# Patient Record
Sex: FEMALE | Race: WHITE | NOT HISPANIC OR LATINO | Employment: UNEMPLOYED | URBAN - METROPOLITAN AREA
[De-identification: names, ages, dates, MRNs, and addresses within clinical notes are randomized per-mention and may not be internally consistent; named-entity substitution may affect disease eponyms.]

---

## 2017-01-01 ENCOUNTER — ALLIED HEALTH/NURSE VISIT (OUTPATIENT)
Dept: PHYSICAL MEDICINE AND REHAB | Facility: CLINIC | Age: 67
End: 2017-01-01
Payer: MEDICARE

## 2017-01-01 DIAGNOSIS — Z97.8 PRESENCE OF INTRATHECAL BACLOFEN PUMP: ICD-10-CM

## 2017-01-01 DIAGNOSIS — G35 MS (MULTIPLE SCLEROSIS) (H): Primary | ICD-10-CM

## 2017-01-01 DIAGNOSIS — M62.838 SPASM OF MUSCLE: ICD-10-CM

## 2017-01-02 ENCOUNTER — ALLIED HEALTH/NURSE VISIT (OUTPATIENT)
Dept: PHYSICAL MEDICINE AND REHAB | Facility: CLINIC | Age: 67
End: 2017-01-02

## 2017-01-02 DIAGNOSIS — M62.838 SPASM OF MUSCLE: Primary | ICD-10-CM

## 2017-01-02 DIAGNOSIS — Z95.828 PRESENCE OF IMPLANTED INFUSION PUMP: ICD-10-CM

## 2017-01-02 DIAGNOSIS — G35 MULTIPLE SCLEROSIS (H): ICD-10-CM

## 2017-01-02 NOTE — PROGRESS NOTES
Intrathecal Pump Clinic Note    Aga Fraga is being seen in the Intrathecal Baclofen Pump Clinic for a pump refill for Spasticity secondary to MS.      VERIFICATION OF PATIENT IDENTIFICATION AND PROCEDURE     Initials   Patient Name lmd   Patient  lmd   Procedure Verified by: lmd     Prior to the start of the procedure and with procedural staff participation, I verbally confirmed the patient s identity using two indicators, relevant allergies, that the procedure was appropriate and matched the consent or emergent situation, and that the correct equipment/implants were available. Immediately prior to starting the procedure I conducted the Time Out with the procedural staff and re-confirmed the patient s name, procedure, and site/side. (The Joint Commission universal protocol was followed.)  Yes      INTERIM HISTORY:  Aga has been doing well since our last visit.  Has had pneumonia treated with abx.  Continues to reside at [facility].  Is debating at this time to sell her condo.    INTERIM PAST MEDICAL HISTORY:  There have been other physician visits since our last appointment.  She has been seen by Urology for kidney stones.  Has had pneumonia treated with abx.   She IS NOT currently involved in therapy.  She is also working on ADL's for a home program.  She has made functional gains in .    REVIEW OF SYSTEMS:  She has pain managed with Baclofen Pump and oral meds  She has difficulty with bladder issues. Kidney stones  She denies having issues with her bowels.  Patient denies side effects from the intrathecal Baclofen.    Current Outpatient Prescriptions   Medication Sig Dispense Refill     cefTRIAXone (CEFTRIAXONE SODIUM) 1 GM vial Inject 1 g (1,000 mg) into the vein daily 20 mL 0     amoxicillin-clavulanate (AUGMENTIN) 875-125 MG per tablet Take 1 tablet by mouth 2 times daily 60 tablet 0     oxyCODONE (ROXICODONE) 5 MG IR tablet Take 1 tablet (5 mg) by mouth every 4 hours as  needed for pain 30 tablet 0     cholecalciferol (VITAMIN D3) 1000 UNIT tablet Take 1,000 Units by mouth daily       Glatiramer Acetate (COPAXONE) 40 MG/ML SOSY Inject 1 mL Subcutaneous three times a week Every Tuesday, Thursday and Saturday       fluticasone (FLONASE) 50 MCG/ACT spray Spray 1 spray into both nostrils daily as needed for rhinitis or allergies       nystatin-triamcinolone (MYCOLOG II) cream Apply topically 2 times daily as needed       Omega-3 Fatty Acids (OMEGA-3 PO) Take 300-1,000 mg by mouth daily       Pollen Extracts (PROSTAT PO) Take by mouth 2 times daily 15gm/1oz       collagenase ointment Apply topically as needed (Wound care for pressure ulcers)       acetaminophen (TYLENOL) 325 MG tablet Take 650 mg by mouth every 4 hours as needed for mild pain       OMEPRAZOLE PO Take 20 mg by mouth 2 times daily (before meals)        ferrous sulfate (IRON) 325 (65 FE) MG tablet Take 325 mg by mouth daily (with breakfast)       traMADol (ULTRAM) 50 MG tablet Take 1 tablet (50 mg) by mouth every 6 hours as needed 28 tablet      gabapentin (NEURONTIN) 100 MG capsule Take 1-2 capsules (100-200 mg) by mouth 3 times daily Take 100 mg (1 capsule) by mouth in the morning, 100 mg at noon, 100 mg in the afternoon, and 200 mg (2 capsules) at bedtimeSee Admin Instructions Take 100 mg (1 capsule) by mouth in the morning, 100 mg at noon, 100 mg in the afternoon, and 200 mg (2 capsules) at bedtime 90 capsule 0     PARoxetine (PAXIL-CR) 25 MG 24 hr tablet Take 1 tablet (25 mg) by mouth every morning 30 tablet      atenolol (TENORMIN) 25 MG tablet Take 1 tablet (25 mg) by mouth 2 times daily 30 tablet      calcium carbonate (OS-EVELINE 500 MG [place]. [place]) 500 MG tablet Take 1 tablet (500 mg) by mouth daily 90 tablet      multivitamin, therapeutic with minerals (THERA-VIT-M) TABS Take 1 tablet by mouth daily 30 each 0     baclofen (LIORESAL) 20 MG tablet Take 0.5 tablets (10 mg) by mouth 4 times daily as needed for muscle spasms  (Patient taking differently: Take 10 mg by mouth 2 times daily Taken Every AM and PM. Also given up to three times throughout the day PRN) 120 tablet 3     fosfomycin (MONUROL) 3 G packet Take 1 packet (3 g) by mouth every 7 days 1 packet 0     methyl salicylate-menthol (BENGAY) OINT Apply 2 g topically every 6 hours as needed (pain). 1 Tube 2       INTERIM SOCIAL HISTORY:  Social situation has not changed since the last visit.  She is unemployed.  She is living at [facility]..    PHYSICAL EXAMINATION:  Interrogation of the Baclofen  pump shows that it is currently running at a Simple Continuous dose of 27.0 mcg/day. She also has a Flex (complex continuous) dose of 35.0 mcg/hour over 5 hours; 35.4 mcg/hour over 5 hours.  Pump site is intact with no signs of infection, redness, swelling or seroma.    Clonus is not present.    Musculoskeletal contractures are present in the knees and ankles. She has bilateral knee flexion contractures with bilateral plantar flexion contractures, and her right ankle has an inversion flexion contracture.    Gait examination showed non-ambulatory.    Upper extremity motor control:  Unchanged from previous exam.    Lower extremity motor control/gait:  Unchanged from previous exam.    Oral motor control/speech:  Is optimized    Pain:  Is under adequate control.    Psychosocial:  Status is unchanged from the previous visit..    IMPRESSION/PLAN:  Visit Reason: Pump Refill  Refill Amount: 20 mL  NDC #: 2000 (26159-556-62)  Sterile Prep & Drape: Yes  Mililiters Withdrawn: 3.0  Mililiters Expected: 2.9  Previous Dose: 27.0 mcg/day continuous dose, with a flex dose of 35.0 mcg/hour over 5 hours, and 35.4 mcg/hour over 5 hours.  New Dose: 27.0 mcg/day, with flex dosing same as above  Alarm Date: 2/20/2017  Replacement Interval: 48 months  Spasticity: Optimal  Therapeutic Level: Optimal  Next appointment: 2/15/2017      Niru Yun RN  Under the supervision of  Gigi  MD Serjio  Department of Physical Medicine and Rehabilitation

## 2017-01-05 ENCOUNTER — TRANSFERRED RECORDS (OUTPATIENT)
Dept: HEALTH INFORMATION MANAGEMENT | Facility: CLINIC | Age: 67
End: 2017-01-05

## 2017-01-05 ENCOUNTER — CARE COORDINATION (OUTPATIENT)
Dept: UROLOGY | Facility: CLINIC | Age: 67
End: 2017-01-05

## 2017-01-05 DIAGNOSIS — N39.0 URINARY TRACT INFECTION ASSOCIATED WITH CATHETERIZATION OF URINARY TRACT, UNSPECIFIED INDWELLING URINARY CATHETER TYPE, INITIAL ENCOUNTER (H): Primary | ICD-10-CM

## 2017-01-05 DIAGNOSIS — T83.511A URINARY TRACT INFECTION ASSOCIATED WITH CATHETERIZATION OF URINARY TRACT, UNSPECIFIED INDWELLING URINARY CATHETER TYPE, INITIAL ENCOUNTER (H): Primary | ICD-10-CM

## 2017-01-05 NOTE — PROGRESS NOTES
Spoke with Parminder at [facility]. Aga continues to take Augmentin and will through the surgery next week. He will get a urine culture on her today per Dr. Hyde request. Nolvia Martin RN

## 2017-01-09 ENCOUNTER — CARE COORDINATION (OUTPATIENT)
Dept: UROLOGY | Facility: CLINIC | Age: 67
End: 2017-01-09

## 2017-01-09 ENCOUNTER — COMMUNICATION - HEALTHEAST (OUTPATIENT)
Dept: FAMILY MEDICINE | Facility: CLINIC | Age: 67
End: 2017-01-09

## 2017-01-09 ENCOUNTER — DOCUMENTATION ONLY (OUTPATIENT)
Dept: UROLOGY | Facility: CLINIC | Age: 67
End: 2017-01-09

## 2017-01-11 ENCOUNTER — ANESTHESIA EVENT (OUTPATIENT)
Dept: SURGERY | Facility: CLINIC | Age: 67
End: 2017-01-11
Payer: MEDICARE

## 2017-01-12 ENCOUNTER — ANESTHESIA (OUTPATIENT)
Dept: SURGERY | Facility: CLINIC | Age: 67
End: 2017-01-12
Payer: MEDICARE

## 2017-01-12 ENCOUNTER — APPOINTMENT (OUTPATIENT)
Dept: GENERAL RADIOLOGY | Facility: CLINIC | Age: 67
End: 2017-01-12
Attending: UROLOGY
Payer: MEDICARE

## 2017-01-12 PROCEDURE — 25000125 ZZHC RX 250: Performed by: ANESTHESIOLOGY

## 2017-01-12 PROCEDURE — 25000125 ZZHC RX 250: Performed by: NURSE ANESTHETIST, CERTIFIED REGISTERED

## 2017-01-12 PROCEDURE — 25800025 ZZH RX 258: Performed by: NURSE ANESTHETIST, CERTIFIED REGISTERED

## 2017-01-12 PROCEDURE — 25000125 ZZHC RX 250: Performed by: UROLOGY

## 2017-01-12 PROCEDURE — 25000128 H RX IP 250 OP 636: Performed by: ANESTHESIOLOGY

## 2017-01-12 PROCEDURE — 40000278 XR SURGERY CARM FLUORO LESS THAN 5 MIN: Mod: TC

## 2017-01-12 RX ORDER — LIDOCAINE HYDROCHLORIDE 20 MG/ML
INJECTION, SOLUTION INFILTRATION; PERINEURAL PRN
Status: DISCONTINUED | OUTPATIENT
Start: 2017-01-12 | End: 2017-01-12

## 2017-01-12 RX ORDER — ONDANSETRON 2 MG/ML
INJECTION INTRAMUSCULAR; INTRAVENOUS PRN
Status: DISCONTINUED | OUTPATIENT
Start: 2017-01-12 | End: 2017-01-12

## 2017-01-12 RX ORDER — SODIUM CHLORIDE, SODIUM LACTATE, POTASSIUM CHLORIDE, CALCIUM CHLORIDE 600; 310; 30; 20 MG/100ML; MG/100ML; MG/100ML; MG/100ML
INJECTION, SOLUTION INTRAVENOUS CONTINUOUS PRN
Status: DISCONTINUED | OUTPATIENT
Start: 2017-01-12 | End: 2017-01-12

## 2017-01-12 RX ORDER — ETOMIDATE 2 MG/ML
INJECTION INTRAVENOUS PRN
Status: DISCONTINUED | OUTPATIENT
Start: 2017-01-12 | End: 2017-01-12

## 2017-01-12 RX ORDER — NEOSTIGMINE METHYLSULFATE 1 MG/ML
VIAL (ML) INJECTION PRN
Status: DISCONTINUED | OUTPATIENT
Start: 2017-01-12 | End: 2017-01-12

## 2017-01-12 RX ORDER — FENTANYL CITRATE 50 UG/ML
INJECTION, SOLUTION INTRAMUSCULAR; INTRAVENOUS PRN
Status: DISCONTINUED | OUTPATIENT
Start: 2017-01-12 | End: 2017-01-12

## 2017-01-12 RX ORDER — GLYCOPYRROLATE 0.2 MG/ML
INJECTION, SOLUTION INTRAMUSCULAR; INTRAVENOUS PRN
Status: DISCONTINUED | OUTPATIENT
Start: 2017-01-12 | End: 2017-01-12

## 2017-01-12 RX ADMIN — FENTANYL CITRATE 100 MCG: 50 INJECTION, SOLUTION INTRAMUSCULAR; INTRAVENOUS at 12:58

## 2017-01-12 RX ADMIN — NEOSTIGMINE METHYLSULFATE 3 MG: 1 INJECTION INTRAMUSCULAR; INTRAVENOUS; SUBCUTANEOUS at 14:52

## 2017-01-12 RX ADMIN — PHENYLEPHRINE HYDROCHLORIDE 100 MCG: 10 INJECTION, SOLUTION INTRAMUSCULAR; INTRAVENOUS; SUBCUTANEOUS at 13:34

## 2017-01-12 RX ADMIN — GLYCOPYRROLATE 0.4 MG: 0.2 INJECTION, SOLUTION INTRAMUSCULAR; INTRAVENOUS at 14:52

## 2017-01-12 RX ADMIN — PHENYLEPHRINE HYDROCHLORIDE 200 MCG: 10 INJECTION, SOLUTION INTRAMUSCULAR; INTRAVENOUS; SUBCUTANEOUS at 13:05

## 2017-01-12 RX ADMIN — PHENYLEPHRINE HYDROCHLORIDE 0.3 MCG/KG/MIN: 10 INJECTION, SOLUTION INTRAMUSCULAR; INTRAVENOUS; SUBCUTANEOUS at 13:43

## 2017-01-12 RX ADMIN — SODIUM CHLORIDE, POTASSIUM CHLORIDE, SODIUM LACTATE AND CALCIUM CHLORIDE: 600; 310; 30; 20 INJECTION, SOLUTION INTRAVENOUS at 12:31

## 2017-01-12 RX ADMIN — VANCOMYCIN HYDROCHLORIDE 1 G: 1 INJECTION, SOLUTION INTRAVENOUS at 14:18

## 2017-01-12 RX ADMIN — LIDOCAINE HYDROCHLORIDE 60 MG: 20 INJECTION, SOLUTION INFILTRATION; PERINEURAL at 12:58

## 2017-01-12 RX ADMIN — ONDANSETRON 4 MG: 2 INJECTION INTRAMUSCULAR; INTRAVENOUS at 14:38

## 2017-01-12 RX ADMIN — PIPERACILLIN SODIUM AND TAZOBACTAM SODIUM 3.38 G: .375; 3 INJECTION, POWDER, LYOPHILIZED, FOR SOLUTION INTRAVENOUS at 13:45

## 2017-01-12 RX ADMIN — PHENYLEPHRINE HYDROCHLORIDE 100 MCG: 10 INJECTION, SOLUTION INTRAMUSCULAR; INTRAVENOUS; SUBCUTANEOUS at 13:15

## 2017-01-12 RX ADMIN — MIDAZOLAM HYDROCHLORIDE 1 MG: 1 INJECTION, SOLUTION INTRAMUSCULAR; INTRAVENOUS at 12:58

## 2017-01-12 RX ADMIN — ROCURONIUM BROMIDE 20 MG: 10 INJECTION INTRAVENOUS at 12:58

## 2017-01-12 RX ADMIN — ETOMIDATE 16 MG: 2 INJECTION INTRAVENOUS at 12:58

## 2017-01-12 ASSESSMENT — LIFESTYLE VARIABLES: TOBACCO_USE: 0

## 2017-01-12 NOTE — ANESTHESIA PREPROCEDURE EVALUATION
Anesthesia Evaluation     .        ROS/MED HX    ENT/Pulmonary: Comment: MS with respiratory compromise - wears 2L Oxygen at night. O2 saturations 80% upon arrival today. Bumped up to mid 90s with 2 L oxygen.      (-) tobacco use, asthma and recent URI   Neurologic:     (+)Multiple Sclerosis limitations: wheelchair bound, baclofen pump,     Cardiovascular: Comment: Hypertrophic cardiomyopathy    TTE 2013  Poor image quality  Technically difficult study   1.  Hypertrophic   cardiomyopathy with focal basal septal hypertrophy. Small LV cavity. No wall   motion abnormalities. Normal LV systolic function with estimated ejection   fraction 70%.  Severe dynamic LVOT obstruction at rest, peak gradient 58   mmHg. 2.  Grossly normal RV size and function  3. Systolic anterior motion of   mitral valve leaflets  4. Small IVC, suggestive of hypovolemia.  PatientHeight: 64 in  PatientWeight: 167 lbs  SystolicPressure: 127 mmHg  DiastolicPressure: 63 mmHg  HeartRate: 73 bpm  BSA 1.8 m^2      (+) ----. : . CHF . . :. .       METS/Exercise Tolerance:     Hematologic:     (+) Anemia, -      Musculoskeletal:         GI/Hepatic:     (+) GERD       Renal/Genitourinary:     (+) chronic renal disease (nephrolithiasis),       Endo:         Psychiatric:  - neg psychiatric ROS   (+) psychiatric history       Infectious Disease:         Malignancy:         Other:               Physical Exam  Normal systems: dental    Airway   Mallampati: III  TM distance: >3 FB  Neck ROM: full  Comment: Torticollis    Dental     Cardiovascular       Pulmonary                     Anesthesia Plan      History & Physical Review  History and physical reviewed and following examination; no interval change.    ASA Status:  3 .    NPO Status:  > 8 hours    Plan for General with Intravenous induction. Maintenance will be Inhalation.    PONV prophylaxis:  Ondansetron (or other 5HT-3) and Dexamethasone or Solumedrol  Additional equipment: Videolaryngoscope, 2nd IV and  Arterial Line Discussed risks, benefits, and alternatives of general anesthesia with the patient. Risks discussed included nausea/vomiting, sore throat, dental damage, soft tissue damage, problems with heart, brain, lungs, and rare allergic reactions. Patient was given a chance to ask questions. Patient consents to procedure.     Plan for arterial line, IV induction, inhalational maintenance.     We had a discussion that patient may not be extubated at the end of case. She understands that she may remain intubated and require transfer to the ICU at the end of the case. She voices understanding.       Postoperative Care  Postoperative pain management:  IV analgesics.      Consents  Anesthetic plan, risks, benefits and alternatives discussed with:  Patient.  Use of blood products discussed: Yes.   Use of blood products discussed with Patient.  Consented to blood products.  .                          .

## 2017-01-12 NOTE — ANESTHESIA POSTPROCEDURE EVALUATION
Patient: Aga Fraga    PERCUTANEOUS NEPHROLITHOTOMY (Right Flank)  Additional InformationProcedure(s):  Right Percutaneous Nephrolithotomy - Wound Class: I-Clean    Diagnosis:Right Straghorn Renal Calculus, Right Calculus Of Kidney   Diagnosis Additional Information: No value filed.    Anesthesia Type:  General    Note:  Anesthesia Post Evaluation    Patient location during evaluation: PACU  Patient participation: Able to fully participate in evaluation  Level of consciousness: awake and alert  Pain management: adequate  Airway patency: patent  Cardiovascular status: acceptable  Respiratory status: acceptable  Hydration status: acceptable  PONV: none     Anesthetic complications: None          Last vitals:  Filed Vitals:    01/12/17 1016 01/12/17 1515 01/12/17 1530   BP:  144/93 149/71   Temp:  36.6  C (97.9  F)    Resp:  12 12   SpO2: 93% 97% 97%       Electronically Signed By: Dao Kumar MD, MD  January 12, 2017  3:42 PM

## 2017-01-12 NOTE — ANESTHESIA CARE TRANSFER NOTE
Patient: Aga Fraga    PERCUTANEOUS NEPHROLITHOTOMY (Right Flank)  Additional InformationProcedure(s):  Right Percutaneous Nephrolithotomy - Wound Class: I-Clean    Diagnosis: Right Straghorn Renal Calculus, Right Calculus Of Kidney   Diagnosis Additional Information: No value filed.    Anesthesia Type:   General     Note:  Airway :Face Mask  Patient transferred to:PACU  Comments: VSS.  Spontaneous respirations.  Neuro at baseline.  No pain per patient report.  Satisfactory anesthetic recovery.      Vitals: (Last set prior to Anesthesia Care Transfer)              Electronically Signed By: KATALINA Zaman CRNA  January 12, 2017  3:28 PM

## 2017-01-12 NOTE — ANESTHESIA PROCEDURE NOTES
Arterial Line Procedure Note  Staff:     Anesthesiologist:  LUIS EDUARDO CHUNG    Resident/CRNA:  ERI NEGRON    Arterial line performed by resident/CRNA in presence of a teaching physician    Location: In OR After Induction  Procedure Start/Stop Times:     patient identified, IV checked, site marked, risks and benefits discussed, informed consent, monitors and equipment checked, pre-op evaluation and at physician/surgeon's request      Correct Patient: Yes      Correct Position: Yes      Correct Site: Yes      Correct Procedure: Yes      Correct Laterality:  Yes    Site Marked:  Yes  Line Placement:     Procedure:  Arterial Line    Insertion Site:  Radial    Insertion laterality:  Left    Skin Prep: Chloraprep      Patient Prep: patient draped, mask, sterile gloves, hat and hand hygiene      Local skin infiltration:  None    Ultrasound Guided?: Yes      Artery evaluated via ultrasound confirming patency.   Using realtime imaging, the artery was punctured and the needle was observed entering the artery.      Catheter size:  20 gauge, 12 cm    Cath secured with: suture      Dressing:  Tegaderm    Complications:  None obvious    Arterial waveform: Yes      IBP within 10% of NIBP: Yes

## 2017-01-13 ENCOUNTER — APPOINTMENT (OUTPATIENT)
Dept: CT IMAGING | Facility: CLINIC | Age: 67
End: 2017-01-13
Attending: UROLOGY
Payer: MEDICARE

## 2017-01-13 PROCEDURE — 74176 CT ABD & PELVIS W/O CONTRAST: CPT

## 2017-01-17 ENCOUNTER — ALLIED HEALTH/NURSE VISIT (OUTPATIENT)
Dept: UROLOGY | Facility: CLINIC | Age: 67
End: 2017-01-17

## 2017-01-17 ENCOUNTER — AMBULATORY - HEALTHEAST (OUTPATIENT)
Dept: GERIATRICS | Facility: CLINIC | Age: 67
End: 2017-01-17

## 2017-01-17 DIAGNOSIS — N20.0 KIDNEY STONES: Primary | ICD-10-CM

## 2017-01-17 DIAGNOSIS — N20.0 CALCULUS OF KIDNEY: Primary | ICD-10-CM

## 2017-01-17 NOTE — MR AVS SNAPSHOT
After Visit Summary   1/17/2017    Aga Fraga    MRN: [id number]           Patient Information     Date Of Birth          1950        Visit Information        Provider Department      1/17/2017 9:30 AM Nurse, [facility] U[facility] and [facility]        Today's Diagnoses     Kidney stones    -  1        Follow-ups after your visit        Your next 10 appointments already scheduled     Feb 15, 2017  2:00 PM   (Arrive by 1:45 PM)   Return Visit with  Pmr Nurse   [facility] Physical Medicine and Rehabilitation ([facility])    [address] [place] [address]  3rd [facility] [id number] [phone]            Feb 21, 2017  8:20 AM   CT ABDOMEN PELVIS W/O CONTRAST with UCCT2   [facility] [place] [facility] ([facility])    [address] [place] [address]  1st [facility] [id number] [phone]           Please bring any scans or X-rays taken at other hospitals, if similar tests were done. Also bring a list of your medicines, including vitamins, minerals and over-the-counter drugs. It is safest to leave personal items at home.  Be sure to tell your doctor:   If you have any allergies.   If there s any chance you are pregnant.   If you are breastfeeding.   If you have any special needs.  You do not need to do anything special to prepare.  Please wear loose clothing, such as a sweat suit or jogging clothes. Avoid snaps, zippers and other metal. We may ask you to undress and put on a hospital gown.            Feb 21, 2017 10:00 AM   (Arrive by 9:45 AM)   Return Visit with Darius Hyde MD   [facility] Urology and [facility]rolog[facility] ([facility])    [address] [place] [address]  4th [facility] [id number] [phone]              Future tests that were ordered for you today     Open Future Orders        Priority Expected Expires Ordered    CT Abdomen  pelvis w/o contrast Routine 2017            Who to contact     Please call your clinic at [phone] to:    Ask questions about your health    Make or cancel appointments    Discuss your medicines    Learn about your test results    Speak to your doctor   If you have compliments or concerns about an experience at your clinic, or if you wish to file a complaint, please contact [facility] Patient Relations at [phone] or email us at [email or web address]         Additional Information About Your Visit        Visualase Information     Visualase is an electronic gateway that provides easy, online access to your medical records. With Visualase, you can request a clinic appointment, read your test results, renew a prescription or communicate with your care team.     To sign up for Visualase visit the website at [email or web address]   You will be asked to enter the access code listed below, as well as some personal information. Please follow the directions to create your username and password.     Your access code is: [email or web address]  Expires: 2017  9:00 AM     Your access code will  in 90 days. If you need help or a new code, please contact your [facility] or call [phone] for assistance.        Care EveryWhere ID     This is your Care EveryWhere ID. This could be used by other organizations to access your [place] medical records  [id number]         Blood Pressure from Last 3 Encounters:   17 121/50   16 162/78   16 110/47    Weight from Last 3 Encounters:   17 63.277 kg (139 lb 8 oz)   16 71.6 kg (157 lb 13.6 oz)   16 71.668 kg (158 lb)              Today, you had the following     No orders found for display         Today's Medication Changes          These changes are accurate as of: 17  9:52 AM.  If you have any questions, ask your nurse or doctor.                These medicines have changed or have updated prescriptions.        Dose/Directions    baclofen 20 MG tablet   Commonly known as:  LIORESAL   This may have changed:    - when to take this  - additional instructions   Used for:  Multiple sclerosis (H), Muscle spasm        Dose:  10 mg   Take 0.5 tablets (10 mg) by mouth 4 times daily as needed for muscle spasms   Quantity:  120 tablet   Refills:  3                Primary Care Provider Office Phone # Fax #    Astrid Marroquin [phone]       [facility] AFTER HOURS [address] [place] [address]        Thank you!     Thank you for choosing [facility] UROLOGY AND [facility] FOR PROSTATE AND UROLOGIC CANCERS  for your care. Our goal is always to provide you with excellent care. Hearing back from our patients is one way we can continue to improve our services. Please take a few minutes to complete the written survey that you may receive in the mail after your visit with us. Thank you!             Your Updated Medication List - Protect others around you: Learn how to safely use, store and throw away your medicines at www.disposemymeds.org.          This list is accurate as of: 1/17/17  9:52 AM.  Always use your most recent med list.                   Brand Name Dispense Instructions for use    amoxicillin-clavulanate 875-125 MG per tablet    AUGMENTIN    60 tablet    Take 1 tablet by mouth 2 times daily       atenolol 25 MG tablet    TENORMIN    30 tablet    Take 1 tablet (25 mg) by mouth 2 times daily       baclofen 20 MG tablet    LIORESAL    120 tablet    Take 0.5 tablets (10 mg) by mouth 4 times daily as needed for muscle spasms       calcium carbonate 500 MG tablet    OS-EVELINE 500 mg [place]. [place]    90 tablet    Take 1 tablet (500 mg) by mouth daily       cholecalciferol 1000 UNIT tablet    vitamin D     Take 1,000 Units by mouth daily       collagenase ointment      Apply topically as needed (Wound care for pressure ulcers)       COPAXONE 40 MG/ML  Sosy   Generic drug:  Glatiramer Acetate      Inject 1 mL Subcutaneous three times a week Every Tuesday, Thursday and Saturday       ferrous sulfate 325 (65 FE) MG tablet    IRON     Take 325 mg by mouth daily (with breakfast)       fluticasone 50 MCG/ACT spray    FLONASE     Spray 1 spray into both nostrils daily as needed for rhinitis or allergies       gabapentin 100 MG capsule    NEURONTIN    90 capsule    Take 1-2 capsules (100-200 mg) by mouth 3 times daily Take 100 mg (1 capsule) by mouth in the morning, 100 mg at noon, 100 mg in the afternoon, and 200 mg (2 capsules) at bedtimeSee Admin Instructions Take 100 mg (1 capsule) by mouth in the morning, 100 mg at noon, 100 mg in the afternoon, and 200 mg (2 capsules) at bedtime       methyl salicylate-menthol Oint     1 Tube    Apply 2 g topically every 6 hours as needed (pain).       multivitamin, therapeutic with minerals Tabs tablet     30 each    Take 1 tablet by mouth daily       nystatin-triamcinolone cream    MYCOLOG II     Apply topically 2 times daily as needed       OMEGA-3 PO      Take 300-1,000 mg by mouth daily       OMEPRAZOLE PO      Take 20 mg by mouth 2 times daily (before meals)       oxyCODONE 5 MG IR tablet    ROXICODONE    30 tablet    Take 1 tablet (5 mg) by mouth every 4 hours as needed for moderate to severe pain       PARoxetine 25 MG 24 hr tablet    PAXIL-CR    30 tablet    Take 1 tablet (25 mg) by mouth every morning       PROSTAT PO      Take by mouth 2 times daily 15gm/1oz       traMADol 50 MG tablet    ULTRAM    28 tablet    Take 1 tablet (50 mg) by mouth every 6 hours as needed       TYLENOL 325 MG tablet   Generic drug:  acetaminophen      Take 650 mg by mouth every 4 hours as needed for mild pain

## 2017-01-17 NOTE — PROGRESS NOTES
Patient presented to clinic to have right PNT tube removed.  Dr. Hyde visited with the patient, removed the right PNT, placed an ostomy bag over insertion site of PNT for any additional drainage.  Directed patient that once the drainage has ceased that bag can be removed and band-aid can be placed over opening.  Patient was scheduled for CT stone protocol and follow up with Dr. Hyde in 4-6 weeks.    Cathie Ramos LPN

## 2017-01-23 ENCOUNTER — OFFICE VISIT - HEALTHEAST (OUTPATIENT)
Dept: GERIATRICS | Facility: CLINIC | Age: 67
End: 2017-01-23

## 2017-01-23 DIAGNOSIS — G35 MULTIPLE SCLEROSIS (H): ICD-10-CM

## 2017-01-23 DIAGNOSIS — N31.9 NEUROGENIC BLADDER: ICD-10-CM

## 2017-01-23 DIAGNOSIS — I10 ESSENTIAL HYPERTENSION: ICD-10-CM

## 2017-01-23 DIAGNOSIS — N20.0 RENAL CALCULI: ICD-10-CM

## 2017-01-23 DIAGNOSIS — I42.2 HYPERTROPHIC CARDIOMYOPATHY (H): ICD-10-CM

## 2017-01-31 ENCOUNTER — OFFICE VISIT - HEALTHEAST (OUTPATIENT)
Dept: VASCULAR SURGERY | Facility: CLINIC | Age: 67
End: 2017-01-31

## 2017-01-31 DIAGNOSIS — E88.09 HYPOALBUMINEMIA: ICD-10-CM

## 2017-01-31 DIAGNOSIS — G35 MULTIPLE SCLEROSIS (H): ICD-10-CM

## 2017-01-31 DIAGNOSIS — N31.9 NEUROGENIC BLADDER: ICD-10-CM

## 2017-01-31 DIAGNOSIS — L89.210: ICD-10-CM

## 2017-01-31 DIAGNOSIS — Z99.3 WHEELCHAIR BOUND: ICD-10-CM

## 2017-01-31 ASSESSMENT — MIFFLIN-ST. JEOR: SCORE: 1241.64

## 2017-02-04 ENCOUNTER — MEDICAL CORRESPONDENCE (OUTPATIENT)
Dept: HEALTH INFORMATION MANAGEMENT | Facility: CLINIC | Age: 67
End: 2017-02-04

## 2017-02-13 ENCOUNTER — PRE VISIT (OUTPATIENT)
Dept: UROLOGY | Facility: CLINIC | Age: 67
End: 2017-02-13

## 2017-02-13 NOTE — TELEPHONE ENCOUNTER
Patient coming in for 1 month check for kidney stones. Patient has Ct Scan prior to visit. Records/order in [employer]. No need to call patient

## 2017-02-15 ENCOUNTER — ALLIED HEALTH/NURSE VISIT (OUTPATIENT)
Dept: PHYSICAL MEDICINE AND REHAB | Facility: CLINIC | Age: 67
End: 2017-02-15

## 2017-02-15 DIAGNOSIS — G35 MULTIPLE SCLEROSIS (H): ICD-10-CM

## 2017-02-15 DIAGNOSIS — M62.838 SPASM OF MUSCLE: Primary | ICD-10-CM

## 2017-02-15 DIAGNOSIS — Z95.828 PRESENCE OF IMPLANTED INFUSION PUMP: ICD-10-CM

## 2017-02-15 NOTE — PROGRESS NOTES
Intrathecal Pump Clinic Note    Aga Fraga is being seen in the Intrathecal Baclofen Pump Clinic for a pump refill for Spasticity secondary to MS.      VERIFICATION OF PATIENT IDENTIFICATION AND PROCEDURE     Initials   Patient Name lmd   Patient  lmd   Procedure Verified by: bonnie     Prior to the start of the procedure and with procedural staff participation, I verbally confirmed the patient s identity using two indicators, relevant allergies, that the procedure was appropriate and matched the consent or emergent situation, and that the correct equipment/implants were available. Immediately prior to starting the procedure I conducted the Time Out with the procedural staff and re-confirmed the patient s name, procedure, and site/side. (The Joint Commission universal protocol was followed.)  Yes    INTERIM HISTORY:  Aga has not been doing well since our last visit.  She complains of chronic issues with renal stones.    INTERIM PAST MEDICAL HISTORY:  There have been other physician visits since our last appointment.  She has been seen by Urology for nephrostomy tube placement. Tube was subsequently removed.  She IS NOT currently involved in therapy.  She is also working on ADL's for a home program.  Currently resides at [facility].    She has made functional gains in .    REVIEW OF SYSTEMS:  She has pain managed with Baclofen Pump and oral meds.  She has difficulty with bladder issues. See above  She denies having issues with her bowels.  Patient denies side effects from the intrathecal Baclofen.    Current Outpatient Prescriptions   Medication Sig Dispense Refill     oxyCODONE (ROXICODONE) 5 MG IR tablet Take 1 tablet (5 mg) by mouth every 4 hours as needed for moderate to severe pain 30 tablet 0     cholecalciferol (VITAMIN D3) 1000 UNIT tablet Take 1,000 Units by mouth daily       Glatiramer Acetate (COPAXONE) 40 MG/ML SOSY Inject 1 mL Subcutaneous three times a week Every Tuesday,  Thursday and Saturday       fluticasone (FLONASE) 50 MCG/ACT spray Spray 1 spray into both nostrils daily as needed for rhinitis or allergies       nystatin-triamcinolone (MYCOLOG II) cream Apply topically 2 times daily as needed       Omega-3 Fatty Acids (OMEGA-3 PO) Take 300-1,000 mg by mouth daily       Pollen Extracts (PROSTAT PO) Take by mouth 2 times daily 15gm/1oz       collagenase ointment Apply topically as needed (Wound care for pressure ulcers)       acetaminophen (TYLENOL) 325 MG tablet Take 650 mg by mouth every 4 hours as needed for mild pain       OMEPRAZOLE PO Take 20 mg by mouth 2 times daily (before meals)        ferrous sulfate (IRON) 325 (65 FE) MG tablet Take 325 mg by mouth daily (with breakfast)       traMADol (ULTRAM) 50 MG tablet Take 1 tablet (50 mg) by mouth every 6 hours as needed 28 tablet      gabapentin (NEURONTIN) 100 MG capsule Take 1-2 capsules (100-200 mg) by mouth 3 times daily Take 100 mg (1 capsule) by mouth in the morning, 100 mg at noon, 100 mg in the afternoon, and 200 mg (2 capsules) at bedtimeSee Admin Instructions Take 100 mg (1 capsule) by mouth in the morning, 100 mg at noon, 100 mg in the afternoon, and 200 mg (2 capsules) at bedtime 90 capsule 0     PARoxetine (PAXIL-CR) 25 MG 24 hr tablet Take 1 tablet (25 mg) by mouth every morning 30 tablet      atenolol (TENORMIN) 25 MG tablet Take 1 tablet (25 mg) by mouth 2 times daily 30 tablet      calcium carbonate (OS-EVELINE 500 MG [place]. [place]) 500 MG tablet Take 1 tablet (500 mg) by mouth daily 90 tablet      multivitamin, therapeutic with minerals (THERA-VIT-M) TABS Take 1 tablet by mouth daily 30 each 0     baclofen (LIORESAL) 20 MG tablet Take 0.5 tablets (10 mg) by mouth 4 times daily as needed for muscle spasms (Patient taking differently: Take 10 mg by mouth 2 times daily Taken Every AM and PM. Also given up to three times throughout the day PRN) 120 tablet 3     methyl salicylate-menthol (BENGAY) OINT Apply 2 g topically  every 6 hours as needed (pain). 1 Tube 2       INTERIM SOCIAL HISTORY:  Social situation has not changed since the last visit.  She is unemployed.  She is living at [facility].    PHYSICAL EXAMINATION:  Interrogation of the Baclofen  pump shows that it is currently running at a Simple Continuous dose of 27.0 mcg/day. She also has a Flex (complex continuous) dose of 35.0 mcg/hour over 5 hours; 35.4 mcg/hour over 5 hours for total dose of 730.4 mcg/day.  Pump site is intact with no signs of infection, redness, swelling or seroma.     Clonus is not present.     Musculoskeletal contractures are present in the knees and ankles. She has bilateral knee flexion contractures with bilateral plantar flexion contractures, and her right ankle has an inversion flexion contracture.     Gait examination showed non-ambulatory.     Upper extremity motor control:  Unchanged from previous exam.     Lower extremity motor control/gait:  Unchanged from previous exam.     Oral motor control/speech:  Is optimized     Pain:  Is under adequate control.     Psychosocial:  Status is unchanged from the previous visit.    IMPRESSION/PLAN:  Visit Reason: Pump Refill  Refill Amount: 20 mL  NDC #: 2000 ([id number])  Sterile Prep & Drape: Yes  Mililiters Withdrawn: 3.8  Mililiters Expected: 3.9  New Dose: 27.0 mcg/day, with flex dosing same as above  Alarm Date: 4/5/2017  Replacement Interval: 47 months  Spasticity: Optimal  Therapeutic Level: Optimal    Next appointment: 3/29/2017      Niru Yun RN  Under the supervision of  Chirag Harris MD  Department of Physical Medicine and Rehabilitation

## 2017-02-15 NOTE — MR AVS SNAPSHOT
After Visit Summary   2/15/2017    Aga Fraga    MRN: [id number]           Patient Information     Date Of Birth          1950        Visit Information        Provider Department      2/15/2017 2:00 PM Nurse, Malcolm Ruby [facility] Physical Medicine and Rehabilitation        Today's Diagnoses     Spasm of muscle    -  1    Multiple sclerosis (H)        Presence of implanted infusion pump           Follow-ups after your visit        Your next 10 appointments already scheduled     Feb 21, 2017  8:20 AM CST   CT ABDOMEN PELVIS W/O CONTRAST with UCCT2   [facility] CT ([facility])    [address]  [address]st [facility] [id number] [phone]           Please bring any scans or X-rays taken at other hospitals, if similar tests were done. Also bring a list of your medicines, including vitamins, minerals and over-the-counter drugs. It is safest to leave personal items at home.  Be sure to tell your doctor:   If you have any allergies.   If there s any chance you are pregnant.   If you are breastfeeding.   If you have any special needs.  You do not need to do anything special to prepare.  Please wear loose clothing, such as a sweat suit or jogging clothes. Avoid snaps, zippers and other metal. We may ask you to undress and put on a hospital gown.            Feb 21, 2017 10:00 AM CST   (Arrive by 9:45 AM)   Return Visit with Darius Hyde MD   [facility]rology and Inst for Prostate and Urologic Cancers ([facility])    [address] [place] [address]  4th Floor  [facility] [id number] [phone]            Mar 29, 2017  1:00 PM CDT   (Arrive by 12:45 PM)   Return Visit with  Pmr Nurse   [facility] Physical Medicine and Rehabilitation ([facility])    [address]  3rd Floor  [facility] [id number] [phone]              Who to contact     Please call your clinic at [phone] to:    Ask  questions about your health    Make or cancel appointments    Discuss your medicines    Learn about your test results    Speak to your doctor   If you have compliments or concerns about an experience at your clinic, or if you wish to file a complaint, please contact [facility] Patient Relations at [phone] or email us at [email or web address]         Additional Information About Your Visit        ithinksporthart Information     [employer] is an electronic gateway that provides easy, online access to your medical records. With [employer], you can request a clinic appointment, read your test results, renew a prescription or communicate with your care team.     To sign up for [employer] visit the website at [email or web address]   You will be asked to enter the access code listed below, as well as some personal information. Please follow the directions to create your username and password.     Your access code is: [email or web address]  Expires: 2017  9:00 AM     Your access code will  in 90 days. If you need help or a new code, please contact your [facility] or call [phone] for assistance.        Care EveryWhere ID     This is your Care EveryWhere ID. This could be used by other organizations to access your [facility] medical records  [id number]         Blood Pressure from Last 3 Encounters:   17 121/50   16 162/78   16 110/47    Weight from Last 3 Encounters:   17 63.3 kg (139 lb 8 oz)   16 71.6 kg (157 lb 13.6 oz)   16 71.7 kg (158 lb)              Today, you had the following     No orders found for display         Today's Medication Changes          These changes are accurate as of: 2/15/17  4:43 PM.  If you have any questions, ask your nurse or doctor.               These medicines have changed or have updated prescriptions.        Dose/Directions    baclofen 20 MG tablet   Commonly known as:  LIORESAL   This  may have changed:    - when to take this  - additional instructions   Used for:  Multiple sclerosis (H), Muscle spasm        Dose:  10 mg   Take 0.5 tablets (10 mg) by mouth 4 times daily as needed for muscle spasms   Quantity:  120 tablet   Refills:  3                Primary Care Provider Office Phone # Fax #    Astrid Marroquin [phone]       [facility] AFTER HOURS [address] [place] [address]        Thank you!     Thank you for choosing [facility] PHYSICAL MEDICINE AND REHABILITATION  for your care. Our goal is always to provide you with excellent care. Hearing back from our patients is one way we can continue to improve our services. Please take a few minutes to complete the written survey that you may receive in the mail after your visit with us. Thank you!             Your Updated Medication List - Protect others around you: Learn how to safely use, store and throw away your medicines at www.disposemymeds.org.          This list is accurate as of: 2/15/17  4:43 PM.  Always use your most recent med list.                   Brand Name Dispense Instructions for use    atenolol 25 MG tablet    TENORMIN    30 tablet    Take 1 tablet (25 mg) by mouth 2 times daily       baclofen 20 MG tablet    LIORESAL    120 tablet    Take 0.5 tablets (10 mg) by mouth 4 times daily as needed for muscle spasms       calcium carbonate 500 MG tablet    OS-EVELINE 500 mg [place]. [place]    90 tablet    Take 1 tablet (500 mg) by mouth daily       cholecalciferol 1000 UNIT tablet    vitamin D     Take 1,000 Units by mouth daily       collagenase ointment      Apply topically as needed (Wound care for pressure ulcers)       COPAXONE 40 MG/ML Sosy   Generic drug:  Glatiramer Acetate      Inject 1 mL Subcutaneous three times a week Every Tuesday, Thursday and Saturday       ferrous sulfate 325 (65 FE) MG tablet    IRON     Take 325 mg by mouth daily (with breakfast)       fluticasone 50 MCG/ACT spray    FLONASE     Spray  1 spray into both nostrils daily as needed for rhinitis or allergies       gabapentin 100 MG capsule    NEURONTIN    90 capsule    Take 1-2 capsules (100-200 mg) by mouth 3 times daily Take 100 mg (1 capsule) by mouth in the morning, 100 mg at noon, 100 mg in the afternoon, and 200 mg (2 capsules) at bedtimeSee Admin Instructions Take 100 mg (1 capsule) by mouth in the morning, 100 mg at noon, 100 mg in the afternoon, and 200 mg (2 capsules) at bedtime       methyl salicylate-menthol Oint     1 Tube    Apply 2 g topically every 6 hours as needed (pain).       multivitamin, therapeutic with minerals Tabs tablet     30 each    Take 1 tablet by mouth daily       nystatin-triamcinolone cream    MYCOLOG II     Apply topically 2 times daily as needed       OMEGA-3 PO      Take 300-1,000 mg by mouth daily       OMEPRAZOLE PO      Take 20 mg by mouth 2 times daily (before meals)       oxyCODONE 5 MG IR tablet    ROXICODONE    30 tablet    Take 1 tablet (5 mg) by mouth every 4 hours as needed for moderate to severe pain       PARoxetine 25 MG 24 hr tablet    PAXIL-CR    30 tablet    Take 1 tablet (25 mg) by mouth every morning       PROSTAT PO      Take by mouth 2 times daily 15gm/1oz       traMADol 50 MG tablet    ULTRAM    28 tablet    Take 1 tablet (50 mg) by mouth every 6 hours as needed       TYLENOL 325 MG tablet   Generic drug:  acetaminophen      Take 650 mg by mouth every 4 hours as needed for mild pain

## 2017-02-16 ENCOUNTER — OFFICE VISIT - HEALTHEAST (OUTPATIENT)
Dept: GERIATRICS | Facility: CLINIC | Age: 67
End: 2017-02-16

## 2017-02-16 DIAGNOSIS — L89.323 DECUBITUS ULCER OF BUTTOCK, LEFT, STAGE III: ICD-10-CM

## 2017-02-16 DIAGNOSIS — Z87.440 HISTORY OF RECURRENT UTIS: ICD-10-CM

## 2017-02-16 DIAGNOSIS — G35 MULTIPLE SCLEROSIS (H): ICD-10-CM

## 2017-02-16 DIAGNOSIS — N20.0 RECURRENT NEPHROLITHIASIS: ICD-10-CM

## 2017-02-16 DIAGNOSIS — G89.4 CHRONIC PAIN SYNDROME: ICD-10-CM

## 2017-02-16 DIAGNOSIS — F32.A DEPRESSION, UNSPECIFIED DEPRESSION TYPE: ICD-10-CM

## 2017-02-16 DIAGNOSIS — N31.9 NEUROGENIC BLADDER: ICD-10-CM

## 2017-02-16 DIAGNOSIS — M62.838 MUSCLE SPASMS OF BOTH LOWER EXTREMITIES: ICD-10-CM

## 2017-02-19 ASSESSMENT — MIFFLIN-ST. JEOR: SCORE: 1257.06

## 2017-02-21 ENCOUNTER — OFFICE VISIT (OUTPATIENT)
Dept: UROLOGY | Facility: CLINIC | Age: 67
End: 2017-02-21

## 2017-02-21 VITALS
HEART RATE: 65 BPM | BODY MASS INDEX: 23.13 KG/M2 | WEIGHT: 139 LBS | DIASTOLIC BLOOD PRESSURE: 63 MMHG | SYSTOLIC BLOOD PRESSURE: 134 MMHG

## 2017-02-21 DIAGNOSIS — N20.0 CALCULUS OF KIDNEY: Primary | ICD-10-CM

## 2017-02-21 PROCEDURE — 87086 URINE CULTURE/COLONY COUNT: CPT | Performed by: UROLOGY

## 2017-02-21 ASSESSMENT — PAIN SCALES - GENERAL: PAINLEVEL: NO PAIN (0)

## 2017-02-21 NOTE — MR AVS SNAPSHOT
After Visit Summary   2/21/2017    Aga Fraga    MRN: [id number]           Patient Information     Date Of Birth          1950        Visit Information        Provider Department      2/21/2017 10:00 AM Darius Hyde MD [facility] for Prostate and Urologic Cancers        Today's Diagnoses     Calculus of kidney    -  1      Care Instructions    Follow up with Dr Ruelas or Genny to discuss Mitrofanoff leakage   Follow up with Dr Hyde in 6 months with a CT Scan on the same day    It was a pleasure meeting with you today.  Thank you for allowing me and my team the privilege of caring for you today.  YOU are the reason we are here, and I truly hope we provided you with the excellent service you deserve.  Please let us know if there is anything else we can do for you so that we can be sure you are leaving completely satisfied with your care experience.                Follow-ups after your visit        Your next 10 appointments already scheduled     Mar 13, 2017  2:30 PM CDT   (Arrive by 2:15 PM)   Return Visit with Phil Ruelas MD   [facility]state and Urologic Cancers ([facility])    [address] [place] [address]  4th [facility] [address]   [phone]            Mar 29, 2017  1:00 PM CDT   (Arrive by 12:45 PM)   Return Visit with  Pmr Nurse   [facility] Physical Medicine and Rehabilitation ([facility])    [address]  3rd [facility] [address]   [phone]            Apr 03, 2017  2:30 PM CDT   (Arrive by 2:15 PM)   Return Visit with Greg Royal DO   [facility]state and Urologic Cancers ([facility])    [address]  [address]th [facility] [address]   [phone]            Aug 22, 2017  2:00 PM CDT   (Arrive by 1:45 PM)   Return Visit with Darius Hyde MD   [facility] for  Prostate and Urologic Cancers ([facility])    [address]  4th [facility] [address] [phone]              Future tests that were ordered for you today     Open Future Orders        Priority Expected Expires Ordered    CT Abdomen pelvis w/o contrast Routine 2017    Urine Culture Aerobic Bacterial Routine  2018            Who to contact     Please call your clinic at [phone] to:    Ask questions about your health    Make or cancel appointments    Discuss your medicines    Learn about your test results    Speak to your doctor   If you have compliments or concerns about an experience at your clinic, or if you wish to file a complaint, please contact [facility] Patient Relations at [phone] or email us at [email or web address]         Additional Information About Your Visit        [employer] Information     [employer] is an electronic gateway that provides easy, online access to your medical records. With [employer], you can request a clinic appointment, read your test results, renew a prescription or communicate with your care team.     To sign up for [employer] visit the website at [email or web address]   You will be asked to enter the access code listed below, as well as some personal information. Please follow the directions to create your username and password.     Your access code is: [email or web address]  Expires: 2017  9:00 AM     Your access code will  in 90 days. If you need help or a new code, please contact your [facility] or call [phone] for assistance.        Care EveryWhere ID     This is your Care EveryWhere ID. This could be used by other organizations to access your [facility] medical records  [id number]        Your Vitals Were     Pulse BMI (Body Mass Index)                65 23.13 kg/m2           Blood Pressure from Last 3  Encounters:   02/21/17 134/63   01/13/17 121/50   12/19/16 162/78    Weight from Last 3 Encounters:   02/21/17 63 kg (139 lb)   01/12/17 63.3 kg (139 lb 8 oz)   12/19/16 71.6 kg (157 lb 13.6 oz)                 Today's Medication Changes          These changes are accurate as of: 2/21/17 12:02 PM.  If you have any questions, ask your nurse or doctor.               These medicines have changed or have updated prescriptions.        Dose/Directions    baclofen 20 MG tablet   Commonly known as:  LIORESAL   This may have changed:    - when to take this  - additional instructions   Used for:  Multiple sclerosis (H), Muscle spasm        Dose:  10 mg   Take 0.5 tablets (10 mg) by mouth 4 times daily as needed for muscle spasms   Quantity:  120 tablet   Refills:  3                Primary Care Provider Office Phone # Fax #    Astrid RUTH Navingrant [phone]       [facility] AFTER HOURS [address] [place] [address]        Thank you!     Thank you for choosing [facility] UROLOGY AND [facility] FOR PROSTATE AND UROLOGIC CANCERS  for your care. Our goal is always to provide you with excellent care. Hearing back from our patients is one way we can continue to improve our services. Please take a few minutes to complete the written survey that you may receive in the mail after your visit with us. Thank you!             Your Updated Medication List - Protect others around you: Learn how to safely use, store and throw away your medicines at www.disposemymeds.org.          This list is accurate as of: 2/21/17 12:02 PM.  Always use your most recent med list.                   Brand Name Dispense Instructions for use    atenolol 25 MG tablet    TENORMIN    30 tablet    Take 1 tablet (25 mg) by mouth 2 times daily       baclofen 20 MG tablet    LIORESAL    120 tablet    Take 0.5 tablets (10 mg) by mouth 4 times daily as needed for muscle spasms       calcium carbonate 500 MG tablet    OS-EVELINE 500 mg [place]. [place]    90 tablet     Take 1 tablet (500 mg) by mouth daily       cholecalciferol 1000 UNIT tablet    vitamin D     Take 1,000 Units by mouth daily       collagenase ointment      Apply topically as needed (Wound care for pressure ulcers)       COPAXONE 40 MG/ML Sosy   Generic drug:  Glatiramer Acetate      Inject 1 mL Subcutaneous three times a week Every Tuesday, Thursday and Saturday       ferrous sulfate 325 (65 FE) MG tablet    IRON     Take 325 mg by mouth daily (with breakfast)       fluticasone 50 MCG/ACT spray    FLONASE     Spray 1 spray into both nostrils daily as needed for rhinitis or allergies       gabapentin 100 MG capsule    NEURONTIN    90 capsule    Take 1-2 capsules (100-200 mg) by mouth 3 times daily Take 100 mg (1 capsule) by mouth in the morning, 100 mg at noon, 100 mg in the afternoon, and 200 mg (2 capsules) at bedtimeSee Admin Instructions Take 100 mg (1 capsule) by mouth in the morning, 100 mg at noon, 100 mg in the afternoon, and 200 mg (2 capsules) at bedtime       methyl salicylate-menthol Oint     1 Tube    Apply 2 g topically every 6 hours as needed (pain).       multivitamin, therapeutic with minerals Tabs tablet     30 each    Take 1 tablet by mouth daily       nystatin-triamcinolone cream    MYCOLOG II     Apply topically 2 times daily as needed       OMEGA-3 PO      Take 300-1,000 mg by mouth daily       OMEPRAZOLE PO      Take 20 mg by mouth 2 times daily (before meals)       oxyCODONE 5 MG IR tablet    ROXICODONE    30 tablet    Take 1 tablet (5 mg) by mouth every 4 hours as needed for moderate to severe pain       PARoxetine 25 MG 24 hr tablet    PAXIL-CR    30 tablet    Take 1 tablet (25 mg) by mouth every morning       PROSTAT PO      Take by mouth 2 times daily 15gm/1oz       traMADol 50 MG tablet    ULTRAM    28 tablet    Take 1 tablet (50 mg) by mouth every 6 hours as needed       TYLENOL 325 MG tablet   Generic drug:  acetaminophen      Take 650 mg by mouth every 4 hours as needed for mild  pain

## 2017-02-21 NOTE — LETTER
2/21/2017       RE: Aga Fraga  [address] [place] [address]     Dear Colleague,    Thank you for referring your patient, Aga Fraga, to the [facility] UROLOGY AND INST FOR PROSTATE AND UROLOGIC CANCERS at [facility]. Please see a copy of my visit note below.           UROLOGY FOLLOW-UP NOTE          Chief Complaint:   Today I had the pleasure of seeing Ms. Aga Fraga in follow-up for a chief complaint of bilateral renal calculi         Interval Update    Aga Fraga is a very pleasant 66 year old female last seen 2 months ago.  At our last visit she was doing well after second PCNL for large renal, struvite staghorn calculous.  Since last being seen she notes a possible UTI, though is unclear if this was symptomatic infection or just a positive urine culture.  She has had no flank pain or hematuria but does note persistent leaking from her mitrofanoff.  This has been a problem for approx 1 year and a prior attempt was made to do a bulking injection which has not provided the desired result in terms of leakage.         Physical Exam:   Patient is a 66 year old  female   Vitals: Blood pressure 134/63, pulse 65, weight 63 kg (139 lb), not currently breastfeeding.  General Appearance Adult: Alert, no acute distress, oriented  HENT: throat/mouth:normal, good dentition, neck contracted to left side  Neck: No adenopathy,masses or thyromegaly  Lungs: no respiratory distress, or pursed lip breathing  Heart: No obvious jugular venous distension present  Abdomen: soft, nontender, no organomegaly or masses, Body mass index is 23.13 kg/(m^2).  Lymphatics: No cervical or supraclavicular adenopathy  Musculoskeltal: full upper extremity motion, otherwise sitting in wheelchair  Skin: no suspicious lesions or rashes  Neuro: Alert, oriented, speech and mentation normal  Psych: affect and mood normal      Labs and Pathology:    I personally reviewed all  applicable laboratory data and went over findings with patient  Significant for:    CBC RESULTS:  Recent Labs   Lab Test  01/13/17   0602  01/12/17   1612  01/12/17   1052  12/19/16   1550   WBC  5.1  7.0  5.8  4.9   HGB  9.4*  10.9*  11.8  10.7*   PLT  150  168  193  130*        BMP RESULTS:  Recent Labs   Lab Test  01/13/17   0602  01/12/17   1612  01/12/17   1052  12/19/16   1550   NA  144  142  143  144   POTASSIUM  4.1  4.3  4.1  4.3   CHLORIDE  107  105  105  106   CO2  34*  32  33*  33*   ANIONGAP  3  5  5  5   GLC  104*  139*  107*  91   BUN  13 16 17  9   CR  0.50*  0.43*  0.38*  0.48*   GFRESTIMATED  >90  Non  GFR Calc    >90  Non  GFR Calc    >90  Non  GFR Calc    >90  Non  GFR Calc     GFRESTBLACK  >90   GFR Calc    >90   GFR Calc    >90   GFR Calc    >90   GFR Calc     EVELINE  8.9  9.3  10.0  9.6       UA RESULTS:   Recent Labs   Lab Test  07/12/16   0922  06/10/16   0023  02/04/15   1420   SG  1.010  1.021  1.010   URINEPH  7.0  8.0*  5.5   NITRITE  Positive*  Positive*  Negative   RBCU  >182*  43*  12*   WBCU  >182*  >182*  28*         Imaging:    I personally reviewed all applicable imaging and went over findings with patient.  Significant for CT 2/21/17  Impression:   1. 1 mm stone in the upper pole the right kidney, and 2 mm stone in  the lumen of the urinary bladder. No obstruction of the urinary  collecting system.  2. Other chronic and incidental findings as above.         Past Medical History:     Past Medical History   Diagnosis Date     Anxiety      Cardiomyopathy (H)      Chronic pain      Decubitus ulcer      buttocks, stage II     Frequent UTI      GI bleed      massive duodenal bulb ulcer requiring GDA embolization      Hypertrophic cardiomyopathy (H)      MRSA (methicillin resistant Staphylococcus aureus)      MS (multiple sclerosis) (H)      Multiple sclerosis  (H)      Nephrolithiasis      Neurogenic bladder      Rectal prolapse             Past Surgical History:     Past Surgical History   Procedure Laterality Date     Cholecystectomy       Adrenalectomy       Mitrofanoff procedure (appendix conduit)       Fern     Revision mitrofanoff child  9/11/2000     removal of Mitrofanoff (Fern)     Bladder augmentation  9/11/2000     CREATION OF NEW RYAN STOMA     Laser holmium nephrolithotomy via percutaneous nephrostomy  9/11/2013     Procedure: LASER HOLMIUM NEPHROLITHOTOMY VIA PERCUTANEOUS NEPHROSTOMY;  Left Percutaneous Access, Left Percutaneous Nephrolithotomy, Nephroscopy, Placement of 12 Fr Coude to Mitrofanoff;  Surgeon: Dao Ureña MD;  [facility]     Tubal ligation       Dilation and curettage       x3     C fix rectal prolapse,perineal appr       Laser holmium nephrolithotomy via percutaneous nephrostomy  11/6/2013     Procedure: LASER HOLMIUM NEPHROLITHOTOMY VIA PERCUTANEOUS NEPHROSTOMY;  Second Look Left Percutaneous Nephrolithotomy,  ;  Surgeon: Dao Ureña MD;  [facility]     Revise pump baclofen  4/28/2014     Procedure: REVISE PUMP BACLOFEN;  Surgeon: Milton Gutierrez MD;  [facility]     Esophagoscopy, gastroscopy, duodenoscopy (egd), combined N/A 2/23/2015     Procedure: COMBINED ESOPHAGOSCOPY, GASTROSCOPY, DUODENOSCOPY (EGD);  Surgeon: Denzel Ferrara Chi, MD;  [facility]     Picc insertion Right 6/14/2016     4fr SL BioFlo PICC, 36cm (1cm external) in the R medial brachial vein     Cystoscopy, inject vesicoureteral reflux gel N/A 10/26/2016     Procedure: CYSTOSCOPY, INJECT VESICOURETERAL REFLUX GEL;  Surgeon: Phil Ruelas MD;  [facility]     Laser holmium nephrolithotomy via percutaneous nephrostomy Left 12/15/2016     Procedure: LASER HOLMIUM NEPHROLITHOTOMY VIA PERCUTANEOUS NEPHROSTOMY;  Surgeon: Darius Hyde MD;  [facility]     Percutaneous nephrolithotomy Left 12/19/2016      Procedure: PERCUTANEOUS NEPHROLITHOTOMY;  Surgeon: Darius Hyde MD;  [facility]     Percutaneous nephrolithotomy Right 2017     Procedure: PERCUTANEOUS NEPHROLITHOTOMY;  Surgeon: Darius Hyde MD;  [facility]            Medications     Current Outpatient Prescriptions   Medication     cholecalciferol (VITAMIN D3) 1000 UNIT tablet     Glatiramer Acetate (COPAXONE) 40 MG/ML SOSY     fluticasone (FLONASE) 50 MCG/ACT spray     nystatin-triamcinolone (MYCOLOG II) cream     Omega-3 Fatty Acids (OMEGA-3 PO)     Pollen Extracts (PROSTAT PO)     collagenase ointment     acetaminophen (TYLENOL) 325 MG tablet     OMEPRAZOLE PO     ferrous sulfate (IRON) 325 (65 FE) MG tablet     traMADol (ULTRAM) 50 MG tablet     gabapentin (NEURONTIN) 100 MG capsule     PARoxetine (PAXIL-CR) 25 MG 24 hr tablet     atenolol (TENORMIN) 25 MG tablet     calcium carbonate (OS-EVELINE 500 MG [place]. [place]) 500 MG tablet     multivitamin, therapeutic with minerals (THERA-VIT-M) TABS     baclofen (LIORESAL) 20 MG tablet     methyl salicylate-menthol (BENGAY) OINT     oxyCODONE (ROXICODONE) 5 MG IR tablet     No current facility-administered medications for this visit.             Family History:     Family History   Problem Relation Age of Onset     DIABETES Mother      CANCER Mother      small cell lung CA;      HEART DISEASE Sister      HOCM;  at age 42            Social History:     Social History     Social History     Marital status:      Spouse name: N/A     Number of children: N/A     Years of education: N/A     Occupational History     Not on file.     Social History Main Topics     Smoking status: Former Smoker     Packs/day: 1.00     Years: 20.00     Types: Cigarettes     Quit date: 1980     Smokeless tobacco: Never Used     Alcohol use No     Drug use: No     Sexual activity: No     Other Topics Concern     Not on file     Social History Narrative    Mom  at age 50's from breast cancer.  "   Dad  at age 95 from old age.        In a relationship at her living facility- \"companionship\".    Brother 70 years old    Sister 61 years old     Second sister  from cardiomyopathy    2 daughters alive and well                Allergies:   Blood transfusion related (informational only); Aspartame; Blood-group specific substance; Cephalexin; Diuretic; Fructose; Furosemide; Spironolactone; Sulfamethoxazole w/trimethoprim; and Lanolin oil         Review of Systems:  From intake questionnaire   Negative 14 system review except as noted on HPI, nurse's note.           Assessment/Plan     Ms. Fraga is a 67 yo F with recurrent struvite staghorn calculous  -Repeat urine culture today to ensure no urea splitting bacteria  -Will refer to Dr. Tapia for persistent leakage from mitrofanoff  -Encouraged high fluid intake to prevent stones, low sodium, moderate calcium, modest animal protein  -Should get KUB q6m to assess for stone growth    Darius Hyde MD    CC:  Astrid Marroquin    "

## 2017-02-21 NOTE — PATIENT INSTRUCTIONS
Follow up with Dr Ruelas or Genny to discuss Mitrofanoff leakage   Follow up with Dr Hyde in 6 months with a CT Scan on the same day    It was a pleasure meeting with you today.  Thank you for allowing me and my team the privilege of caring for you today.  YOU are the reason we are here, and I truly hope we provided you with the excellent service you deserve.  Please let us know if there is anything else we can do for you so that we can be sure you are leaving completely satisfied with your care experience.

## 2017-02-22 LAB
BACTERIA SPEC CULT: NORMAL
Lab: NORMAL
MICRO REPORT STATUS: NORMAL
SPECIMEN SOURCE: NORMAL

## 2017-02-25 NOTE — PROGRESS NOTES
UROLOGY FOLLOW-UP NOTE          Chief Complaint:   Today I had the pleasure of seeing Ms. Aga Fraga in follow-up for a chief complaint of bilateral renal calculi         Interval Update    Aga Fraga is a very pleasant 66 year old female last seen 2 months ago.  At our last visit she was doing well after second PCNL for large renal, struvite staghorn calculous.  Since last being seen she notes a possible UTI, though is unclear if this was symptomatic infection or just a positive urine culture.  She has had no flank pain or hematuria but does note persistent leaking from her mitrofanoff.  This has been a problem for approx 1 year and a prior attempt was made to do a bulking injection which has not provided the desired result in terms of leakage.         Physical Exam:   Patient is a 66 year old  female   Vitals: Blood pressure 134/63, pulse 65, weight 63 kg (139 lb), not currently breastfeeding.  General Appearance Adult: Alert, no acute distress, oriented  HENT: throat/mouth:normal, good dentition, neck contracted to left side  Neck: No adenopathy,masses or thyromegaly  Lungs: no respiratory distress, or pursed lip breathing  Heart: No obvious jugular venous distension present  Abdomen: soft, nontender, no organomegaly or masses, Body mass index is 23.13 kg/(m^2).  Lymphatics: No cervical or supraclavicular adenopathy  Musculoskeltal: full upper extremity motion, otherwise sitting in wheelchair  Skin: no suspicious lesions or rashes  Neuro: Alert, oriented, speech and mentation normal  Psych: affect and mood normal      Labs and Pathology:    I personally reviewed all applicable laboratory data and went over findings with patient  Significant for:    CBC RESULTS:  Recent Labs   Lab Test  01/13/17   0602  01/12/17   1612  01/12/17   1052  12/19/16   1550   WBC  5.1  7.0  5.8  4.9   HGB  9.4*  10.9*  11.8  10.7*   PLT  150  168  193  130*        BMP RESULTS:  Recent Labs   Lab Test  01/13/17    0602  01/12/17   1612  01/12/17   1052  12/19/16   1550   NA  144  142  143  144   POTASSIUM  4.1  4.3  4.1  4.3   CHLORIDE  107  105  105  106   CO2  34*  32  33*  33*   ANIONGAP  3  5  5  5   GLC  104*  139*  107*  91   BUN  13 16  17  9   CR  0.50*  0.43*  0.38*  0.48*   GFRESTIMATED  >90  Non  GFR Calc    >90  Non  GFR Calc    >90  Non  GFR Calc    >90  Non  GFR Calc     GFRESTBLACK  >90   GFR Calc    >90   GFR Calc    >90   GFR Calc    >90   GFR Calc     EVELINE  8.9  9.3  10.0  9.6       UA RESULTS:   Recent Labs   Lab Test  07/12/16   0922  06/10/16   0023  02/04/15   1420   SG  1.010  1.021  1.010   URINEPH  7.0  8.0*  5.5   NITRITE  Positive*  Positive*  Negative   RBCU  >182*  43*  12*   WBCU  >182*  >182*  28*         Imaging:    I personally reviewed all applicable imaging and went over findings with patient.  Significant for CT 2/21/17  Impression:   1. 1 mm stone in the upper pole the right kidney, and 2 mm stone in  the lumen of the urinary bladder. No obstruction of the urinary  collecting system.  2. Other chronic and incidental findings as above.         Past Medical History:     Past Medical History   Diagnosis Date     Anxiety      Cardiomyopathy (H)      Chronic pain      Decubitus ulcer      buttocks, stage II     Frequent UTI      GI bleed      massive duodenal bulb ulcer requiring GDA embolization      Hypertrophic cardiomyopathy (H)      MRSA (methicillin resistant Staphylococcus aureus)      MS (multiple sclerosis) (H)      Multiple sclerosis (H)      Nephrolithiasis      Neurogenic bladder      Rectal prolapse             Past Surgical History:     Past Surgical History   Procedure Laterality Date     Cholecystectomy       Adrenalectomy       Mitrofanoff procedure (appendix conduit)       Fern     Revision mitrofanoff child  9/11/2000     removal of Mitrofanoff  (Fern)     Bladder augmentation  9/11/2000     CREATION OF NEW RYAN STOMA     Laser holmium nephrolithotomy via percutaneous nephrostomy  9/11/2013     Procedure: LASER HOLMIUM NEPHROLITHOTOMY VIA PERCUTANEOUS NEPHROSTOMY;  Left Percutaneous Access, Left Percutaneous Nephrolithotomy, Nephroscopy, Placement of 12 Fr Coude to Mitrofanoff;  Surgeon: Dao Ureña MD;  [facility]     Tubal ligation       Dilation and curettage       x3     C fix rectal prolapse,perineal appr       Laser holmium nephrolithotomy via percutaneous nephrostomy  11/6/2013     Procedure: LASER HOLMIUM NEPHROLITHOTOMY VIA PERCUTANEOUS NEPHROSTOMY;  Second Look Left Percutaneous Nephrolithotomy,  ;  Surgeon: Dao Ureña MD;  [facility]     Revise pump baclofen  4/28/2014     Procedure: REVISE PUMP BACLOFEN;  Surgeon: Milton Gutierrez MD;  [facility]     Esophagoscopy, gastroscopy, duodenoscopy (egd), combined N/A 2/23/2015     Procedure: COMBINED ESOPHAGOSCOPY, GASTROSCOPY, DUODENOSCOPY (EGD);  Surgeon: Denzel Ferrara Chi, MD;  [facility]     Picc insertion Right 6/14/2016     4fr SL BioFlo PICC, 36cm (1cm external) in the R medial brachial vein     Cystoscopy, inject vesicoureteral reflux gel N/A 10/26/2016     Procedure: CYSTOSCOPY, INJECT VESICOURETERAL REFLUX GEL;  Surgeon: Phil Ruelas MD;  [facility]     Laser holmium nephrolithotomy via percutaneous nephrostomy Left 12/15/2016     Procedure: LASER HOLMIUM NEPHROLITHOTOMY VIA PERCUTANEOUS NEPHROSTOMY;  Surgeon: Darius Hyde MD;  [facility]     Percutaneous nephrolithotomy Left 12/19/2016     Procedure: PERCUTANEOUS NEPHROLITHOTOMY;  Surgeon: Darius Hyde MD;  [facility]     Percutaneous nephrolithotomy Right 1/12/2017     Procedure: PERCUTANEOUS NEPHROLITHOTOMY;  Surgeon: Darius Hyde MD;  [facility]            Medications     Current Outpatient Prescriptions   Medication      "cholecalciferol (VITAMIN D3) 1000 UNIT tablet     Glatiramer Acetate (COPAXONE) 40 MG/ML SOSY     fluticasone (FLONASE) 50 MCG/ACT spray     nystatin-triamcinolone (MYCOLOG II) cream     Omega-3 Fatty Acids (OMEGA-3 PO)     Pollen Extracts (PROSTAT PO)     collagenase ointment     acetaminophen (TYLENOL) 325 MG tablet     OMEPRAZOLE PO     ferrous sulfate (IRON) 325 (65 FE) MG tablet     traMADol (ULTRAM) 50 MG tablet     gabapentin (NEURONTIN) 100 MG capsule     PARoxetine (PAXIL-CR) 25 MG 24 hr tablet     atenolol (TENORMIN) 25 MG tablet     calcium carbonate (OS-EVELINE 500 MG [place]. [place]) 500 MG tablet     multivitamin, therapeutic with minerals (THERA-VIT-M) TABS     baclofen (LIORESAL) 20 MG tablet     methyl salicylate-menthol (BENGAY) OINT     oxyCODONE (ROXICODONE) 5 MG IR tablet     No current facility-administered medications for this visit.             Family History:     Family History   Problem Relation Age of Onset     DIABETES Mother      CANCER Mother      small cell lung CA;      HEART DISEASE Sister      HOCM;  at age 42            Social History:     Social History     Social History     Marital status:      Spouse name: N/A     Number of children: N/A     Years of education: N/A     Occupational History     Not on file.     Social History Main Topics     Smoking status: Former Smoker     Packs/day: 1.00     Years: 20.00     Types: Cigarettes     Quit date: 1980     Smokeless tobacco: Never Used     Alcohol use No     Drug use: No     Sexual activity: No     Other Topics Concern     Not on file     Social History Narrative    Mom  at age 50's from breast cancer.    Dad  at age 95 from old age.        In a relationship at her living facility- \"companionship\".    Brother 70 years old    Sister 61 years old     Second sister  from cardiomyopathy    2 daughters alive and well                Allergies:   Blood transfusion related (informational only); Aspartame; " Blood-group specific substance; Cephalexin; Diuretic; Fructose; Furosemide; Spironolactone; Sulfamethoxazole w/trimethoprim; and Lanolin oil         Review of Systems:  From intake questionnaire   Negative 14 system review except as noted on HPI, nurse's note.           Assessment/Plan     Ms. Fraga is a 65 yo F with recurrent struvite staghorn calculous  -Repeat urine culture today to ensure no urea splitting bacteria  -Will refer to Dr. Tapia for persistent leakage from mitrofanoff  -Encouraged high fluid intake to prevent stones, low sodium, moderate calcium, modest animal protein  -Should get KUB q6m to assess for stone growth    Darius Hyde MD    CC:  Astrid Marroquin

## 2017-03-10 ENCOUNTER — PRE VISIT (OUTPATIENT)
Dept: UROLOGY | Facility: CLINIC | Age: 67
End: 2017-03-10

## 2017-03-10 NOTE — TELEPHONE ENCOUNTER
Patient with a leaking mitrofanoff coming in for follow up. Chart reviewed and bulking agent did not work per Dr. Hyde's note. No need for a call.

## 2017-03-27 DIAGNOSIS — N31.9 NEUROGENIC BLADDER: Primary | ICD-10-CM

## 2017-03-29 ENCOUNTER — PRE VISIT (OUTPATIENT)
Dept: UROLOGY | Facility: CLINIC | Age: 67
End: 2017-03-29

## 2017-03-29 ENCOUNTER — ALLIED HEALTH/NURSE VISIT (OUTPATIENT)
Dept: PHYSICAL MEDICINE AND REHAB | Facility: CLINIC | Age: 67
End: 2017-03-29

## 2017-03-29 DIAGNOSIS — M62.838 SPASM OF MUSCLE: Primary | ICD-10-CM

## 2017-03-29 DIAGNOSIS — Z97.8 PRESENCE OF INTRATHECAL PUMP: ICD-10-CM

## 2017-03-29 DIAGNOSIS — G35 MULTIPLE SCLEROSIS (H): ICD-10-CM

## 2017-03-29 NOTE — MR AVS SNAPSHOT
After Visit Summary   3/29/2017    Aga Fraga    MRN: [id number]           Patient Information     Date Of Birth          1950        Visit Information        Provider Department      3/29/2017 1:00 PM Nurse, Malcolm Pmr [facility] Physical Medicine and Rehabilitation        Today's Diagnoses     Spasm of muscle    -  1    Multiple sclerosis (H)        Presence of intrathecal pump           Follow-ups after your visit        Your next 10 appointments already scheduled     Apr 03, 2017  2:30 PM CDT   (Arrive by 2:15 PM)   Return Visit with Greg Royal DO   [facility] Urology and Inst for Prostate and Urologic Cancers ([facility])    [address]  [address] [facility] [address]   [phone]            Apr 17, 2017  9:30 AM CDT   (Arrive by 9:15 AM)   Return Visit with Phil Ruelas MD   [facility]rology and [facility] for Prostate and Urologic Cancers ([facility])    [address] [place] [address]  [address] [facility] [address] [phone]            May 10, 2017  1:30 PM CDT   (Arrive by 1:15 PM)   Return Visit with  Pmr Nurse   [facility] Physical Medicine and Rehabilitation ([facility])    [address]  [address] [facility] [address] [phone]            Aug 22, 2017  2:00 PM CDT   (Arrive by 1:45 PM)   Return Visit with Darius Hyde MD   [facility] Urology and [facility] for Prostate and Urologic Cancers ([facility])    [address] [place] [address]  [address] [facility] [address] [phone]              Who to contact     Please call your clinic at [phone] to:    Ask questions about your health    Make or cancel appointments    Discuss your medicines    Learn about your test results    Speak to your doctor   If you have compliments or concerns about an experience at your clinic, or if you wish to file a complaint, please contact [facility]  [facility] Patient Relations at [phone] or email us at [email or web address]         Additional Information About Your Visit        [employer] Information     [employer] is an electronic gateway that provides easy, online access to your medical records. With [employer], you can request a clinic appointment, read your test results, renew a prescription or communicate with your care team.     To sign up for [employer] visit the website at [email or web address]   You will be asked to enter the access code listed below, as well as some personal information. Please follow the directions to create your username and password.     Your access code is: [email or web address]  Expires: 2017 10:00 AM     Your access code will  in 90 days. If you need help or a new code, please contact your [facility] or call [phone] for assistance.        Care EveryWhere ID     This is your Care EveryWhere ID. This could be used by other organizations to access your [place] medical records  [id number]         Blood Pressure from Last 3 Encounters:   17 134/63   17 121/50   16 162/78    Weight from Last 3 Encounters:   17 63 kg (139 lb)   17 63.3 kg (139 lb 8 oz)   16 71.6 kg (157 lb 13.6 oz)              Today, you had the following     No orders found for display         Today's Medication Changes          These changes are accurate as of: 3/29/17  3:48 PM.  If you have any questions, ask your nurse or doctor.               These medicines have changed or have updated prescriptions.        Dose/Directions    baclofen 20 MG tablet   Commonly known as:  LIORESAL   This may have changed:    - when to take this  - additional instructions   Used for:  Multiple sclerosis (H), Muscle spasm        Dose:  10 mg   Take 0.5 tablets (10 mg) by mouth 4 times daily as needed for muscle spasms   Quantity:  120 tablet   Refills:  3                Primary Care  Provider Office Phone # Fax #    Astrid Marroquin [phone]       [facility] AFTER HOURS [address] [place] [address]        Thank you!     Thank you for choosing [facility] PHYSICAL MEDICINE AND REHABILITATION  for your care. Our goal is always to provide you with excellent care. Hearing back from our patients is one way we can continue to improve our services. Please take a few minutes to complete the written survey that you may receive in the mail after your visit with us. Thank you!             Your Updated Medication List - Protect others around you: Learn how to safely use, store and throw away your medicines at www.disposemymeds.org.          This list is accurate as of: 3/29/17  3:48 PM.  Always use your most recent med list.                   Brand Name Dispense Instructions for use    atenolol 25 MG tablet    TENORMIN    30 tablet    Take 1 tablet (25 mg) by mouth 2 times daily       baclofen 20 MG tablet    LIORESAL    120 tablet    Take 0.5 tablets (10 mg) by mouth 4 times daily as needed for muscle spasms       calcium carbonate 500 MG tablet    OS-EVELINE 500 mg [place]. Ca    90 tablet    Take 1 tablet (500 mg) by mouth daily       cholecalciferol 1000 UNIT tablet    vitamin D     Take 1,000 Units by mouth daily       collagenase ointment      Apply topically as needed (Wound care for pressure ulcers)       COPAXONE 40 MG/ML Sosy   Generic drug:  Glatiramer Acetate      Inject 1 mL Subcutaneous three times a week Every Tuesday, Thursday and Saturday       ferrous sulfate 325 (65 FE) MG tablet    IRON     Take 325 mg by mouth daily (with breakfast)       fluticasone 50 MCG/ACT spray    FLONASE     Spray 1 spray into both nostrils daily as needed for rhinitis or allergies       gabapentin 100 MG capsule    NEURONTIN    90 capsule    Take 1-2 capsules (100-200 mg) by mouth 3 times daily Take 100 mg (1 capsule) by mouth in the morning, 100 mg at noon, 100 mg in the afternoon, and 200  mg (2 capsules) at bedtimeSee Admin Instructions Take 100 mg (1 capsule) by mouth in the morning, 100 mg at noon, 100 mg in the afternoon, and 200 mg (2 capsules) at bedtime       methyl salicylate-menthol Oint     1 Tube    Apply 2 g topically every 6 hours as needed (pain).       multivitamin, therapeutic with minerals Tabs tablet     30 each    Take 1 tablet by mouth daily       nystatin-triamcinolone cream    MYCOLOG II     Apply topically 2 times daily as needed       OMEGA-3 PO      Take 300-1,000 mg by mouth daily       OMEPRAZOLE PO      Take 20 mg by mouth 2 times daily (before meals)       oxyCODONE 5 MG IR tablet    ROXICODONE    30 tablet    Take 1 tablet (5 mg) by mouth every 4 hours as needed for moderate to severe pain       PARoxetine 25 MG 24 hr tablet    PAXIL-CR    30 tablet    Take 1 tablet (25 mg) by mouth every morning       PROSTAT PO      Take by mouth 2 times daily 15gm/1oz       traMADol 50 MG tablet    ULTRAM    28 tablet    Take 1 tablet (50 mg) by mouth every 6 hours as needed       TYLENOL 325 MG tablet   Generic drug:  acetaminophen      Take 650 mg by mouth every 4 hours as needed for mild pain

## 2017-03-29 NOTE — PROGRESS NOTES
Intrathecal Pump Clinic Note    Aga Fraga is being seen in the Intrathecal Baclofen Pump Clinic for a pump refill for Spasticity secondary to MS.      VERIFICATION OF PATIENT IDENTIFICATION AND PROCEDURE     Initials   Patient Name lmd   Patient  lmd   Procedure Verified by: bonnie     Prior to the start of the procedure and with procedural staff participation, I verbally confirmed the patient s identity using two indicators, relevant allergies, that the procedure was appropriate and matched the consent or emergent situation, and that the correct equipment/implants were available. Immediately prior to starting the procedure I conducted the Time Out with the procedural staff and re-confirmed the patient s name, procedure, and site/side. (The Joint Commission universal protocol was followed.)  Yes      INTERIM HISTORY:  Aga has not been doing well since our last visit.  She complains of leaking at supra pubic site.  Will follow up with Urology.    INTERIM PAST MEDICAL HISTORY:  There have been other physician visits since our last appointment.  She has been seen by Dr. Ruelas for bladder management.   She IS NOT currently involved in therapy.  She is also working on ADL's for a home program.  She has made functional gains in .    REVIEW OF SYSTEMS:  She has pain managed with Baclofen Pump and oral meds.  She has difficulty with bladder issues. See above  She denies having issues with her bowels.  Patient denies side effects from the intrathecal Baclofen.    Current Outpatient Prescriptions   Medication Sig Dispense Refill     oxyCODONE (ROXICODONE) 5 MG IR tablet Take 1 tablet (5 mg) by mouth every 4 hours as needed for moderate to severe pain (Patient not taking: Reported on 2017) 30 tablet 0     cholecalciferol (VITAMIN D3) 1000 UNIT tablet Take 1,000 Units by mouth daily       Glatiramer Acetate (COPAXONE) 40 MG/ML SOSY Inject 1 mL Subcutaneous three times a week Every Tuesday, Thursday and  Saturday       fluticasone (FLONASE) 50 MCG/ACT spray Spray 1 spray into both nostrils daily as needed for rhinitis or allergies       nystatin-triamcinolone (MYCOLOG II) cream Apply topically 2 times daily as needed       Omega-3 Fatty Acids (OMEGA-3 PO) Take 300-1,000 mg by mouth daily       Pollen Extracts (PROSTAT PO) Take by mouth 2 times daily 15gm/1oz       collagenase ointment Apply topically as needed (Wound care for pressure ulcers)       acetaminophen (TYLENOL) 325 MG tablet Take 650 mg by mouth every 4 hours as needed for mild pain       OMEPRAZOLE PO Take 20 mg by mouth 2 times daily (before meals)        ferrous sulfate (IRON) 325 (65 FE) MG tablet Take 325 mg by mouth daily (with breakfast)       traMADol (ULTRAM) 50 MG tablet Take 1 tablet (50 mg) by mouth every 6 hours as needed 28 tablet      gabapentin (NEURONTIN) 100 MG capsule Take 1-2 capsules (100-200 mg) by mouth 3 times daily Take 100 mg (1 capsule) by mouth in the morning, 100 mg at noon, 100 mg in the afternoon, and 200 mg (2 capsules) at bedtimeSee Admin Instructions Take 100 mg (1 capsule) by mouth in the morning, 100 mg at noon, 100 mg in the afternoon, and 200 mg (2 capsules) at bedtime 90 capsule 0     PARoxetine (PAXIL-CR) 25 MG 24 hr tablet Take 1 tablet (25 mg) by mouth every morning 30 tablet      atenolol (TENORMIN) 25 MG tablet Take 1 tablet (25 mg) by mouth 2 times daily 30 tablet      calcium carbonate (OS-EVELINE 500 MG [place]. [place]) 500 MG tablet Take 1 tablet (500 mg) by mouth daily 90 tablet      multivitamin, therapeutic with minerals (THERA-VIT-M) TABS Take 1 tablet by mouth daily 30 each 0     baclofen (LIORESAL) 20 MG tablet Take 0.5 tablets (10 mg) by mouth 4 times daily as needed for muscle spasms (Patient taking differently: Take 10 mg by mouth 2 times daily Taken Every AM and PM. Also given up to three times throughout the day PRN) 120 tablet 3     methyl salicylate-menthol (BENGAY) OINT Apply 2 g topically every 6 hours  as needed (pain). 1 Tube 2       INTERIM SOCIAL HISTORY:  Social situation has not changed since the last visit.  She is unemployed.  [facility] is [facility] at [facility].    PHYSICAL EXAMINATION:  Interrogation of the Baclofen  pump shows that it is currently running at a Simple Continuous dose of 27.0 mcg/day. She also has a Flex (complex continuous) dose of 35.0 mcg/hour over 5 hours; 35.4 mcg/hour over 5 hours for total dose of 730.4 mcg/day.  Pump site is intact with no signs of infection, redness, swelling or seroma.      Clonus is not present.      Musculoskeletal contractures are present in the knees and ankles. She has bilateral knee flexion contractures with bilateral plantar flexion contractures, and her right ankle has an inversion flexion contracture.      Gait examination showed non-ambulatory.      Upper extremity motor control:  Unchanged from previous exam.      Lower extremity motor control/gait:  Unchanged from previous exam.      Oral motor control/speech:  Is optimized      Pain:  Is under adequate control.      Psychosocial:  Status is unchanged from the previous visit.    IMPRESSION/PLAN:  Visit Reason: Pump Refill  Refill Amount: 20 mL  NDC #: 2000 (32754-705-50)  Sterile Prep & Drape: Yes  Mililiters Withdrawn: 4.5  Mililiters Expected: 4.7  New Dose: 27.0 mcg/day, with flex dosing same as above  Alarm Date: 5/17/2017/2017  Replacement Interval: 45 months  Spasticity: Optimal  Therapeutic Level: Optimal    Next pump refill appointment 5/10/2017      Niru Yun RN  Under the supervision of  Chirag Harris MD  Department of Physical Medicine and Rehabilitation

## 2017-03-29 NOTE — TELEPHONE ENCOUNTER
Patient coming in for leaking mitrofanoff follow from Dr Hyde note. Message sent  To  to schedule renal US and labs prior to seeing Dr Royal. Records in epic. No need to call patient

## 2017-03-31 ENCOUNTER — OFFICE VISIT - HEALTHEAST (OUTPATIENT)
Dept: GERIATRICS | Facility: CLINIC | Age: 67
End: 2017-03-31

## 2017-03-31 DIAGNOSIS — I10 ESSENTIAL HYPERTENSION: ICD-10-CM

## 2017-03-31 DIAGNOSIS — N31.9 NEUROGENIC BLADDER: ICD-10-CM

## 2017-03-31 DIAGNOSIS — G35 MS (MULTIPLE SCLEROSIS) (H): ICD-10-CM

## 2017-03-31 DIAGNOSIS — I42.9 CARDIOMYOPATHY (H): ICD-10-CM

## 2017-03-31 DIAGNOSIS — N20.0 RENAL CALCULI: ICD-10-CM

## 2017-04-03 DIAGNOSIS — N31.9 NEUROGENIC BLADDER: ICD-10-CM

## 2017-04-03 DIAGNOSIS — T83.511A URINARY TRACT INFECTION ASSOCIATED WITH CATHETERIZATION OF URINARY TRACT, UNSPECIFIED INDWELLING URINARY CATHETER TYPE, INITIAL ENCOUNTER (H): ICD-10-CM

## 2017-04-03 DIAGNOSIS — N39.0 URINARY TRACT INFECTION ASSOCIATED WITH CATHETERIZATION OF URINARY TRACT, UNSPECIFIED INDWELLING URINARY CATHETER TYPE, INITIAL ENCOUNTER (H): ICD-10-CM

## 2017-04-03 LAB
ANION GAP SERPL CALCULATED.3IONS-SCNC: 5 MMOL/L (ref 3–14)
BUN SERPL-MCNC: 20 MG/DL (ref 7–30)
CALCIUM SERPL-MCNC: 9.9 MG/DL (ref 8.5–10.1)
CHLORIDE SERPL-SCNC: 105 MMOL/L (ref 94–109)
CO2 SERPL-SCNC: 31 MMOL/L (ref 20–32)
CREAT SERPL-MCNC: 0.37 MG/DL (ref 0.52–1.04)
GFR SERPL CREATININE-BSD FRML MDRD: ABNORMAL ML/MIN/1.7M2
GLUCOSE SERPL-MCNC: 127 MG/DL (ref 70–99)
POTASSIUM SERPL-SCNC: 4.7 MMOL/L (ref 3.4–5.3)
SODIUM SERPL-SCNC: 141 MMOL/L (ref 133–144)

## 2017-04-03 PROCEDURE — 87088 URINE BACTERIA CULTURE: CPT | Performed by: UROLOGY

## 2017-04-03 PROCEDURE — 87086 URINE CULTURE/COLONY COUNT: CPT | Performed by: UROLOGY

## 2017-04-03 PROCEDURE — 87186 SC STD MICRODIL/AGAR DIL: CPT | Performed by: UROLOGY

## 2017-04-05 LAB
BACTERIA SPEC CULT: ABNORMAL
Lab: ABNORMAL
MICRO REPORT STATUS: ABNORMAL
MICROORGANISM SPEC CULT: ABNORMAL
SPECIMEN SOURCE: ABNORMAL

## 2017-04-07 ENCOUNTER — PRE VISIT (OUTPATIENT)
Dept: UROLOGY | Facility: CLINIC | Age: 67
End: 2017-04-07

## 2017-04-07 NOTE — TELEPHONE ENCOUNTER
Patient coming in for mitrofanoff leaking. Patient has completed renal us and labs. Records/orders in [facility]. No need to call patient

## 2017-04-13 ENCOUNTER — OFFICE VISIT - HEALTHEAST (OUTPATIENT)
Dept: GERIATRICS | Facility: CLINIC | Age: 67
End: 2017-04-13

## 2017-04-13 DIAGNOSIS — G35 MULTIPLE SCLEROSIS (H): ICD-10-CM

## 2017-04-13 DIAGNOSIS — F32.A DEPRESSION, UNSPECIFIED DEPRESSION TYPE: ICD-10-CM

## 2017-04-13 DIAGNOSIS — R09.89 PULMONARY CONGESTION: ICD-10-CM

## 2017-04-13 DIAGNOSIS — G89.4 CHRONIC PAIN SYNDROME: ICD-10-CM

## 2017-04-13 DIAGNOSIS — N31.9 NEUROGENIC BLADDER: ICD-10-CM

## 2017-04-13 DIAGNOSIS — Z87.440 HISTORY OF RECURRENT UTIS: ICD-10-CM

## 2017-04-13 DIAGNOSIS — M62.838 MUSCLE SPASMS OF BOTH LOWER EXTREMITIES: ICD-10-CM

## 2017-04-13 ASSESSMENT — MIFFLIN-ST. JEOR: SCORE: 1148.19

## 2017-04-17 ENCOUNTER — OFFICE VISIT (OUTPATIENT)
Dept: UROLOGY | Facility: CLINIC | Age: 67
End: 2017-04-17

## 2017-04-17 VITALS
BODY MASS INDEX: 23.13 KG/M2 | HEART RATE: 66 BPM | DIASTOLIC BLOOD PRESSURE: 54 MMHG | WEIGHT: 139 LBS | SYSTOLIC BLOOD PRESSURE: 122 MMHG

## 2017-04-17 DIAGNOSIS — N31.9 NEUROGENIC BLADDER: Primary | ICD-10-CM

## 2017-04-17 ASSESSMENT — PAIN SCALES - GENERAL: PAINLEVEL: NO PAIN (0)

## 2017-04-17 NOTE — PROGRESS NOTES
Urology follow-up    S:  S/P deflux per Ilene 12/13/16 and is doing well. She is essentially dry unless quite full. She is very happy.    She was treated by Dr. Hyde for her bilateral stone disease and is doing well.    O:  /54 (BP Location: Left arm, Cuff Size: Adult Large)  Pulse 66  Wt 63 kg (139 lb)  BMI 23.13 kg/m2  NAD  Soft, NT    A/P:  -Continue current CIC regimen  -Annual NGB f/u with renal us and lab  -She will call if incontinence worsens, at which point we can consider repeat Deflux, however, I hope this does not occur again for some time.    I spent 10 minutes with the patient discussing the above mentioned findings and reviewing the assessment and plan, greater than 50% of which was spent on counseling and coordination of care. All questions were answered to the patients satisfaction.    Greg Royal DO  Fellow/Instructor  Department of Urology

## 2017-04-17 NOTE — LETTER
4/17/2017       RE: Aga Fraga  [address]  [place] [address]     Dear Colleague,    Thank you for referring your patient, Aga Fraga, to the [facility] UROLOGY AND INST FOR PROSTATE AND UROLOGIC CANCERS at [facility]. Please see a copy of my visit note below.    Urology follow-up    S:  S/P deflux per Ilene 12/13/16 and is doing well. She is essentially dry unless quite full. She is very happy.    She was treated by Dr. Hyde for her bilateral stone disease and is doing well.    O:  /54 (BP Location: Left arm, Cuff Size: Adult Large)  Pulse 66  Wt 63 kg (139 lb)  BMI 23.13 kg/m2  NAD  Soft, NT    A/P:  -Continue current CIC regimen  -Annual NGB f/u with renal us and lab  -She will call if incontinence worsens, at which point we can consider repeat Deflux, however, I hope this does not occur again for some time.    I spent 10 minutes with the patient discussing the above mentioned findings and reviewing the assessment and plan, greater than 50% of which was spent on counseling and coordination of care. All questions were answered to the patients satisfaction.    Greg Royal DO  Fellow/Instructor  Department of Urology

## 2017-04-17 NOTE — MR AVS SNAPSHOT
After Visit Summary   4/17/2017    Aga Fraga    MRN: [id number]           Patient Information     Date Of Birth          1950        Visit Information        Provider Department      4/17/2017 8:00 AM Greg Royal DO [facility] Urology and [facility] for Prostate and Urologic Cancers        Care Instructions    Follow up in 1 year with Lynn LOBATO with a renal US and labs prior.       It was a pleasure meeting with you today.  Thank you for allowing me and my team the privilege of caring for you today.  YOU are the reason we are here, and I truly hope we provided you with the excellent service you deserve.  Please let us know if there is anything else we can do for you so that we can be sure you are leaving completely satisfied with your care experience.                Follow-ups after your visit        Your next 10 appointments already scheduled     May 10, 2017  1:30 PM CDT   (Arrive by 1:15 PM)   Return Visit with  Pmr Nurse   [facility] Physical Medicine and Rehabilitation ([facility])    [address] [place] [address] [phone]            Aug 22, 2017  2:00 PM CDT   (Arrive by 1:45 PM)   Return Visit with Darius Hyde MD   [facility] Urology and [facility] for Prostate and Urologic Cancers ([facility])    [address] [place] [address] [phone]              Who to contact     Please call your clinic at [phone] to:    Ask questions about your health    Make or cancel appointments    Discuss your medicines    Learn about your test results    Speak to your doctor   If you have compliments or concerns about an experience at your clinic, or if you wish to file a complaint, please contact [facility] Patient Relations at [phone] or email us at [email or web address]         Additional Information About Your Visit        Steve  Information     [employer] is an electronic gateway that provides easy, online access to your medical records. With [employer], you can request a clinic appointment, read your test results, renew a prescription or communicate with your care team.     To sign up for [employer] visit the website at [email or web address]   You will be asked to enter the access code listed below, as well as some personal information. Please follow the directions to create your username and password.     Your access code is: [email or web address]  Expires: 2017  9:13 AM     Your access code will  in 90 days. If you need help or a new code, please contact your [facility] or call [phone] for assistance.        Care EveryWhere ID     This is your Care EveryWhere ID. This could be used by other organizations to access your [place] medical records  [id number]        Your Vitals Were     Pulse BMI (Body Mass Index)                66 23.13 kg/m2           Blood Pressure from Last 3 Encounters:   17 122/54   17 134/63   17 121/50    Weight from Last 3 Encounters:   17 63 kg (139 lb)   17 63 kg (139 lb)   17 63.3 kg (139 lb 8 oz)              Today, you had the following     No orders found for display         Today's Medication Changes          These changes are accurate as of: 17  9:13 AM.  If you have any questions, ask your nurse or doctor.               These medicines have changed or have updated prescriptions.        Dose/Directions    baclofen 20 MG tablet   Commonly known as:  LIORESAL   This may have changed:    - when to take this  - additional instructions   Used for:  Multiple sclerosis (H), Muscle spasm        Dose:  10 mg   Take 0.5 tablets (10 mg) by mouth 4 times daily as needed for muscle spasms   Quantity:  120 tablet   Refills:  3                Primary Care Provider Office Phone # Fax #    Astrid Marroquin [phone]        [facility] AFTER HOURS [address] [place] [address]        Thank you!     Thank you for choosing [facility] UROLOGY AND [facility] FOR PROSTATE AND UROLOGIC CANCERS  for your care. Our goal is always to provide you with excellent care. Hearing back from our patients is one way we can continue to improve our services. Please take a few minutes to complete the written survey that you may receive in the mail after your visit with us. Thank you!             Your Updated Medication List - Protect others around you: Learn how to safely use, store and throw away your medicines at www.disposemymeds.org.          This list is accurate as of: 4/17/17  9:13 AM.  Always use your most recent med list.                   Brand Name Dispense Instructions for use    atenolol 25 MG tablet    TENORMIN    30 tablet    Take 1 tablet (25 mg) by mouth 2 times daily       baclofen 20 MG tablet    LIORESAL    120 tablet    Take 0.5 tablets (10 mg) by mouth 4 times daily as needed for muscle spasms       calcium carbonate 500 MG tablet    OS-EVELINE 500 mg [place]. [place]    90 tablet    Take 1 tablet (500 mg) by mouth daily       cholecalciferol 1000 UNIT tablet    vitamin D     Take 1,000 Units by mouth daily       collagenase ointment      Apply topically as needed (Wound care for pressure ulcers)       COPAXONE 40 MG/ML Sosy   Generic drug:  Glatiramer Acetate      Inject 1 mL Subcutaneous three times a week Every Tuesday, Thursday and Saturday       ferrous sulfate 325 (65 FE) MG tablet    IRON     Take 325 mg by mouth daily (with breakfast)       fluticasone 50 MCG/ACT spray    FLONASE     Spray 1 spray into both nostrils daily as needed for rhinitis or allergies       gabapentin 100 MG capsule    NEURONTIN    90 capsule    Take 1-2 capsules (100-200 mg) by mouth 3 times daily Take 100 mg (1 capsule) by mouth in the morning, 100 mg at noon, 100 mg in the afternoon, and 200 mg (2 capsules) at bedtimeSee Admin Instructions Take 100 mg (1  capsule) by mouth in the morning, 100 mg at noon, 100 mg in the afternoon, and 200 mg (2 capsules) at bedtime       methyl salicylate-menthol Oint     1 Tube    Apply 2 g topically every 6 hours as needed (pain).       multivitamin, therapeutic with minerals Tabs tablet     30 each    Take 1 tablet by mouth daily       nystatin-triamcinolone cream    MYCOLOG II     Apply topically 2 times daily as needed       OMEGA-3 PO      Take 300-1,000 mg by mouth daily       OMEPRAZOLE PO      Take 20 mg by mouth 2 times daily (before meals)       oxyCODONE 5 MG IR tablet    ROXICODONE    30 tablet    Take 1 tablet (5 mg) by mouth every 4 hours as needed for moderate to severe pain       PARoxetine 25 MG 24 hr tablet    PAXIL-CR    30 tablet    Take 1 tablet (25 mg) by mouth every morning       PROSTAT PO      Take by mouth 2 times daily 15gm/1oz       traMADol 50 MG tablet    ULTRAM    28 tablet    Take 1 tablet (50 mg) by mouth every 6 hours as needed       TYLENOL 325 MG tablet   Generic drug:  acetaminophen      Take 650 mg by mouth every 4 hours as needed for mild pain

## 2017-04-17 NOTE — PATIENT INSTRUCTIONS
Follow up in 1 year with Lynn LOBATO with a renal US and labs prior.       It was a pleasure meeting with you today.  Thank you for allowing me and my team the privilege of caring for you today.  YOU are the reason we are here, and I truly hope we provided you with the excellent service you deserve.  Please let us know if there is anything else we can do for you so that we can be sure you are leaving completely satisfied with your care experience.

## 2017-05-02 ENCOUNTER — AMBULATORY - HEALTHEAST (OUTPATIENT)
Dept: GERIATRICS | Facility: CLINIC | Age: 67
End: 2017-05-02

## 2017-05-04 ENCOUNTER — OFFICE VISIT - HEALTHEAST (OUTPATIENT)
Dept: GERIATRICS | Facility: CLINIC | Age: 67
End: 2017-05-04

## 2017-05-04 DIAGNOSIS — M62.838 MUSCLE SPASMS OF BOTH LOWER EXTREMITIES: ICD-10-CM

## 2017-05-04 DIAGNOSIS — N31.9 NEUROGENIC BLADDER: ICD-10-CM

## 2017-05-04 DIAGNOSIS — N39.0 URINARY TRACT INFECTION ASSOCIATED WITH CYSTOSTOMY CATHETER, INITIAL ENCOUNTER (H): ICD-10-CM

## 2017-05-04 DIAGNOSIS — F32.A DEPRESSION, UNSPECIFIED DEPRESSION TYPE: ICD-10-CM

## 2017-05-04 DIAGNOSIS — G35 MULTIPLE SCLEROSIS (H): ICD-10-CM

## 2017-05-04 DIAGNOSIS — T83.510A URINARY TRACT INFECTION ASSOCIATED WITH CYSTOSTOMY CATHETER, INITIAL ENCOUNTER (H): ICD-10-CM

## 2017-05-04 DIAGNOSIS — G89.4 CHRONIC PAIN SYNDROME: ICD-10-CM

## 2017-05-04 DIAGNOSIS — J40 BRONCHITIS: ICD-10-CM

## 2017-05-04 ASSESSMENT — MIFFLIN-ST. JEOR: SCORE: 1141.84

## 2017-05-10 ENCOUNTER — ALLIED HEALTH/NURSE VISIT (OUTPATIENT)
Dept: PHYSICAL MEDICINE AND REHAB | Facility: CLINIC | Age: 67
End: 2017-05-10

## 2017-05-10 DIAGNOSIS — M62.838 SPASM OF MUSCLE: ICD-10-CM

## 2017-05-10 DIAGNOSIS — Z97.8 PRESENCE OF INTRATHECAL BACLOFEN PUMP: Primary | ICD-10-CM

## 2017-05-10 NOTE — MR AVS SNAPSHOT
After Visit Summary   5/10/2017    Aga Fraga    MRN: [id number]           Patient Information     Date Of Birth          1950        Visit Information        Provider Department      5/10/2017 1:30 PM Nurse, Malcolm Ruby [facility]         Follow-ups after your visit        Your next 10 appointments already scheduled     Jun 21, 2017  1:00 PM CDT   (Arrive by 12:45 PM)   Return Visit with Malcolm Ruby Nurse   [facility] ([facility])    [address] [place] [address]  3rd [facility] [id number] [phone]            Aug 22, 2017  2:00 PM CDT   (Arrive by 1:45 PM)   Return Visit with Darius Hyde MD   [facility] ([facility])    [address] [place] [address]  4th [facility] [id number] [phone]              Who to contact     Please call your clinic at [phone] to:    Ask questions about your health    Make or cancel appointments    Discuss your medicines    Learn about your test results    Speak to your doctor   If you have compliments or concerns about an experience at your clinic, or if you wish to file a complaint, please contact [facility] Patient Relations at [phone] or email us at [email or web address]         Additional Information About Your Visit        MyChart Information     Zapat is an electronic gateway that provides easy, online access to your medical records. With CodeEval, you can request a clinic appointment, read your test results, renew a prescription or communicate with your care team.     To sign up for Zapat visit the website at [email or web address]   You will be asked to enter the access code listed below, as well as some personal information. Please follow the directions to create your username and password.     Your access code is:  [email or web address]  Expires: 2017  9:13 AM     Your access code will  in 90 days. If you need help or a new code, please contact your [facility] or call [phone] for assistance.        Care EveryWhere ID     This is your Care EveryWhere ID. This could be used by other organizations to access your [facility] medical records  [id number]         Blood Pressure from Last 3 Encounters:   17 122/54   17 134/63   17 121/50    Weight from Last 3 Encounters:   17 63 kg (139 lb)   17 63 kg (139 lb)   17 63.3 kg (139 lb 8 oz)              Today, you had the following     No orders found for display         Today's Medication Changes          These changes are accurate as of: 5/10/17  3:01 PM.  If you have any questions, ask your nurse or doctor.               These medicines have changed or have updated prescriptions.        Dose/Directions    baclofen 20 MG tablet   Commonly known as:  LIORESAL   This may have changed:    - when to take this  - additional instructions   Used for:  Multiple sclerosis (H), Muscle spasm        Dose:  10 mg   Take 0.5 tablets (10 mg) by mouth 4 times daily as needed for muscle spasms   Quantity:  120 tablet   Refills:  3                Primary Care Provider Office Phone # Fax #    Astrid RUTH Navingrant [phone]       [facility] AFTER HOURS [address] [place] [address]        Thank you!     Thank you for choosing [facility]  for your care. Our goal is always to provide you with excellent care. Hearing back from our patients is one way we can continue to improve our services. Please take a few minutes to complete the written survey that you may receive in the mail after your visit with us. Thank you!             Your Updated Medication List - Protect others around you: Learn how to safely use, store and throw away your medicines at www.disposemymeds.org.           This list is accurate as of: 5/10/17  3:01 PM.  Always use your most recent med list.                   Brand Name Dispense Instructions for use    atenolol 25 MG tablet    TENORMIN    30 tablet    Take 1 tablet (25 mg) by mouth 2 times daily       baclofen 20 MG tablet    LIORESAL    120 tablet    Take 0.5 tablets (10 mg) by mouth 4 times daily as needed for muscle spasms       calcium carbonate 500 MG tablet    OS-EVELINE 500 mg [place]. Ca    90 tablet    Take 1 tablet (500 mg) by mouth daily       cholecalciferol 1000 UNIT tablet    vitamin D     Take 1,000 Units by mouth daily       collagenase ointment      Apply topically as needed (Wound care for pressure ulcers)       COPAXONE 40 MG/ML Sosy   Generic drug:  Glatiramer Acetate      Inject 1 mL Subcutaneous three times a week Every Tuesday, Thursday and Saturday       ferrous sulfate 325 (65 FE) MG tablet    IRON     Take 325 mg by mouth daily (with breakfast)       fluticasone 50 MCG/ACT spray    FLONASE     Spray 1 spray into both nostrils daily as needed for rhinitis or allergies       gabapentin 100 MG capsule    NEURONTIN    90 capsule    Take 1-2 capsules (100-200 mg) by mouth 3 times daily Take 100 mg (1 capsule) by mouth in the morning, 100 mg at noon, 100 mg in the afternoon, and 200 mg (2 capsules) at bedtimeSee Admin Instructions Take 100 mg (1 capsule) by mouth in the morning, 100 mg at noon, 100 mg in the afternoon, and 200 mg (2 capsules) at bedtime       methyl salicylate-menthol Oint     1 Tube    Apply 2 g topically every 6 hours as needed (pain).       multivitamin, therapeutic with minerals Tabs tablet     30 each    Take 1 tablet by mouth daily       nystatin-triamcinolone cream    MYCOLOG II     Apply topically 2 times daily as needed       OMEGA-3 PO      Take 300-1,000 mg by mouth daily       OMEPRAZOLE PO      Take 20 mg by mouth 2 times daily (before meals)       oxyCODONE 5 MG IR tablet    ROXICODONE    30 tablet    Take 1 tablet (5 mg)  by mouth every 4 hours as needed for moderate to severe pain       PARoxetine 25 MG 24 hr tablet    PAXIL-CR    30 tablet    Take 1 tablet (25 mg) by mouth every morning       PROSTAT PO      Take by mouth 2 times daily 15gm/1oz       traMADol 50 MG tablet    ULTRAM    28 tablet    Take 1 tablet (50 mg) by mouth every 6 hours as needed       TYLENOL 325 MG tablet   Generic drug:  acetaminophen      Take 650 mg by mouth every 4 hours as needed for mild pain

## 2017-05-11 NOTE — PROGRESS NOTES
Intrathecal Pump Clinic Note    Aga Fraga is being seen in the Intrathecal Pump Clinic for a pump refill for Spasticity secondary to MS.      VERIFICATION OF PATIENT IDENTIFICATION AND PROCEDURE     Initials   Patient Name pag   Patient  pag   Procedure Verified by: pag     Prior to the start of the procedure and with procedural staff participation, I verbally confirmed the patient s identity using two indicators, relevant allergies, that the procedure was appropriate and matched the consent or emergent situation, and that the correct equipment/implants were available. Immediately prior to starting the procedure I conducted the Time Out with the procedural staff and re-confirmed the patient s name, procedure, and site/side. (The Joint Commission universal protocol was followed.)  Yes      INTERIM HISTORY:  Aga has been doing well since our last visit.    INTERIM PAST MEDICAL HISTORY:  There have been other physician visits since our last appointment.  She has been seen by Urology for a UTI and a recheck. Patient was assisted to self catheterize at end of clinic visit, and 300mL of clear urine obtained.    She IS NOT currently involved in therapy.  She is also working on ADL's for a home program, still living at [facility]  She has made functional gains in .    REVIEW OF SYSTEMS:  She has pain managed her baclofen pump and oral meds.  She has difficulty with bladder issues. See above.  She denies having issues with her bowels.  Patient denies side effects from the intrathecal Baclofen.    Current Outpatient Prescriptions   Medication Sig Dispense Refill     oxyCODONE (ROXICODONE) 5 MG IR tablet Take 1 tablet (5 mg) by mouth every 4 hours as needed for moderate to severe pain 30 tablet 0     cholecalciferol (VITAMIN D3) 1000 UNIT tablet Take 1,000 Units by mouth daily       Glatiramer Acetate (COPAXONE) 40 MG/ML SOSY Inject 1 mL Subcutaneous three times a week Every Tuesday, Thursday and  Saturday       fluticasone (FLONASE) 50 MCG/ACT spray Spray 1 spray into both nostrils daily as needed for rhinitis or allergies       nystatin-triamcinolone (MYCOLOG II) cream Apply topically 2 times daily as needed       Omega-3 Fatty Acids (OMEGA-3 PO) Take 300-1,000 mg by mouth daily       Pollen Extracts (PROSTAT PO) Take by mouth 2 times daily 15gm/1oz       collagenase ointment Apply topically as needed (Wound care for pressure ulcers)       acetaminophen (TYLENOL) 325 MG tablet Take 650 mg by mouth every 4 hours as needed for mild pain       OMEPRAZOLE PO Take 20 mg by mouth 2 times daily (before meals)        ferrous sulfate (IRON) 325 (65 FE) MG tablet Take 325 mg by mouth daily (with breakfast)       traMADol (ULTRAM) 50 MG tablet Take 1 tablet (50 mg) by mouth every 6 hours as needed 28 tablet      gabapentin (NEURONTIN) 100 MG capsule Take 1-2 capsules (100-200 mg) by mouth 3 times daily Take 100 mg (1 capsule) by mouth in the morning, 100 mg at noon, 100 mg in the afternoon, and 200 mg (2 capsules) at bedtimeSee Admin Instructions Take 100 mg (1 capsule) by mouth in the morning, 100 mg at noon, 100 mg in the afternoon, and 200 mg (2 capsules) at bedtime 90 capsule 0     PARoxetine (PAXIL-CR) 25 MG 24 hr tablet Take 1 tablet (25 mg) by mouth every morning 30 tablet      atenolol (TENORMIN) 25 MG tablet Take 1 tablet (25 mg) by mouth 2 times daily 30 tablet      calcium carbonate (OS-EVELINE 500 MG [place]. CA) 500 MG tablet Take 1 tablet (500 mg) by mouth daily 90 tablet      multivitamin, therapeutic with minerals (THERA-VIT-M) TABS Take 1 tablet by mouth daily 30 each 0     baclofen (LIORESAL) 20 MG tablet Take 0.5 tablets (10 mg) by mouth 4 times daily as needed for muscle spasms (Patient taking differently: Take 10 mg by mouth 2 times daily Taken Every AM and PM. Also given up to three times throughout the day PRN) 120 tablet 3     methyl salicylate-menthol (BENGAY) OINT Apply 2 g topically every 6 hours  as needed (pain). 1 Tube 2       INTERIM SOCIAL HISTORY:  Social situation has not changed since the last visit.  She is unemployed.  She is living in a nursing home, as noted above.    PHYSICAL EXAMINATION:  Interrogation of the Baclofen  pump shows that it is currently running at a Simple Continuous dose of 27.0 mcg/day. She also has a Flex (complex continuous) dose of 35.0 mcg/hour over 5 hours; 35.4 mcg/hour over 5 hours for a total dose of 730.4 mcg/day.  Pump site is intact with no signs of infection, redness, swelling or seroma.    Clonus is not present.    Musculoskeletal contractures are present in the knees and ankles. She has bilateral knee flexion contractures with bilateral plantar flexion contractures, and her right ankle has an inversion flexion contracture.    Gait examination showed non-ambulatory.    Upper extremity motor control:  Unchanged from previous exam.    Lower extremity motor control/gait:  Unchanged from previous exam.    Oral motor control/speech:  Is optimized    Pain:  Is under adequate control.    Psychosocial:  Status is unchanged from the previous visit..    IMPRESSION/PLAN:    Visit Reason: Pump Refill  NDC #: 2000 (54425-214-60)  Sterile Prep & Drape: Yes  Mililiters Withdrawn: 5  Mililiters Expected: 4.7  Previous Dose: 27.0 simple continuous, with flex dosing to equal 730.4 mcg/day  New Dose: 27.0 simple continuous, with flex dosing to equal 730.4 mcg/day  Alarm Date: 06/28/17  Spasticity: Optimal  Therapeutic Level: Optimal  LES: 44 months    Next pump refill appointment: June 21, 2017 at 1:00PM    KASSY Bergman, Care Coordinator  Under the supervision of  Aris Harris MD  Department of Physical Medicine and Rehabilitation

## 2017-05-16 ENCOUNTER — AMBULATORY - HEALTHEAST (OUTPATIENT)
Dept: GERIATRICS | Facility: CLINIC | Age: 67
End: 2017-05-16

## 2017-06-21 ENCOUNTER — ALLIED HEALTH/NURSE VISIT (OUTPATIENT)
Dept: PHYSICAL MEDICINE AND REHAB | Facility: CLINIC | Age: 67
End: 2017-06-21

## 2017-06-21 DIAGNOSIS — Z95.828 PRESENCE OF IMPLANTED INFUSION PUMP: ICD-10-CM

## 2017-06-21 DIAGNOSIS — M62.838 SPASM OF MUSCLE: ICD-10-CM

## 2017-06-21 DIAGNOSIS — G35 MULTIPLE SCLEROSIS (H): Primary | ICD-10-CM

## 2017-06-21 NOTE — PROGRESS NOTES
Intrathecal Pump Clinic Note    Aga Fraga is being seen in the Intrathecal Baclofen Pump Clinic for a pump refill for Spasticity secondary to MS.      VERIFICATION OF PATIENT IDENTIFICATION AND PROCEDURE     Initials   Patient Name lmd   Patient  lmd   Procedure Verified by: lmd     Prior to the start of the procedure and with procedural staff participation, I verbally confirmed the patient s identity using two indicators, relevant allergies, that the procedure was appropriate and matched the consent or emergent situation, and that the correct equipment/implants were available. Immediately prior to starting the procedure I conducted the Time Out with the procedural staff and re-confirmed the patient s name, procedure, and site/side. (The Joint Commission universal protocol was followed.)  Yes      INTERIM HISTORY:  Aga has not been doing well since our last visit.  Pt states she's not feeling well today.  Pale and more lethargic today.  Head much more tilted to right and appears very tired.  Very congested with productive cough and c/o substernal discomfort when coughing.  Temp 98.1.      INTERIM PAST MEDICAL HISTORY:  There have not been other physician visits since our last appointment.  She IS NOT currently involved in therapy.  She is also working on ADL's for a home program.  She has made functional gains in .    REVIEW OF SYSTEMS:  She has pain managed her baclofen pump and oral meds.  She has difficulty with bladder issues.  Mitrofanoff site leaking today.  Padding reinforced.  She denies having issues with her bowels.  Patient denies side effects from the intrathecal Baclofen.    Current Outpatient Prescriptions   Medication Sig Dispense Refill     oxyCODONE (ROXICODONE) 5 MG IR tablet Take 1 tablet (5 mg) by mouth every 4 hours as needed for moderate to severe pain 30 tablet 0     cholecalciferol (VITAMIN D3) 1000 UNIT tablet Take 1,000 Units by mouth daily       Glatiramer Acetate  (COPAXONE) 40 MG/ML SOSY Inject 1 mL Subcutaneous three times a week Every Tuesday, Thursday and Saturday       fluticasone (FLONASE) 50 MCG/ACT spray Spray 1 spray into both nostrils daily as needed for rhinitis or allergies       nystatin-triamcinolone (MYCOLOG II) cream Apply topically 2 times daily as needed       Omega-3 Fatty Acids (OMEGA-3 PO) Take 300-1,000 mg by mouth daily       Pollen Extracts (PROSTAT PO) Take by mouth 2 times daily 15gm/1oz       collagenase ointment Apply topically as needed (Wound care for pressure ulcers)       acetaminophen (TYLENOL) 325 MG tablet Take 650 mg by mouth every 4 hours as needed for mild pain       OMEPRAZOLE PO Take 20 mg by mouth 2 times daily (before meals)        ferrous sulfate (IRON) 325 (65 FE) MG tablet Take 325 mg by mouth daily (with breakfast)       traMADol (ULTRAM) 50 MG tablet Take 1 tablet (50 mg) by mouth every 6 hours as needed 28 tablet      gabapentin (NEURONTIN) 100 MG capsule Take 1-2 capsules (100-200 mg) by mouth 3 times daily Take 100 mg (1 capsule) by mouth in the morning, 100 mg at noon, 100 mg in the afternoon, and 200 mg (2 capsules) at bedtimeSee Admin Instructions Take 100 mg (1 capsule) by mouth in the morning, 100 mg at noon, 100 mg in the afternoon, and 200 mg (2 capsules) at bedtime 90 capsule 0     PARoxetine (PAXIL-CR) 25 MG 24 hr tablet Take 1 tablet (25 mg) by mouth every morning 30 tablet      atenolol (TENORMIN) 25 MG tablet Take 1 tablet (25 mg) by mouth 2 times daily 30 tablet      calcium carbonate (OS-EVELINE 500 MG [place]. CA) 500 MG tablet Take 1 tablet (500 mg) by mouth daily 90 tablet      multivitamin, therapeutic with minerals (THERA-VIT-M) TABS Take 1 tablet by mouth daily 30 each 0     baclofen (LIORESAL) 20 MG tablet Take 0.5 tablets (10 mg) by mouth 4 times daily as needed for muscle spasms (Patient taking differently: Take 10 mg by mouth 2 times daily Taken Every AM and PM. Also given up to three times throughout the day  PRN) 120 tablet 3     methyl salicylate-menthol (BENGAY) OINT Apply 2 g topically every 6 hours as needed (pain). 1 Tube 2       INTERIM SOCIAL HISTORY:  Social situation has not changed since the last visit.  She is unemployed.  She is living in a nursing home [facility].    PHYSICAL EXAMINATION:  Interrogation of the Baclofen  pump shows that it is currently running at a Simple Continuous dose of 27.0 mcg/day. She also has a Flex (complex continuous) dose of 35.0 mcg/hour over 5 hours; 35.4 mcg/hour over 5 hours for total dose of 730.4 mcg/day.  Pump site is intact with no signs of infection, redness, swelling or seroma.      Clonus is not present.      Musculoskeletal contractures are present in the knees and ankles. She has bilateral knee flexion contractures with bilateral plantar flexion contractures, and her right ankle has an inversion flexion contracture.      Gait examination showed non-ambulatory.      Upper extremity motor control:  Unchanged from previous exam.      Lower extremity motor control/gait:  Unchanged from previous exam.      Oral motor control/speech:  Is optimized      Pain:  Is under adequate control.      Psychosocial:  More lethargic today than usual.      IMPRESSION/PLAN:  Visit Reason: Pump Refill  Refill Amount: 20 mL  NDC #: 2000 ([id number])  Sterile Prep & Drape: Yes  Mililiters Withdrawn: 4.7  Mililiters Expected: 4.7  New Dose: 27.0 mcg/day, with flex dosing same as above  Alarm Date: 8/9/2017  Replacement Interval: 43 months  Spasticity: Optimal  Therapeutic Level: Optimal     Next pump refill appointment 8/7//2017    I called [facility] and spoke to Lynn, Pt's caregiver, to report Pt's congestion, lethargy and substernal discomfort.  She said Pt started feeling unwell this morning but temp was normal.  She will monitor Pt closely when she returns from our visit and report worsening status to nursing staff.      Niru Yun RN  Under the  supervision of  Gigi Bassett MD  Department of Physical Medicine and Rehabilitation

## 2017-06-21 NOTE — MR AVS SNAPSHOT
After Visit Summary   6/21/2017    Aga Fraga    MRN: [id number]           Patient Information     Date Of Birth          1950        Visit Information        Provider Department      6/21/2017 1:00 PM Nurse, Malcolm Ruby [facility] Physical Medicine and Rehabilitation        Today's Diagnoses     Multiple sclerosis (H)    -  1    Spasm of muscle        Presence of implanted infusion pump           Follow-ups after your visit        Follow-up notes from your care team     Return 8/7/2017, for Baclofen Pump refill.      Your next 10 appointments already scheduled     Aug 07, 2017  1:00 PM CDT   (Arrive by 12:45 PM)   Return Visit with  Pmr Nurse   [facility] Physical Medicine and Rehabilitation ([facility])    [address]  3rd [facility] [address] [phone]            Aug 22, 2017  2:00 PM CDT   (Arrive by 1:45 PM)   Return Visit with Darius Hyde MD   [facility] Urology and [facility]rologic [facility] ([facility])    [address] [place] [address]  4th [facility] [address] [phone]              Who to contact     Please call your clinic at [phone] to:    Ask questions about your health    Make or cancel appointments    Discuss your medicines    Learn about your test results    Speak to your doctor   If you have compliments or concerns about an experience at your clinic, or if you wish to file a complaint, please contact [facility] Patient Relations at [phone] or email us at [email or web address]         Additional Information About Your Visit        MyChart Information     Tedcas is an electronic gateway that provides easy, online access to your medical records. With Tedcas, you can request a clinic appointment, read your test results, renew a prescription or communicate with your care team.     To sign up for Tedcas visit the website at  [email or web address]   You will be asked to enter the access code listed below, as well as some personal information. Please follow the directions to create your username and password.     Your access code is: [email or web address]  Expires: 2017  9:13 AM     Your access code will  in 90 days. If you need help or a new code, please contact your [facility] or call [phone] for assistance.        Care EveryWhere ID     This is your Care EveryWhere ID. This could be used by other organizations to access your [place] medical records  [id number]         Blood Pressure from Last 3 Encounters:   17 122/54   17 134/63   17 121/50    Weight from Last 3 Encounters:   17 63 kg (139 lb)   17 63 kg (139 lb)   17 63.3 kg (139 lb 8 oz)              Today, you had the following     No orders found for display         Today's Medication Changes          These changes are accurate as of: 17  3:51 PM.  If you have any questions, ask your nurse or doctor.               These medicines have changed or have updated prescriptions.        Dose/Directions    baclofen 20 MG tablet   Commonly known as:  LIORESAL   This may have changed:    - when to take this  - additional instructions   Used for:  Multiple sclerosis (H), Muscle spasm        Dose:  10 mg   Take 0.5 tablets (10 mg) by mouth 4 times daily as needed for muscle spasms   Quantity:  120 tablet   Refills:  3                Primary Care Provider Office Phone # Fax #    Astrid FLORY Marroquin [phone]       [facility] AFTER HOURS [address] [place] [address]        Equal Access to Services     ABBEY ACE : Hadjesse dinero Soyuli, waaxda luqadaha, qaybta kaalmacarmen garvey. So [facility] [phone].    ATENCIÓN: Si habla español, tiene a urena disposición servicios gratuitos de asistencia lingüística. Llame al [phone].    We  comply with applicable federal civil rights laws and [place] laws. We do not discriminate on the basis of race, color, national origin, age, disability sex, sexual orientation or gender identity.            Thank you!     Thank you for choosing [facility] PHYSICAL MEDICINE AND REHABILITATION  for your care. Our goal is always to provide you with excellent care. Hearing back from our patients is one way we can continue to improve our services. Please take a few minutes to complete the written survey that you may receive in the mail after your visit with us. Thank you!             Your Updated Medication List - Protect others around you: Learn how to safely use, store and throw away your medicines at www.disposemymeds.org.          This list is accurate as of: 6/21/17  3:51 PM.  Always use your most recent med list.                   Brand Name Dispense Instructions for use Diagnosis    atenolol 25 MG tablet    TENORMIN    30 tablet    Take 1 tablet (25 mg) by mouth 2 times daily    HOCM (hypertrophic obstructive cardiomyopathy) (H)       baclofen 20 MG tablet    LIORESAL    120 tablet    Take 0.5 tablets (10 mg) by mouth 4 times daily as needed for muscle spasms    Multiple sclerosis (H), Muscle spasm       calcium carbonate 1250 MG tablet    OS-EVELINE 500 mg [place]. Ca    90 tablet    Take 1 tablet (500 mg) by mouth daily    MS (multiple sclerosis) (H)       cholecalciferol 1000 UNIT tablet    vitamin D     Take 1,000 Units by mouth daily        collagenase ointment      Apply topically as needed (Wound care for pressure ulcers)        COPAXONE 40 MG/ML Sosy   Generic drug:  Glatiramer Acetate      Inject 1 mL Subcutaneous three times a week Every Tuesday, Thursday and Saturday        ferrous sulfate 325 (65 FE) MG tablet    IRON     Take 325 mg by mouth daily (with breakfast)        fluticasone 50 MCG/ACT spray    FLONASE     Spray 1 spray into both nostrils daily as needed for rhinitis or allergies        gabapentin  100 MG capsule    NEURONTIN    90 capsule    Take 1-2 capsules (100-200 mg) by mouth 3 times daily Take 100 mg (1 capsule) by mouth in the morning, 100 mg at noon, 100 mg in the afternoon, and 200 mg (2 capsules) at bedtimeSee Admin Instructions Take 100 mg (1 capsule) by mouth in the morning, 100 mg at noon, 100 mg in the afternoon, and 200 mg (2 capsules) at bedtime    Spasm of muscle       methyl salicylate-menthol Oint     1 Tube    Apply 2 g topically every 6 hours as needed (pain).    Multiple scleroses       multivitamin, therapeutic with minerals Tabs tablet     30 each    Take 1 tablet by mouth daily    MS (multiple sclerosis) (H)       nystatin-triamcinolone cream    MYCOLOG II     Apply topically 2 times daily as needed        OMEGA-3 PO      Take 300-1,000 mg by mouth daily        OMEPRAZOLE PO      Take 20 mg by mouth 2 times daily (before meals)        oxyCODONE 5 MG IR tablet    ROXICODONE    30 tablet    Take 1 tablet (5 mg) by mouth every 4 hours as needed for moderate to severe pain    Kidney stones       PARoxetine 25 MG 24 hr tablet    PAXIL-CR    30 tablet    Take 1 tablet (25 mg) by mouth every morning    Episode of recurrent major depressive disorder, unspecified depression episode severity (H)       PROSTAT PO      Take by mouth 2 times daily 15gm/1oz        traMADol 50 MG tablet    ULTRAM    28 tablet    Take 1 tablet (50 mg) by mouth every 6 hours as needed    Spasm of muscle       TYLENOL 325 MG tablet   Generic drug:  acetaminophen      Take 650 mg by mouth every 4 hours as needed for mild pain

## 2017-06-23 ENCOUNTER — AMBULATORY - HEALTHEAST (OUTPATIENT)
Dept: GERIATRICS | Facility: CLINIC | Age: 67
End: 2017-06-23

## 2017-07-12 ENCOUNTER — OFFICE VISIT - HEALTHEAST (OUTPATIENT)
Dept: GERIATRICS | Facility: CLINIC | Age: 67
End: 2017-07-12

## 2017-07-12 DIAGNOSIS — I42.9 CARDIOMYOPATHY (H): ICD-10-CM

## 2017-07-12 DIAGNOSIS — G35 MS (MULTIPLE SCLEROSIS) (H): ICD-10-CM

## 2017-07-12 DIAGNOSIS — J18.9 PNEUMONIA: ICD-10-CM

## 2017-07-12 DIAGNOSIS — J96.10 CHRONIC RESPIRATORY FAILURE (H): ICD-10-CM

## 2017-07-12 DIAGNOSIS — N31.9 NEUROGENIC BLADDER: ICD-10-CM

## 2017-07-14 ENCOUNTER — AMBULATORY - HEALTHEAST (OUTPATIENT)
Dept: GERIATRICS | Facility: CLINIC | Age: 67
End: 2017-07-14

## 2017-08-08 ENCOUNTER — ALLIED HEALTH/NURSE VISIT (OUTPATIENT)
Dept: PHYSICAL MEDICINE AND REHAB | Facility: CLINIC | Age: 67
End: 2017-08-08

## 2017-08-08 DIAGNOSIS — M62.838 SPASM OF MUSCLE: ICD-10-CM

## 2017-08-08 DIAGNOSIS — Z95.828 PRESENCE OF IMPLANTED INFUSION PUMP: ICD-10-CM

## 2017-08-08 DIAGNOSIS — G35 MULTIPLE SCLEROSIS (H): Primary | ICD-10-CM

## 2017-08-08 NOTE — MR AVS SNAPSHOT
After Visit Summary   8/8/2017    Aga Fraga    MRN: [id number]           Patient Information     Date Of Birth          1950        Visit Information        Provider Department      8/8/2017 1:00 PM Nurse, Malcolm [facility] Physical Medicine and Rehabilitation        Today's Diagnoses     Multiple sclerosis (H)    -  1    Spasm of muscle        Presence of implanted infusion pump           Follow-ups after your visit        Follow-up notes from your care team     Return in about 6 weeks (around 9/21/2017) for Baclofen Pump refill.      Your next 10 appointments already scheduled     Aug 22, 2017  2:00 PM CDT   (Arrive by 1:45 PM)   Return Visit with Darius Hyde MD   [facility] and [facility] Prostate and Urologic Cancers ([facility])    [address] [place] [address]  4th [facility] [address] [phone]            Sep 21, 2017  1:00 PM CDT   (Arrive by 12:45 PM)   Return Visit with Malcolm Pmr Nurse   [facility] Physical Medicine and Rehabilitation ([facility])    [address] [place] [address]  3rd [facility] [address] [phone]              Who to contact     Please call your clinic at [phone] to:    Ask questions about your health    Make or cancel appointments    Discuss your medicines    Learn about your test results    Speak to your doctor   If you have compliments or concerns about an experience at your clinic, or if you wish to file a complaint, please contact [facility]icians Patient Relations at [phone] or email us at [email or web address]         Additional Information About Your Visit        MyChart Information     Preventsys is an electronic gateway that provides easy, online access to your medical records. With Preventsys, you can request a clinic appointment, read your test results, renew a prescription or communicate with your care team.     To sign up for Preventsys visit the  website at [email or web address]   You will be asked to enter the access code listed below, as well as some personal information. Please follow the directions to create your username and password.     Your access code is: [email or web address]  Expires: 10/22/2017  6:30 AM     Your access code will  in 90 days. If you need help or a new code, please contact your [facility] or call [phone] for assistance.        Care EveryWhere ID     This is your Care EveryWhere ID. This could be used by other organizations to access your [place] medical records  [id number]         Blood Pressure from Last 3 Encounters:   17 122/54   17 134/63   17 121/50    Weight from Last 3 Encounters:   17 63 kg (139 lb)   17 63 kg (139 lb)   17 63.3 kg (139 lb 8 oz)              Today, you had the following     No orders found for display         Today's Medication Changes          These changes are accurate as of: 17  5:21 PM.  If you have any questions, ask your nurse or doctor.               These medicines have changed or have updated prescriptions.        Dose/Directions    baclofen 20 MG tablet   Commonly known as:  LIORESAL   This may have changed:    - when to take this  - additional instructions   Used for:  Multiple sclerosis (H), Muscle spasm        Dose:  10 mg   Take 0.5 tablets (10 mg) by mouth 4 times daily as needed for muscle spasms   Quantity:  120 tablet   Refills:  3                Primary Care Provider Office Phone # Fax #    Astrid RUTH Cara [phone]       [facility] AFTER HOURS [address] [place] [address]        Equal Access to Services     [facility] HospitalJAMES : Hadii rad Cardona, waabelda kyle, qacarmen sims. So [facility] [phone].    ATENCIÓN: Si habla español, tiene a urena disposición servicios gratuitos de asistencia lingüística. Llame al  [phone].    We comply with applicable federal civil rights laws and [place] laws. We do not discriminate on the basis of race, color, national origin, age, disability sex, sexual orientation or gender identity.            Thank you!     Thank you for choosing [facility] PHYSICAL MEDICINE AND REHABILITATION  for your care. Our goal is always to provide you with excellent care. Hearing back from our patients is one way we can continue to improve our services. Please take a few minutes to complete the written survey that you may receive in the mail after your visit with us. Thank you!             Your Updated Medication List - Protect others around you: Learn how to safely use, store and throw away your medicines at www.disposemymeds.org.          This list is accurate as of: 8/8/17  5:21 PM.  Always use your most recent med list.                   Brand Name Dispense Instructions for use Diagnosis    atenolol 25 MG tablet    TENORMIN    30 tablet    Take 1 tablet (25 mg) by mouth 2 times daily    HOCM (hypertrophic obstructive cardiomyopathy) (H)       baclofen 20 MG tablet    LIORESAL    120 tablet    Take 0.5 tablets (10 mg) by mouth 4 times daily as needed for muscle spasms    Multiple sclerosis (H), Muscle spasm       calcium carbonate 1250 MG tablet    OS-EVELINE 500 mg [place]. Ca    90 tablet    Take 1 tablet (500 mg) by mouth daily    MS (multiple sclerosis) (H)       cholecalciferol 1000 UNIT tablet    vitamin D     Take 1,000 Units by mouth daily        collagenase ointment      Apply topically as needed (Wound care for pressure ulcers)        COPAXONE 40 MG/ML Sosy   Generic drug:  Glatiramer Acetate      Inject 1 mL Subcutaneous three times a week Every Tuesday, Thursday and Saturday        ferrous sulfate 325 (65 FE) MG tablet    IRON     Take 325 mg by mouth daily (with breakfast)        fluticasone 50 MCG/ACT spray    FLONASE     Spray 1 spray into both nostrils daily as needed for rhinitis or allergies         gabapentin 100 MG capsule    NEURONTIN    90 capsule    Take 1-2 capsules (100-200 mg) by mouth 3 times daily Take 100 mg (1 capsule) by mouth in the morning, 100 mg at noon, 100 mg in the afternoon, and 200 mg (2 capsules) at bedtimeSee Admin Instructions Take 100 mg (1 capsule) by mouth in the morning, 100 mg at noon, 100 mg in the afternoon, and 200 mg (2 capsules) at bedtime    Spasm of muscle       methyl salicylate-menthol Oint     1 Tube    Apply 2 g topically every 6 hours as needed (pain).    Multiple scleroses       multivitamin, therapeutic with minerals Tabs tablet     30 each    Take 1 tablet by mouth daily    MS (multiple sclerosis) (H)       nystatin-triamcinolone cream    MYCOLOG II     Apply topically 2 times daily as needed        OMEGA-3 PO      Take 300-1,000 mg by mouth daily        OMEPRAZOLE PO      Take 20 mg by mouth 2 times daily (before meals)        oxyCODONE 5 MG IR tablet    ROXICODONE    30 tablet    Take 1 tablet (5 mg) by mouth every 4 hours as needed for moderate to severe pain    Kidney stones       PARoxetine 25 MG 24 hr tablet    PAXIL-CR    30 tablet    Take 1 tablet (25 mg) by mouth every morning    Episode of recurrent major depressive disorder, unspecified depression episode severity (H)       PROSTAT PO      Take by mouth 2 times daily 15gm/1oz        traMADol 50 MG tablet    ULTRAM    28 tablet    Take 1 tablet (50 mg) by mouth every 6 hours as needed    Spasm of muscle       TYLENOL 325 MG tablet   Generic drug:  acetaminophen      Take 650 mg by mouth every 4 hours as needed for mild pain

## 2017-08-08 NOTE — PROGRESS NOTES
Intrathecal Pump Clinic Note    Aga Fraga is being seen in the Intrathecal Baclofen Pump Clinic for a pump refill for Spasticity secondary to MS.      VERIFICATION OF PATIENT IDENTIFICATION AND PROCEDURE     Initials   Patient Name lmd   Patient  lmd   Procedure Verified by: lmd     Prior to the start of the procedure and with procedural staff participation, I verbally confirmed the patient s identity using two indicators, relevant allergies, that the procedure was appropriate and matched the consent or emergent situation, and that the correct equipment/implants were available. Immediately prior to starting the procedure I conducted the Time Out with the procedural staff and re-confirmed the patient s name, procedure, and site/side. (The Joint Commission universal protocol was followed.)  Yes    INTERIM HISTORY:  Aga has been doing well since our last visit.    INTERIM PAST MEDICAL HISTORY:  There have not been other physician visits since our last appointment.    She IS NOT currently involved in therapy.  She is also working on ADL's for a home program.  She has made functional gains in NA.    REVIEW OF SYSTEMS:  She has pain managed her baclofen pump and oral meds.  She has no difficulty with bladder issues..  She denies having issues with her bowels.  Patient denies side effects from the intrathecal Baclofen.    Current Outpatient Prescriptions   Medication Sig Dispense Refill     oxyCODONE (ROXICODONE) 5 MG IR tablet Take 1 tablet (5 mg) by mouth every 4 hours as needed for moderate to severe pain 30 tablet 0     cholecalciferol (VITAMIN D3) 1000 UNIT tablet Take 1,000 Units by mouth daily       Glatiramer Acetate (COPAXONE) 40 MG/ML SOSY Inject 1 mL Subcutaneous three times a week Every Tuesday, Thursday and Saturday       fluticasone (FLONASE) 50 MCG/ACT spray Spray 1 spray into both nostrils daily as needed for rhinitis or allergies       nystatin-triamcinolone (MYCOLOG II) cream Apply  topically 2 times daily as needed       Omega-3 Fatty Acids (OMEGA-3 PO) Take 300-1,000 mg by mouth daily       Pollen Extracts (PROSTAT PO) Take by mouth 2 times daily 15gm/1oz       collagenase ointment Apply topically as needed (Wound care for pressure ulcers)       acetaminophen (TYLENOL) 325 MG tablet Take 650 mg by mouth every 4 hours as needed for mild pain       OMEPRAZOLE PO Take 20 mg by mouth 2 times daily (before meals)        ferrous sulfate (IRON) 325 (65 FE) MG tablet Take 325 mg by mouth daily (with breakfast)       traMADol (ULTRAM) 50 MG tablet Take 1 tablet (50 mg) by mouth every 6 hours as needed 28 tablet      gabapentin (NEURONTIN) 100 MG capsule Take 1-2 capsules (100-200 mg) by mouth 3 times daily Take 100 mg (1 capsule) by mouth in the morning, 100 mg at noon, 100 mg in the afternoon, and 200 mg (2 capsules) at bedtimeSee Admin Instructions Take 100 mg (1 capsule) by mouth in the morning, 100 mg at noon, 100 mg in the afternoon, and 200 mg (2 capsules) at bedtime 90 capsule 0     PARoxetine (PAXIL-CR) 25 MG 24 hr tablet Take 1 tablet (25 mg) by mouth every morning 30 tablet      atenolol (TENORMIN) 25 MG tablet Take 1 tablet (25 mg) by mouth 2 times daily 30 tablet      calcium carbonate (OS-EVELINE 500 MG [place]. CA) 500 MG tablet Take 1 tablet (500 mg) by mouth daily 90 tablet      multivitamin, therapeutic with minerals (THERA-VIT-M) TABS Take 1 tablet by mouth daily 30 each 0     baclofen (LIORESAL) 20 MG tablet Take 0.5 tablets (10 mg) by mouth 4 times daily as needed for muscle spasms (Patient taking differently: Take 10 mg by mouth 2 times daily Taken Every AM and PM. Also given up to three times throughout the day PRN) 120 tablet 3     methyl salicylate-menthol (BENGAY) OINT Apply 2 g topically every 6 hours as needed (pain). 1 Tube 2       INTERIM SOCIAL HISTORY:  Social situation has not changed since the last visit.  She is unemployed.  She is [facility] in a nursing home [facility].    PHYSICAL EXAMINATION:  Interrogation of the Baclofen  pump shows that it is currently running at a Simple Continuous dose of 27.0 mcg/day. She also has a Flex (complex continuous) dose of 35.0 mcg/hour over 5 hours; 35.4 mcg/hour over 5 hours for total dose of 730.4 mcg/day.  Pump site is intact with no signs of infection, redness, swelling or seroma.      Clonus is not present.      Musculoskeletal contractures are present in the knees and ankles. She has bilateral knee flexion contractures with bilateral plantar flexion contractures, and her right ankle has an inversion flexion contracture.      Gait examination showed non-ambulatory.      Upper extremity motor control:  Unchanged from previous exam.      Lower extremity motor control/gait:  Unchanged from previous exam.      Oral motor control/speech:  Is optimized      Pain:  Is under adequate control.    Psychosocial: Somewhat lethargic today but lungs clear.          IMPRESSION/PLAN:  Visit Reason: Pump Refill  Refill Amount: 20 mL  NDC #: 2000 (14872-955-05)  Sterile Prep & Drape: Yes  Mililiters Withdrawn: 2.6  Mililiters Expected: 2.5  New Dose: 27.0 mcg/day, with flex dosing same as above  Alarm Date: 9/26/2017  Replacement Interval: 41 months  Spasticity: Optimal  Therapeutic Level: Optimal      Next pump refill appointment 9/21/2017    Niru Yun RN  Under the supervision of  Gigi Bassett MD  Department of Physical Medicine and Rehabilitation

## 2017-08-10 ENCOUNTER — OFFICE VISIT - HEALTHEAST (OUTPATIENT)
Dept: GERIATRICS | Facility: CLINIC | Age: 67
End: 2017-08-10

## 2017-08-10 DIAGNOSIS — G35 MULTIPLE SCLEROSIS (H): ICD-10-CM

## 2017-08-10 DIAGNOSIS — F32.A DEPRESSION, UNSPECIFIED DEPRESSION TYPE: ICD-10-CM

## 2017-08-10 DIAGNOSIS — L89.312: ICD-10-CM

## 2017-08-10 DIAGNOSIS — N31.9 NEUROGENIC BLADDER: ICD-10-CM

## 2017-08-10 DIAGNOSIS — M62.838 MUSCLE SPASMS OF BOTH LOWER EXTREMITIES: ICD-10-CM

## 2017-08-10 DIAGNOSIS — R09.89 CHEST RALES: ICD-10-CM

## 2017-08-10 DIAGNOSIS — G89.4 CHRONIC PAIN SYNDROME: ICD-10-CM

## 2017-08-10 DIAGNOSIS — I42.2 HYPERTROPHIC CARDIOMYOPATHY (H): ICD-10-CM

## 2017-08-10 ASSESSMENT — MIFFLIN-ST. JEOR: SCORE: 1114.63

## 2017-08-11 ENCOUNTER — PRE VISIT (OUTPATIENT)
Dept: UROLOGY | Facility: CLINIC | Age: 67
End: 2017-08-11

## 2017-08-22 ENCOUNTER — OFFICE VISIT (OUTPATIENT)
Dept: UROLOGY | Facility: CLINIC | Age: 67
End: 2017-08-22

## 2017-08-22 VITALS
HEIGHT: 65 IN | SYSTOLIC BLOOD PRESSURE: 120 MMHG | HEART RATE: 75 BPM | WEIGHT: 140 LBS | DIASTOLIC BLOOD PRESSURE: 66 MMHG | BODY MASS INDEX: 23.32 KG/M2

## 2017-08-22 DIAGNOSIS — N21.0 BLADDER STONE: Primary | ICD-10-CM

## 2017-08-22 RX ORDER — CELECOXIB 100 MG/1
100 CAPSULE ORAL
COMMUNITY
Start: 2011-03-30 | End: 2018-01-01

## 2017-08-22 RX ORDER — LORATADINE 10 MG/1
10 TABLET ORAL
COMMUNITY
Start: 2017-06-28

## 2017-08-22 RX ORDER — NYSTATIN 100000 [USP'U]/G
1 POWDER TOPICAL
COMMUNITY
Start: 2017-07-14 | End: 2018-01-01

## 2017-08-22 RX ORDER — POLYETHYLENE GLYCOL 3350 17 G/17G
17 POWDER, FOR SOLUTION ORAL
COMMUNITY

## 2017-08-22 RX ORDER — CETIRIZINE HYDROCHLORIDE 10 MG/1
10 TABLET ORAL
COMMUNITY
End: 2018-01-01

## 2017-08-22 RX ORDER — BENZONATATE 100 MG/1
100 CAPSULE ORAL
COMMUNITY
Start: 2017-06-28 | End: 2018-01-01

## 2017-08-22 ASSESSMENT — PAIN SCALES - GENERAL: PAINLEVEL: NO PAIN (0)

## 2017-08-22 NOTE — NURSING NOTE
"Chief Complaint   Patient presents with     RECHECK     6 month f/u after surgery       Blood pressure 120/66, pulse 75, height 1.651 m (5' 5\"), weight 63.5 kg (140 lb), not currently breastfeeding. Body mass index is 23.3 kg/(m^2).    Patient Active Problem List   Diagnosis     Cellulitis, leg     MS (multiple sclerosis) (H)     Cardiomyopathy (H)     Lower urinary tract infectious disease     Neurogenic bladder     Presence of implanted infusion pump - Baclofen Pump     Nephrolithiasis     Spasm of muscle     Renal stone     Baclofen pump failure     Advance Care Planning     GI bleed     Fever     Kidney stones     Neurogenic bladder     UTI (urinary tract infection)       Allergies   Allergen Reactions     Blood Transfusion Related (Informational Only) Other (See Comments)     Patient has a history of a clinically significant antibody against RBC antigens.  A delay in compatible RBCs may occur.     Blood-Group Specific Substance      Other reaction(s): Unknown  Other reaction(s): Other (See Comments)  Patient has a history of a clinically significant antibody against RBC antigens.  A delay in compatible RBCs may occur.  Patient has Anti-E and Anti-JKb.  Blood Product orders may be delayed.  Draw one red top and two purple top tubes for ALL Type and Screen/ Type and Crossmatch orders.   Patient has Anti-E   Blood Product orders may be delayed.  Draw one red top and two purple top tubes for ALL Type and Screen/ Type and Crossmatch orders.   Patient has Anti-E   Blood Product orders may be delayed.  Draw one red top and two purple top tubes for ALL Type and Screen/ Type and Crossmatch orders.      Aspartame Nausea and Vomiting     Aspartame Nausea and Vomiting     NUTRA SWEET POWDER     Cephalexin      Other reaction(s): Other (See Comments)  Nausea and vomiting  Nausea and vomiting  Nausea and vomiting     Diuretic Other (See Comments)     Other reaction(s): Other (See Comments)  Should not use diuretics due to " risk of hypotension with hypertrophic cardiomyopathy   Should not use diuretics due to risk of hypotension with hypertrophic cardiomyopathy      Fructose Nausea     Furosemide Unknown     Other reaction(s): Other (See Comments)  Hypertrophic cardiomyopathy  Cannot take any diuretics due to known hypertrophic cardiomyopathy and risk of hypotension from these.     Hydrochlorothiazide Other (See Comments)     HCTZ, Lasix, Spironolactone  Cannot take any diuretics due to known hypertropic cardiomyopathy     Spironolactone Unknown     Other reaction(s): Other (See Comments)  Hypertrophic cardiomyopathy  Cannot take any diuretics due to known hypertrophic cardiomyopathy and risk of hypotension from these.  Cannot take any diuretics due to known hypertrophic cardiomyopathy and risk of hypotension from these.  Hypertrophic cardiomyopathy     Sulfamethoxazole W/Trimethoprim Unknown     Neutropenia; rapidly resolved off the medication     Sulfamethoxazole-Trimethoprim      Other reaction(s): Neutropenia  Rapidly resolved off the medication     Lanolin Rash     Lanolin Oil Rash       Current Outpatient Prescriptions   Medication Sig Dispense Refill     celecoxib (CELEBREX) 100 MG capsule Take 100 mg by mouth       benzonatate (TESSALON) 100 MG capsule Take 100 mg by mouth       loratadine (CLARITIN) 10 MG tablet Take 10 mg by mouth       nystatin (MYCOSTATIN) 904907 UNIT/GM POWD Apply 1 Application topically       aspirin 81 MG tablet Take 81 mg by mouth       cetirizine (ZYRTEC) 10 MG tablet Take 10 mg by mouth       DOCOSAHEXAENOIC ACID PO Take 1 g by mouth       polyethylene glycol (MIRALAX/GLYCOLAX) Packet Take 17 g by mouth       oxyCODONE (ROXICODONE) 5 MG IR tablet Take 1 tablet (5 mg) by mouth every 4 hours as needed for moderate to severe pain 30 tablet 0     cholecalciferol (VITAMIN D3) 1000 UNIT tablet Take 1,000 Units by mouth daily       Glatiramer Acetate (COPAXONE) 40 MG/ML SOSY Inject 1 mL Subcutaneous three  times a week Every Tuesday, Thursday and Saturday       fluticasone (FLONASE) 50 MCG/ACT spray Spray 1 spray into both nostrils daily as needed for rhinitis or allergies       nystatin-triamcinolone (MYCOLOG II) cream Apply topically 2 times daily as needed       Omega-3 Fatty Acids (OMEGA-3 PO) Take 300-1,000 mg by mouth daily       Pollen Extracts (PROSTAT PO) Take by mouth 2 times daily 15gm/1oz       collagenase ointment Apply topically as needed (Wound care for pressure ulcers)       acetaminophen (TYLENOL) 325 MG tablet Take 650 mg by mouth every 4 hours as needed for mild pain       OMEPRAZOLE PO Take 20 mg by mouth 2 times daily (before meals)        ferrous sulfate (IRON) 325 (65 FE) MG tablet Take 325 mg by mouth daily (with breakfast)       traMADol (ULTRAM) 50 MG tablet Take 1 tablet (50 mg) by mouth every 6 hours as needed 28 tablet      gabapentin (NEURONTIN) 100 MG capsule Take 1-2 capsules (100-200 mg) by mouth 3 times daily Take 100 mg (1 capsule) by mouth in the morning, 100 mg at noon, 100 mg in the afternoon, and 200 mg (2 capsules) at bedtimeSee Admin Instructions Take 100 mg (1 capsule) by mouth in the morning, 100 mg at noon, 100 mg in the afternoon, and 200 mg (2 capsules) at bedtime 90 capsule 0     PARoxetine (PAXIL-CR) 25 MG 24 hr tablet Take 1 tablet (25 mg) by mouth every morning 30 tablet      atenolol (TENORMIN) 25 MG tablet Take 1 tablet (25 mg) by mouth 2 times daily 30 tablet      calcium carbonate (OS-EVELINE 500 MG [place]. [place]) 500 MG tablet Take 1 tablet (500 mg) by mouth daily 90 tablet      multivitamin, therapeutic with minerals (THERA-VIT-M) TABS Take 1 tablet by mouth daily 30 each 0     baclofen (LIORESAL) 20 MG tablet Take 0.5 tablets (10 mg) by mouth 4 times daily as needed for muscle spasms (Patient taking differently: Take 10 mg by mouth 2 times daily Taken Every AM and PM. Also given up to three times throughout the day PRN) 120 tablet 3     methyl salicylate-menthol (BENGAY)  OINT Apply 2 g topically every 6 hours as needed (pain). 1 Tube 2       Social History   Substance Use Topics     Smoking status: Former Smoker     Packs/day: 1.00     Years: 20.00     Types: Cigarettes     Quit date: 1/1/1980     Smokeless tobacco: Never Used     Alcohol use No       Izabel Villalta LPN  8/22/2017  2:07 PM

## 2017-08-22 NOTE — LETTER
"8/22/2017       RE: Aga Fraga  [address]  [place] [address]     Dear Colleague,    Thank you for referring your patient, Aga Fraga, to the [facility] UROLOGY AND INST FOR PROSTATE AND UROLOGIC CANCERS at [facility]. Please see a copy of my visit note below.          UROLOGY FOLLOW-UP NOTE          Chief Complaint:   Today I had the pleasure of seeing Ms. Aga Fraga in follow-up for a chief complaint of nephrolithiasis.         Interval Update    Aga Fraga is a very pleasant 67 year old female last seen 4 months ago.  At our last visit she was doing well and noted worsened leakage from her stoma. Since last being seen she states her incontinence continues to worsen. She is on CIC 4x per day and uses size 14 catheter. She is quite bothered by this. Most recent US 4/2017 reveals small stone in bladder. She has no new flank pain. Denies hematuria, UTI's.          Physical Exam:   Patient is a 67 year old  female   Vitals: Blood pressure 120/66, pulse 75, height 1.651 m (5' 5\"), weight 63.5 kg (140 lb), not currently breastfeeding.  General Appearance Adult: Alert, no acute distress, oriented  HENT: throat/mouth:normal, good dentition  Neck: No adenopathy,masses or thyromegaly. Contracted.  Lungs: no respiratory distress, or pursed lip breathing  Heart: No obvious jugular venous distension present  Abdomen: soft, nontender, no organomegaly or masses, Body mass index is 23.3 kg/(m^2). Urine pooling in umbilicus and Mitrofanoff   Lymphatics: No cervical or supraclavicular adenopathy  Musculoskeltal: extremities normal, no peripheral edema  Skin: no suspicious lesions or rashes  Neuro: Alert, oriented, speech and mentation normal  Psych: affect and mood normal      Labs and Pathology:    I personally reviewed all applicable laboratory data and went over findings with patient  Significant for:    CBC RESULTS:  Recent Labs   Lab Test  01/13/17   " 0602  01/12/17   1612  01/12/17   1052  12/19/16   1550   WBC  5.1  7.0  5.8  4.9   HGB  9.4*  10.9*  11.8  10.7*   PLT  150  168  193  130*        BMP RESULTS:  Recent Labs   Lab Test  04/03/17   1421  01/13/17   0602  01/12/17   1612  01/12/17   1052   NA  141  144  142  143   POTASSIUM  4.7  4.1  4.3  4.1   CHLORIDE  105  107  105  105   CO2  31  34*  32  33*   ANIONGAP  5  3  5  5   GLC  127*  104*  139*  107*   BUN  20 13 16 17   CR  0.37*  0.50*  0.43*  0.38*   GFRESTIMATED  >90  Non  GFR Calc    >90  Non  GFR Calc    >90  Non  GFR Calc    >90  Non  GFR Calc     GFRESTBLACK  >90   GFR Calc    >90   GFR Calc    >90   GFR Calc    >90   GFR Calc     EVELINE  9.9  8.9  9.3  10.0       UA RESULTS:   Recent Labs   Lab Test  07/12/16   0922  06/10/16   0023  02/04/15   1420   SG  1.010  1.021  1.010   URINEPH  7.0  8.0*  5.5   NITRITE  Positive*  Positive*  Negative   RBCU  >182*  43*  12*   WBCU  >182*  >182*  28*         Imaging:    I personally reviewed all applicable imaging and went over findings with patient.  Significant for renal US 4/3/2017     IMPRESSION:  1.  Normal sonographic appearance of the kidneys.     2.  Redemonstration of bladder calculus.           Past Medical History:     Past Medical History:   Diagnosis Date     Anxiety      Cardiomyopathy (H)      Chronic pain      Decubitus ulcer     buttocks, stage II     Frequent UTI      GI bleed     massive duodenal bulb ulcer requiring GDA embolization      Hypertrophic cardiomyopathy (H)      MRSA (methicillin resistant Staphylococcus aureus)      MS (multiple sclerosis) (H)      Multiple sclerosis (H)      Nephrolithiasis      Neurogenic bladder      Rectal prolapse             Past Surgical History:     Past Surgical History:   Procedure Laterality Date     ADRENALECTOMY       BLADDER AUGMENTATION  9/11/2000    CREATION OF  NEW RYAN STOMA     C FIX RECTAL PROLAPSE,PERINEAL APPR       CHOLECYSTECTOMY       CYSTOSCOPY, INJECT VESICOURETERAL REFLUX GEL N/A 10/26/2016    Procedure: CYSTOSCOPY, INJECT VESICOURETERAL REFLUX GEL;  Surgeon: Phil Ruelas MD;  Location: [facility] OR     DILATION AND CURETTAGE      x3     ESOPHAGOSCOPY, GASTROSCOPY, DUODENOSCOPY (EGD), COMBINED N/A 2/23/2015    Procedure: COMBINED ESOPHAGOSCOPY, GASTROSCOPY, DUODENOSCOPY (EGD);  Surgeon: Denzel Ferrara Chi, MD;  Location: [facility] GI     LASER HOLMIUM NEPHROLITHOTOMY VIA PERCUTANEOUS NEPHROSTOMY  9/11/2013    Procedure: LASER HOLMIUM NEPHROLITHOTOMY VIA PERCUTANEOUS NEPHROSTOMY;  Left Percutaneous Access, Left Percutaneous Nephrolithotomy, Nephroscopy, Placement of 12 Fr Coude to Mitrofanoff;  Surgeon: Dao Ureña MD;  Location[facility]     LASER HOLMIUM NEPHROLITHOTOMY VIA PERCUTANEOUS NEPHROSTOMY  11/6/2013    Procedure: LASER HOLMIUM NEPHROLITHOTOMY VIA PERCUTANEOUS NEPHROSTOMY;  Second Look Left Percutaneous Nephrolithotomy,  ;  Surgeon: Dao Ureña MD;  [facility]     LASER HOLMIUM NEPHROLITHOTOMY VIA PERCUTANEOUS NEPHROSTOMY Left 12/15/2016    Procedure: LASER HOLMIUM NEPHROLITHOTOMY VIA PERCUTANEOUS NEPHROSTOMY;  Surgeon: Darius Hyde MD;  [facility] OR     MITROFANOFF PROCEDURE (APPENDIX CONDUIT)      Fern     PERCUTANEOUS NEPHROLITHOTOMY Left 12/19/2016    Procedure: PERCUTANEOUS NEPHROLITHOTOMY;  Surgeon: Darius Hyde MD;  [facility]     PERCUTANEOUS NEPHROLITHOTOMY Right 1/12/2017    Procedure: PERCUTANEOUS NEPHROLITHOTOMY;  Surgeon: Darius Hyde MD;  [facility]     PICC INSERTION Right 6/14/2016    4fr SL BioFlo PICC, 36cm (1cm external) in the R medial brachial vein     REVISE PUMP BACLOFEN  4/28/2014    Procedure: REVISE PUMP BACLOFEN;  Surgeon: Milton Gutierrez MD;  Location: [facility] OR     REVISION MITROFANOFF CHILD  9/11/2000    removal of Mitrofanoff (Fern)     TUBAL LIGATION               Medications     Current Outpatient Prescriptions   Medication     celecoxib (CELEBREX) 100 MG capsule     benzonatate (TESSALON) 100 MG capsule     loratadine (CLARITIN) 10 MG tablet     nystatin (MYCOSTATIN) 306077 UNIT/GM POWD     aspirin 81 MG tablet     cetirizine (ZYRTEC) 10 MG tablet     DOCOSAHEXAENOIC ACID PO     polyethylene glycol (MIRALAX/GLYCOLAX) Packet     oxyCODONE (ROXICODONE) 5 MG IR tablet     cholecalciferol (VITAMIN D3) 1000 UNIT tablet     Glatiramer Acetate (COPAXONE) 40 MG/ML SOSY     fluticasone (FLONASE) 50 MCG/ACT spray     nystatin-triamcinolone (MYCOLOG II) cream     Omega-3 Fatty Acids (OMEGA-3 PO)     Pollen Extracts (PROSTAT PO)     collagenase ointment     acetaminophen (TYLENOL) 325 MG tablet     OMEPRAZOLE PO     ferrous sulfate (IRON) 325 (65 FE) MG tablet     traMADol (ULTRAM) 50 MG tablet     gabapentin (NEURONTIN) 100 MG capsule     PARoxetine (PAXIL-CR) 25 MG 24 hr tablet     atenolol (TENORMIN) 25 MG tablet     calcium carbonate (OS-EVELINE 500 MG [place]. [place]) 500 MG tablet     multivitamin, therapeutic with minerals (THERA-VIT-M) TABS     baclofen (LIORESAL) 20 MG tablet     methyl salicylate-menthol (BENGAY) OINT     No current facility-administered medications for this visit.             Family History:     Family History   Problem Relation Age of Onset     DIABETES Mother      CANCER Mother      small cell lung CA;      HEART DISEASE Sister      HOCM;  at age 42            Social History:     Social History     Social History     Marital status:      Spouse name: N/A     Number of children: N/A     Years of education: N/A     Occupational History     Not on file.     Social History Main Topics     Smoking status: Former Smoker     Packs/day: 1.00     Years: 20.00     Types: Cigarettes     Quit date: 1980     Smokeless tobacco: Never Used     Alcohol use No     Drug use: No     Sexual activity: No     Other Topics Concern     Not on file     Social  "History Narrative    Mom  at age 50's from breast cancer.    Dad  at age 95 from old age.        In a relationship at her living facility- \"companionship\".    Brother 70 years old    Sister 61 years old     Second sister  from cardiomyopathy    2 daughters alive and well                Allergies:   Blood transfusion related (informational only); Blood-group specific substance; Aspartame; Aspartame; Cephalexin; Diuretic; Fructose; Furosemide; Hydrochlorothiazide; Spironolactone; Sulfamethoxazole w/trimethoprim; Sulfamethoxazole-trimethoprim; Lanolin; and Lanolin oil         Review of Systems:  From intake questionnaire   Negative 14 system review except as noted on HPI, nurse's note.           Assessment/Plan     Assessment:     66 yo F with hx of nephrolithiasis. No evidence of current stones. Main complaint is urinary leakage from stoma    Plan:    - Return to see Dr. Tapia/Coreen for consideration of Deflux into channel. Consider stone removal for bladder at that time     Scribe Disclosure:   INaga, am serving as a scribe; to document services personally performed by Darius Hyde MD based on data collection and the provider's statements to me.     IDarius saw and evaluated this patient and agree with the plan as stated above       CC:  Pawan Angela Karen M      >15 minutes were spent face to face with patient over half of which was spent providing medical counseling regarding urinary leakage, nephrolithiasis, bladder stone    Again, thank you for allowing me to participate in the care of your patient.      Sincerely,    Darius Hyde MD      "

## 2017-08-22 NOTE — MR AVS SNAPSHOT
After Visit Summary   8/22/2017    Aga Fraga    MRN: [id number]           Patient Information     Date Of Birth          1950        Visit Information        Provider Department      8/22/2017 2:00 PM Darius Hyde MD [facility] Urology and [facility] for Prostate and Urologic Cancers        Today's Diagnoses     Bladder stone    -  1       Follow-ups after your visit        Your next 10 appointments already scheduled     Sep 11, 2017  4:00 PM CDT   (Arrive by 3:45 PM)   Return Visit with Pawan Angela MD   [facility] Urology and [facility] for Prostate and Urologic Cancers ([facility])    [address]  4th Floor  [facility] [id number] [phone]            Sep 21, 2017  1:00 PM CDT   (Arrive by 12:45 PM)   Return Visit with  Pmr Nurse   [facility] Physical Medicine and Rehabilitation ([facility])    [address]  3rd Floor  [facility] [address] [phone]              Who to contact     Please call your clinic at [phone] to:    Ask questions about your health    Make or cancel appointments    Discuss your medicines    Learn about your test results    Speak to your doctor   If you have compliments or concerns about an experience at your clinic, or if you wish to file a complaint, please contact [facility] Patient Relations at [phone] or email us at [email or web address]         Additional Information About Your Visit        MyChart Information     [employer] is an electronic gateway that provides easy, online access to your medical records. With [employer], you can request a clinic appointment, read your test results, renew a prescription or communicate with your care team.     To sign up for [employer] visit the website at [email or web address]   You will be asked to enter the access code listed below, as well as some personal information. Please follow the directions to  "create your username and password.     Your access code is: [email or web address]  Expires: 10/22/2017  6:30 AM     Your access code will  in 90 days. If you need help or a new code, please contact your [facility] or call [phone] for assistance.        Care EveryWhere ID     This is your Care EveryWhere ID. This could be used by other organizations to access your [facility] medical records  [id number]        Your Vitals Were     Pulse Height BMI (Body Mass Index)             75 1.651 m (5' 5\") 23.3 kg/m2          Blood Pressure from Last 3 Encounters:   17 120/66   17 122/54   17 134/63    Weight from Last 3 Encounters:   17 63.5 kg (140 lb)   17 63 kg (139 lb)   17 63 kg (139 lb)              Today, you had the following     No orders found for display         Today's Medication Changes          These changes are accurate as of: 17  4:27 PM.  If you have any questions, ask your nurse or doctor.               These medicines have changed or have updated prescriptions.        Dose/Directions    baclofen 20 MG tablet   Commonly known as:  LIORESAL   This may have changed:    - when to take this  - additional instructions   Used for:  Multiple sclerosis (H), Muscle spasm        Dose:  10 mg   Take 0.5 tablets (10 mg) by mouth 4 times daily as needed for muscle spasms   Quantity:  120 tablet   Refills:  3                Primary Care Provider Office Phone # Fax #    Astrid RUTH Cara [phone]       [facility] AFTER HOURS [address] [place] [address]        Equal Access to Services     [facility] MALKA AH: Hadii rad Cardona, waaxda luqadaha, qaybta kaalmacarmen garvey. So [facility] [phone].    ATENCIÓN: Si habla español, tiene a urena disposición servicios gratuitos de asistencia lingüística. Llame al [phone].    We comply with applicable federal civil rights laws and " [place] laws. We do not discriminate on the basis of race, color, national origin, age, disability sex, sexual orientation or gender identity.            Thank you!     Thank you for choosing [facility] UROLOGY AND [facility] FOR PROSTATE AND UROLOGIC CANCERS  for your care. Our goal is always to provide you with excellent care. Hearing back from our patients is one way we can continue to improve our services. Please take a few minutes to complete the written survey that you may receive in the mail after your visit with us. Thank you!             Your Updated Medication List - Protect others around you: Learn how to safely use, store and throw away your medicines at www.disposemymeds.org.          This list is accurate as of: 8/22/17  4:27 PM.  Always use your most recent med list.                   Brand Name Dispense Instructions for use Diagnosis    aspirin 81 MG tablet      Take 81 mg by mouth        atenolol 25 MG tablet    TENORMIN    30 tablet    Take 1 tablet (25 mg) by mouth 2 times daily    HOCM (hypertrophic obstructive cardiomyopathy) (H)       baclofen 20 MG tablet    LIORESAL    120 tablet    Take 0.5 tablets (10 mg) by mouth 4 times daily as needed for muscle spasms    Multiple sclerosis (H), Muscle spasm       benzonatate 100 MG capsule    TESSALON     Take 100 mg by mouth        calcium carbonate 1250 MG tablet    OS-EVELINE 500 mg [place]. Ca    90 tablet    Take 1 tablet (500 mg) by mouth daily    MS (multiple sclerosis) (H)       celecoxib 100 MG capsule    celeBREX     Take 100 mg by mouth        cetirizine 10 MG tablet    zyrTEC     Take 10 mg by mouth        cholecalciferol 1000 UNIT tablet    vitamin D     Take 1,000 Units by mouth daily        collagenase ointment      Apply topically as needed (Wound care for pressure ulcers)        COPAXONE 40 MG/ML Sosy   Generic drug:  Glatiramer Acetate      Inject 1 mL Subcutaneous three times a week Every Tuesday, Thursday and Saturday        DOCOSAHEXAENOIC ACID  PO      Take 1 g by mouth        ferrous sulfate 325 (65 FE) MG tablet    IRON     Take 325 mg by mouth daily (with breakfast)        fluticasone 50 MCG/ACT spray    FLONASE     Spray 1 spray into both nostrils daily as needed for rhinitis or allergies        gabapentin 100 MG capsule    NEURONTIN    90 capsule    Take 1-2 capsules (100-200 mg) by mouth 3 times daily Take 100 mg (1 capsule) by mouth in the morning, 100 mg at noon, 100 mg in the afternoon, and 200 mg (2 capsules) at bedtimeSee Admin Instructions Take 100 mg (1 capsule) by mouth in the morning, 100 mg at noon, 100 mg in the afternoon, and 200 mg (2 capsules) at bedtime    Spasm of muscle       loratadine 10 MG tablet    CLARITIN     Take 10 mg by mouth        methyl salicylate-menthol Oint     1 Tube    Apply 2 g topically every 6 hours as needed (pain).    Multiple scleroses       multivitamin, therapeutic with minerals Tabs tablet     30 each    Take 1 tablet by mouth daily    MS (multiple sclerosis) (H)       nystatin 020482 UNIT/GM Powd    MYCOSTATIN     Apply 1 Application topically        nystatin-triamcinolone cream    MYCOLOG II     Apply topically 2 times daily as needed        OMEGA-3 PO      Take 300-1,000 mg by mouth daily        OMEPRAZOLE PO      Take 20 mg by mouth 2 times daily (before meals)        oxyCODONE 5 MG IR tablet    ROXICODONE    30 tablet    Take 1 tablet (5 mg) by mouth every 4 hours as needed for moderate to severe pain    Kidney stones       PARoxetine 25 MG 24 hr tablet    PAXIL-CR    30 tablet    Take 1 tablet (25 mg) by mouth every morning    Episode of recurrent major depressive disorder, unspecified depression episode severity (H)       polyethylene glycol Packet    MIRALAX/GLYCOLAX     Take 17 g by mouth        PROSTAT PO      Take by mouth 2 times daily 15gm/1oz        traMADol 50 MG tablet    ULTRAM    28 tablet    Take 1 tablet (50 mg) by mouth every 6 hours as needed    Spasm of muscle       TYLENOL 325 MG  tablet   Generic drug:  acetaminophen      Take 650 mg by mouth every 4 hours as needed for mild pain

## 2017-08-22 NOTE — PROGRESS NOTES
"         UROLOGY FOLLOW-UP NOTE          Chief Complaint:   Today I had the pleasure of seeing Ms. Aga Fraga in follow-up for a chief complaint of nephrolithiasis.         Interval Update    Aga Fraga is a very pleasant 67 year old female last seen 4 months ago.  At our last visit she was doing well and noted worsened leakage from her stoma. Since last being seen she states her incontinence continues to worsen. She is on CIC 4x per day and uses size 14 catheter. She is quite bothered by this. Most recent US 4/2017 reveals small stone in bladder. She has no new flank pain. Denies hematuria, UTI's.          Physical Exam:   Patient is a 67 year old  female   Vitals: Blood pressure 120/66, pulse 75, height 1.651 m (5' 5\"), weight 63.5 kg (140 lb), not currently breastfeeding.  General Appearance Adult: Alert, no acute distress, oriented  HENT: throat/mouth:normal, good dentition  Neck: No adenopathy,masses or thyromegaly. Contracted.  Lungs: no respiratory distress, or pursed lip breathing  Heart: No obvious jugular venous distension present  Abdomen: soft, nontender, no organomegaly or masses, Body mass index is 23.3 kg/(m^2). Urine pooling in umbilicus and Mitrofanoff   Lymphatics: No cervical or supraclavicular adenopathy  Musculoskeltal: extremities normal, no peripheral edema  Skin: no suspicious lesions or rashes  Neuro: Alert, oriented, speech and mentation normal  Psych: affect and mood normal      Labs and Pathology:    I personally reviewed all applicable laboratory data and went over findings with patient  Significant for:    CBC RESULTS:  Recent Labs   Lab Test  01/13/17   0602  01/12/17   1612  01/12/17   1052  12/19/16   1550   WBC  5.1  7.0  5.8  4.9   HGB  9.4*  10.9*  11.8  10.7*   PLT  150  168  193  130*        BMP RESULTS:  Recent Labs   Lab Test  04/03/17   1421  01/13/17   0602  01/12/17   1612  01/12/17   1052   NA  141  144  142  143   POTASSIUM  4.7  4.1  4.3  4.1   CHLORIDE  " 105  107  105  105   CO2  31  34*  32  33*   ANIONGAP  5  3  5  5   GLC  127*  104*  139*  107*   BUN  20  13  16  17   CR  0.37*  0.50*  0.43*  0.38*   GFRESTIMATED  >90  Non  GFR Calc    >90  Non  GFR Calc    >90  Non  GFR Calc    >90  Non  GFR Calc     GFRESTBLACK  >90   GFR Calc    >90   GFR Calc    >90   GFR Calc    >90   GFR Calc     EVELINE  9.9  8.9  9.3  10.0       UA RESULTS:   Recent Labs   Lab Test  07/12/16   0922  06/10/16   0023  02/04/15   1420   SG  1.010  1.021  1.010   URINEPH  7.0  8.0*  5.5   NITRITE  Positive*  Positive*  Negative   RBCU  >182*  43*  12*   WBCU  >182*  >182*  28*         Imaging:    I personally reviewed all applicable imaging and went over findings with patient.  Significant for renal US 4/3/2017     IMPRESSION:  1.  Normal sonographic appearance of the kidneys.     2.  Redemonstration of bladder calculus.           Past Medical History:     Past Medical History:   Diagnosis Date     Anxiety      Cardiomyopathy (H)      Chronic pain      Decubitus ulcer     buttocks, stage II     Frequent UTI      GI bleed     massive duodenal bulb ulcer requiring GDA embolization      Hypertrophic cardiomyopathy (H)      MRSA (methicillin resistant Staphylococcus aureus)      MS (multiple sclerosis) (H)      Multiple sclerosis (H)      Nephrolithiasis      Neurogenic bladder      Rectal prolapse             Past Surgical History:     Past Surgical History:   Procedure Laterality Date     ADRENALECTOMY       BLADDER AUGMENTATION  9/11/2000    CREATION OF NEW RYAN STOMA     C FIX RECTAL PROLAPSE,PERINEAL APPR       CHOLECYSTECTOMY       CYSTOSCOPY, INJECT VESICOURETERAL REFLUX GEL N/A 10/26/2016    Procedure: CYSTOSCOPY, INJECT VESICOURETERAL REFLUX GEL;  Surgeon: Phil Ruelas MD;  Location: [facility]     DILATION AND CURETTAGE      x3     ESOPHAGOSCOPY,  GASTROSCOPY, DUODENOSCOPY (EGD), COMBINED N/A 2/23/2015    Procedure: COMBINED ESOPHAGOSCOPY, GASTROSCOPY, DUODENOSCOPY (EGD);  Surgeon: Denzel Ferrara Chi, MD;  Location: [facility] GI     LASER HOLMIUM NEPHROLITHOTOMY VIA PERCUTANEOUS NEPHROSTOMY  9/11/2013    Procedure: LASER HOLMIUM NEPHROLITHOTOMY VIA PERCUTANEOUS NEPHROSTOMY;  Left Percutaneous Access, Left Percutaneous Nephrolithotomy, Nephroscopy, Placement of 12 Fr Coude to Mitrofanoff;  Surgeon: Dao Ureña MD;  [facility]     LASER HOLMIUM NEPHROLITHOTOMY VIA PERCUTANEOUS NEPHROSTOMY  11/6/2013    Procedure: LASER HOLMIUM NEPHROLITHOTOMY VIA PERCUTANEOUS NEPHROSTOMY;  Second Look Left Percutaneous Nephrolithotomy,  ;  Surgeon: Dao Ureña MD;  [facility]     LASER HOLMIUM NEPHROLITHOTOMY VIA PERCUTANEOUS NEPHROSTOMY Left 12/15/2016    Procedure: LASER HOLMIUM NEPHROLITHOTOMY VIA PERCUTANEOUS NEPHROSTOMY;  Surgeon: Darius Hyde MD;  [facility]     MITROFANOFF PROCEDURE (APPENDIX CONDUIT)      Fern     PERCUTANEOUS NEPHROLITHOTOMY Left 12/19/2016    Procedure: PERCUTANEOUS NEPHROLITHOTOMY;  Surgeon: Darius Hyde MD;  [facility]     PERCUTANEOUS NEPHROLITHOTOMY Right 1/12/2017    Procedure: PERCUTANEOUS NEPHROLITHOTOMY;  Surgeon: Darius Hyde MD;  [facility]     PICC INSERTION Right 6/14/2016    4fr SL BioFlo PICC, 36cm (1cm external) in the R medial brachial vein     REVISE PUMP BACLOFEN  4/28/2014    Procedure: REVISE PUMP BACLOFEN;  Surgeon: Milton Gutierrez MD;  Location: [facility]     REVISION MITROFANOFF CHILD  9/11/2000    removal of Mitrofanoff (Fern)     TUBAL LIGATION              Medications     Current Outpatient Prescriptions   Medication     celecoxib (CELEBREX) 100 MG capsule     benzonatate (TESSALON) 100 MG capsule     loratadine (CLARITIN) 10 MG tablet     nystatin (MYCOSTATIN) 357259 UNIT/GM POWD     aspirin 81 MG tablet     cetirizine (ZYRTEC) 10 MG tablet      "DOCOSAHEXAENOIC ACID PO     polyethylene glycol (MIRALAX/GLYCOLAX) Packet     oxyCODONE (ROXICODONE) 5 MG IR tablet     cholecalciferol (VITAMIN D3) 1000 UNIT tablet     Glatiramer Acetate (COPAXONE) 40 MG/ML SOSY     fluticasone (FLONASE) 50 MCG/ACT spray     nystatin-triamcinolone (MYCOLOG II) cream     Omega-3 Fatty Acids (OMEGA-3 PO)     Pollen Extracts (PROSTAT PO)     collagenase ointment     acetaminophen (TYLENOL) 325 MG tablet     OMEPRAZOLE PO     ferrous sulfate (IRON) 325 (65 FE) MG tablet     traMADol (ULTRAM) 50 MG tablet     gabapentin (NEURONTIN) 100 MG capsule     PARoxetine (PAXIL-CR) 25 MG 24 hr tablet     atenolol (TENORMIN) 25 MG tablet     calcium carbonate (OS-EVELINE 500 MG [place]. [place]) 500 MG tablet     multivitamin, therapeutic with minerals (THERA-VIT-M) TABS     baclofen (LIORESAL) 20 MG tablet     methyl salicylate-menthol (BENGAY) OINT     No current facility-administered medications for this visit.             Family History:     Family History   Problem Relation Age of Onset     DIABETES Mother      CANCER Mother      small cell lung CA;      HEART DISEASE Sister      HOCM;  at age 42            Social History:     Social History     Social History     Marital status:      Spouse name: N/A     Number of children: N/A     Years of education: N/A     Occupational History     Not on file.     Social History Main Topics     Smoking status: Former Smoker     Packs/day: 1.00     Years: 20.00     Types: Cigarettes     Quit date: 1980     Smokeless tobacco: Never Used     Alcohol use No     Drug use: No     Sexual activity: No     Other Topics Concern     Not on file     Social History Narrative    Mom  at age 50's from breast cancer.    Dad  at age 95 from old age.        In a relationship at her living facility- \"companionship\".    Brother 70 years old    Sister 61 years old     Second sister  from cardiomyopathy    2 daughters alive and well     "            Allergies:   Blood transfusion related (informational only); Blood-group specific substance; Aspartame; Aspartame; Cephalexin; Diuretic; Fructose; Furosemide; Hydrochlorothiazide; Spironolactone; Sulfamethoxazole w/trimethoprim; Sulfamethoxazole-trimethoprim; Lanolin; and Lanolin oil         Review of Systems:  From intake questionnaire   Negative 14 system review except as noted on HPI, nurse's note.           Assessment/Plan     Assessment:     66 yo F with hx of nephrolithiasis. No evidence of current stones. Main complaint is urinary leakage from stoma    Plan:    - Return to see Dr. Tapia/Coreen for consideration of Deflux into channel. Consider stone removal for bladder at that time     Scribe Disclosure:   INaga, am serving as a scribe; to document services personally performed by Darius Hyde MD based on data collection and the provider's statements to me.     IDarius saw and evaluated this patient and agree with the plan as stated above       CC:  Pawan Angela Karen M      >15 minutes were spent face to face with patient over half of which was spent providing medical counseling regarding urinary leakage, nephrolithiasis, bladder stone

## 2017-08-30 ENCOUNTER — PRE VISIT (OUTPATIENT)
Dept: UROLOGY | Facility: CLINIC | Age: 67
End: 2017-08-30

## 2017-08-30 NOTE — TELEPHONE ENCOUNTER
Patient with history of NGB coming in for consideration of Deflux into channel. Patient chart reviewed, no need for call, all records available and ready for appointment.

## 2017-09-22 ENCOUNTER — ALLIED HEALTH/NURSE VISIT (OUTPATIENT)
Dept: PHYSICAL MEDICINE AND REHAB | Facility: CLINIC | Age: 67
End: 2017-09-22

## 2017-09-22 DIAGNOSIS — Z95.828 PRESENCE OF IMPLANTED INFUSION PUMP: ICD-10-CM

## 2017-09-22 DIAGNOSIS — M62.838 SPASM OF MUSCLE: ICD-10-CM

## 2017-09-22 DIAGNOSIS — G35 MULTIPLE SCLEROSIS (H): Primary | ICD-10-CM

## 2017-09-22 NOTE — PROGRESS NOTES
Intrathecal Pump Clinic Note    Aga Fraga is being seen in the Intrathecal Baclofen Pump Clinic for a pump refill for Spasticity secondary to MS.      VERIFICATION OF PATIENT IDENTIFICATION AND PROCEDURE     Initials   Patient Name lmd   Patient  lmd   Procedure Verified by: lmd     Prior to the start of the procedure and with procedural staff participation, I verbally confirmed the patient s identity using two indicators, relevant allergies, that the procedure was appropriate and matched the consent or emergent situation, and that the correct equipment/implants were available. Immediately prior to starting the procedure I conducted the Time Out with the procedural staff and re-confirmed the patient s name, procedure, and site/side. (The Joint Commission universal protocol was followed.)  Yes      INTERIM HISTORY:  Aga has been doing well since our last visit.    INTERIM PAST MEDICAL HISTORY:  There have been other physician visits since our last appointment.  She has been seen by Urology for ongoing renal stone management.  No issues since pump last refilled.  She IS NOT currently involved in therapy.  She is also working on ADL's for a home program.  She has made functional gains in .    REVIEW OF SYSTEMS:  She has pain managed her baclofen pump and oral meds.  She has no difficulty with bladder issues during this interval..  She denies having issues with her bowels.  Patient denies side effects from the intrathecal Baclofen.    Current Outpatient Prescriptions   Medication Sig Dispense Refill     aspirin 81 MG tablet Take 81 mg by mouth       celecoxib (CELEBREX) 100 MG capsule Take 100 mg by mouth       cetirizine (ZYRTEC) 10 MG tablet Take 10 mg by mouth       benzonatate (TESSALON) 100 MG capsule Take 100 mg by mouth       loratadine (CLARITIN) 10 MG tablet Take 10 mg by mouth       nystatin (MYCOSTATIN) 463198 UNIT/GM POWD Apply 1 Application topically       DOCOSAHEXAENOIC ACID PO Take 1 g  by mouth       polyethylene glycol (MIRALAX/GLYCOLAX) Packet Take 17 g by mouth       oxyCODONE (ROXICODONE) 5 MG IR tablet Take 1 tablet (5 mg) by mouth every 4 hours as needed for moderate to severe pain 30 tablet 0     cholecalciferol (VITAMIN D3) 1000 UNIT tablet Take 1,000 Units by mouth daily       Glatiramer Acetate (COPAXONE) 40 MG/ML SOSY Inject 1 mL Subcutaneous three times a week Every Tuesday, Thursday and Saturday       fluticasone (FLONASE) 50 MCG/ACT spray Spray 1 spray into both nostrils daily as needed for rhinitis or allergies       nystatin-triamcinolone (MYCOLOG II) cream Apply topically 2 times daily as needed       Omega-3 Fatty Acids (OMEGA-3 PO) Take 300-1,000 mg by mouth daily       Pollen Extracts (PROSTAT PO) Take by mouth 2 times daily 15gm/1oz       collagenase ointment Apply topically as needed (Wound care for pressure ulcers)       acetaminophen (TYLENOL) 325 MG tablet Take 650 mg by mouth every 4 hours as needed for mild pain       OMEPRAZOLE PO Take 20 mg by mouth 2 times daily (before meals)        ferrous sulfate (IRON) 325 (65 FE) MG tablet Take 325 mg by mouth daily (with breakfast)       traMADol (ULTRAM) 50 MG tablet Take 1 tablet (50 mg) by mouth every 6 hours as needed 28 tablet      gabapentin (NEURONTIN) 100 MG capsule Take 1-2 capsules (100-200 mg) by mouth 3 times daily Take 100 mg (1 capsule) by mouth in the morning, 100 mg at noon, 100 mg in the afternoon, and 200 mg (2 capsules) at bedtimeSee Admin Instructions Take 100 mg (1 capsule) by mouth in the morning, 100 mg at noon, 100 mg in the afternoon, and 200 mg (2 capsules) at bedtime 90 capsule 0     PARoxetine (PAXIL-CR) 25 MG 24 hr tablet Take 1 tablet (25 mg) by mouth every morning 30 tablet      atenolol (TENORMIN) 25 MG tablet Take 1 tablet (25 mg) by mouth 2 times daily 30 tablet      calcium carbonate (OS-EVELINE 500 MG [place]. [place]) 500 MG tablet Take 1 tablet (500 mg) by mouth daily 90 tablet      multivitamin,  therapeutic with minerals (THERA-VIT-M) TABS Take 1 tablet by mouth daily 30 each 0     baclofen (LIORESAL) 20 MG tablet Take 0.5 tablets (10 mg) by mouth 4 times daily as needed for muscle spasms (Patient taking differently: Take 10 mg by mouth 2 times daily Taken Every AM and PM. Also given up to three times throughout the day PRN) 120 tablet 3     methyl salicylate-menthol (BENGAY) OINT Apply 2 g topically every 6 hours as needed (pain). 1 Tube 2       INTERIM SOCIAL HISTORY:  Social situation has not changed since the last visit.  She is unemployed.  She is living in a nursing home [facility]. States she would like to return to her condo.      PHYSICAL EXAMINATION:  Interrogation of the Baclofen  pump shows that it is currently running at a Simple Continuous dose of 27.0 mcg/day. She also has a Flex (complex continuous) dose of 35.0 mcg/hour over 5 hours; 35.4 mcg/hour over 5 hours for total dose of 730.4 mcg/day.  Pump site is intact with no signs of infection, redness, swelling or seroma.      Clonus is not present.      Musculoskeletal contractures are present in the knees and ankles. She has bilateral knee flexion contractures with bilateral plantar flexion contractures, and her right ankle has an inversion flexion contracture.      Gait examination showed non-ambulatory.      Upper extremity motor control:  Unchanged from previous exam.      Lower extremity motor control/gait:  Unchanged from previous exam.      Oral motor control/speech:  Is optimized      Pain:  Is under adequate control.     Psychosocial: Somewhat lethargic today but lungs clear.     IMPRESSION/PLAN:  Visit Reason: Pump Refill  Refill Amount: 20 mL  NDC #: 2000 (25262-305-12)  Sterile Prep & Drape: Yes  Mililiters Withdrawn: 3.6  Mililiters Expected: 3.8  New Dose: 27.0 mcg/day, with flex dosing same as above  Alarm Date: 11/10/2017  Replacement Interval: 40 months  Spasticity: Optimal  Therapeutic Level: Optimal      Next  pump refill appointment 11/8/2017      Niru Yun RN  Under the supervision of  Gigi Bassett MD  Department of Physical Medicine and Rehabilitation

## 2017-09-22 NOTE — MR AVS SNAPSHOT
After Visit Summary   9/22/2017    Aga Fraga    MRN: [id number]           Patient Information     Date Of Birth          1950        Visit Information        Provider Department      9/22/2017 11:00 AM Nurse, Malcolm Pmr [facility] Physical Medicine and Rehabilitation        Today's Diagnoses     Multiple sclerosis (H)    -  1    Spasm of muscle        Presence of implanted infusion pump           Follow-ups after your visit        Follow-up notes from your care team     Return in about 7 weeks (around 11/8/2017) for Baclofen Pump refill.      Your next 10 appointments already scheduled     Sep 25, 2017  9:00 AM CDT   (Arrive by 8:45 AM)   Return Visit with Phil Ruelas MD   [facility] Urology and [facility] ([facility])    [address]  4th Floor  [place] [address] [phone]            Nov 08, 2017  1:00 PM CST   (Arrive by 12:45 PM)   Return Visit with Malcolm Pmr Nurse   [facility] Physical Medicine and Rehabilitation ([facility])    [address]  3rd Floor  [place] [address] [phone]              Who to contact     Please call your clinic at [phone] to:    Ask questions about your health    Make or cancel appointments    Discuss your medicines    Learn about your test results    Speak to your doctor   If you have compliments or concerns about an experience at your clinic, or if you wish to file a complaint, please contact [facility] Patient Relations at [phone] or email us at [email or web address]         Additional Information About Your Visit        MyChart Information     [employer] is an electronic gateway that provides easy, online access to your medical records. With [employer], you can request a clinic appointment, read your test results, renew a prescription or communicate with your care team.     To sign up for MyChart visit  the website at [email or web address]   You will be asked to enter the access code listed below, as well as some personal information. Please follow the directions to create your username and password.     Your access code is: [email or web address]  Expires: 10/22/2017  6:30 AM     Your access code will  in 90 days. If you need help or a new code, please contact your [facility] or call [phone] for assistance.        Care EveryWhere ID     This is your Care EveryWhere ID. This could be used by other organizations to access your [facility] medical records  [id number]         Blood Pressure from Last 3 Encounters:   17 120/66   17 122/54   17 134/63    Weight from Last 3 Encounters:   17 63.5 kg (140 lb)   17 63 kg (139 lb)   17 63 kg (139 lb)              Today, you had the following     No orders found for display         Today's Medication Changes          These changes are accurate as of: 17 12:13 PM.  If you have any questions, ask your nurse or doctor.               These medicines have changed or have updated prescriptions.        Dose/Directions    baclofen 20 MG tablet   Commonly known as:  LIORESAL   This may have changed:    - when to take this  - additional instructions   Used for:  Multiple sclerosis (H), Muscle spasm        Dose:  10 mg   Take 0.5 tablets (10 mg) by mouth 4 times daily as needed for muscle spasms   Quantity:  120 tablet   Refills:  3                Primary Care Provider Office Phone # Fax #    Astrid RUTH Cara [phone]       [facility] AFTER HOURS [address] [place] [address]        Equal Access to Services     [facility] CenterJAMES : Hadjesse Cardona, diane wright, qacarmen sims. So [facility] [phone].    ATENCIÓN: Si habla español, tiene a urena disposición servicios gratuitos de asistencia lingüística. Llame al  [phone].    We comply with applicable federal civil rights laws and [place] laws. We do not discriminate on the basis of race, color, national origin, age, disability sex, sexual orientation or gender identity.            Thank you!     Thank you for choosing [facility] PHYSICAL MEDICINE AND REHABILITATION  for your care. Our goal is always to provide you with excellent care. Hearing back from our patients is one way we can continue to improve our services. Please take a few minutes to complete the written survey that you may receive in the mail after your visit with us. Thank you!             Your Updated Medication List - Protect others around you: Learn how to safely use, store and throw away your medicines at www.disposemymeds.org.          This list is accurate as of: 9/22/17 12:13 PM.  Always use your most recent med list.                   Brand Name Dispense Instructions for use Diagnosis    aspirin 81 MG tablet      Take 81 mg by mouth        atenolol 25 MG tablet    TENORMIN    30 tablet    Take 1 tablet (25 mg) by mouth 2 times daily    HOCM (hypertrophic obstructive cardiomyopathy) (H)       baclofen 20 MG tablet    LIORESAL    120 tablet    Take 0.5 tablets (10 mg) by mouth 4 times daily as needed for muscle spasms    Multiple sclerosis (H), Muscle spasm       benzonatate 100 MG capsule    TESSALON     Take 100 mg by mouth        calcium carbonate 1250 MG tablet    OS-EVELINE 500 mg [place]. [place]    90 tablet    Take 1 tablet (500 mg) by mouth daily    MS (multiple sclerosis) (H)       celecoxib 100 MG capsule    celeBREX     Take 100 mg by mouth        cetirizine 10 MG tablet    zyrTEC     Take 10 mg by mouth        cholecalciferol 1000 UNIT tablet    vitamin D     Take 1,000 Units by mouth daily        collagenase ointment      Apply topically as needed (Wound care for pressure ulcers)        COPAXONE 40 MG/ML Sosy   Generic drug:  Glatiramer Acetate      Inject 1 mL Subcutaneous three times a week Every  Tuesday, Thursday and Saturday        DOCOSAHEXAENOIC ACID PO      Take 1 g by mouth        ferrous sulfate 325 (65 FE) MG tablet    IRON     Take 325 mg by mouth daily (with breakfast)        fluticasone 50 MCG/ACT spray    FLONASE     Spray 1 spray into both nostrils daily as needed for rhinitis or allergies        gabapentin 100 MG capsule    NEURONTIN    90 capsule    Take 1-2 capsules (100-200 mg) by mouth 3 times daily Take 100 mg (1 capsule) by mouth in the morning, 100 mg at noon, 100 mg in the afternoon, and 200 mg (2 capsules) at bedtimeSee Admin Instructions Take 100 mg (1 capsule) by mouth in the morning, 100 mg at noon, 100 mg in the afternoon, and 200 mg (2 capsules) at bedtime    Spasm of muscle       loratadine 10 MG tablet    CLARITIN     Take 10 mg by mouth        methyl salicylate-menthol Oint     1 Tube    Apply 2 g topically every 6 hours as needed (pain).    Multiple scleroses       multivitamin, therapeutic with minerals Tabs tablet     30 each    Take 1 tablet by mouth daily    MS (multiple sclerosis) (H)       nystatin [id number] UNIT/GM Powd    MYCOSTATIN     Apply 1 Application topically        nystatin-triamcinolone cream    MYCOLOG II     Apply topically 2 times daily as needed        OMEGA-3 PO      Take 300-1,000 mg by mouth daily        OMEPRAZOLE PO      Take 20 mg by mouth 2 times daily (before meals)        oxyCODONE 5 MG IR tablet    ROXICODONE    30 tablet    Take 1 tablet (5 mg) by mouth every 4 hours as needed for moderate to severe pain    Kidney stones       PARoxetine 25 MG 24 hr tablet    PAXIL-CR    30 tablet    Take 1 tablet (25 mg) by mouth every morning    Episode of recurrent major depressive disorder, unspecified depression episode severity (H)       polyethylene glycol Packet    MIRALAX/GLYCOLAX     Take 17 g by mouth        PROSTAT PO      Take by mouth 2 times daily 15gm/1oz        traMADol 50 MG tablet    ULTRAM    28 tablet    Take 1 tablet (50 mg) by mouth every 6  hours as needed    Spasm of muscle       TYLENOL 325 MG tablet   Generic drug:  acetaminophen      Take 650 mg by mouth every 4 hours as needed for mild pain

## 2017-09-25 ENCOUNTER — OFFICE VISIT (OUTPATIENT)
Dept: UROLOGY | Facility: CLINIC | Age: 67
End: 2017-09-25

## 2017-09-25 VITALS
SYSTOLIC BLOOD PRESSURE: 108 MMHG | DIASTOLIC BLOOD PRESSURE: 54 MMHG | HEART RATE: 67 BPM | WEIGHT: 138 LBS | BODY MASS INDEX: 22.18 KG/M2 | HEIGHT: 66 IN

## 2017-09-25 DIAGNOSIS — N39.45 CONTINUOUS LEAKAGE OF URINE: ICD-10-CM

## 2017-09-25 DIAGNOSIS — N31.9 NEUROGENIC BLADDER: Primary | ICD-10-CM

## 2017-09-25 DIAGNOSIS — G35 MS (MULTIPLE SCLEROSIS) (H): ICD-10-CM

## 2017-09-25 ASSESSMENT — PAIN SCALES - GENERAL: PAINLEVEL: NO PAIN (0)

## 2017-09-25 NOTE — PROGRESS NOTES
Aga Fraga is a 67-year-old woman with multiple sclerosis and a Mitrofanoff created in 2000. This was later converted to a Alli channel in the same year for stenosis. She was bothered by incontinence and so urodynamics were done in September 2016. This showed no detrusor overactivity and there was leakage with Valsalva up to only 30 cm of water at low bladder volumes of about 100 cc. She underwent Deflux injection into the channel in October 2016 and had significant improvement in her symptoms but now her incontinence has returned.    Of note she also developed staghorn calculus last year and this was treated with a few surgeries by Dr. Hyde. Her most recent renal ultrasound was done in April 2017 and showed no residual stones. There is also no hydronephrosis and a normal-appearing bladder.      Her degree of leakage is currently minimal and is managed with 1-2 Kotex pads per day. I explained to her that we want to avoid over-injecting because of the possibility of the material migrating and she agrees that for now she can manage the leakage and we can delay additional injections until it becomes more bothersome. She'll follow up with Lynn in 6 months with a renal bladder ultrasound and a creatinine and can see me as needed for additional injections of Deflux into her Alli channel.    As the attending surgeon, I, Phil Ruelas, spent 15 minutes with the patient with >50% in consultation and coordination of care.

## 2017-09-25 NOTE — MR AVS SNAPSHOT
After Visit Summary   9/25/2017    Aga Fraga    MRN: [id number]           Patient Information     Date Of Birth          1950        Visit Information        Provider Department      9/25/2017 9:00 AM Phil Ruelas MD [facility] Urology and [facility] for Prostate and Urologic Cancers        Today's Diagnoses     Neurogenic bladder    -  1    MS (multiple sclerosis) (H)        Continuous leakage of urine          Care Instructions    Follow up with Lynn Villar PA-C in 6 months with a renal US and labs.              Follow-ups after your visit        Your next 10 appointments already scheduled     Nov 08, 2017  1:00 PM CST   (Arrive by 12:45 PM)   Return Visit with  Pmr Nurse   [facility] Physical Medicine and Rehabilitation ([facility])    [address] [place] [id number] [phone]            Mar 26, 2018 11:00 AM CDT   LAB with  LAB   [facility] Lab ([facility])    [address] [place] [address] [phone]           Patient must bring picture ID. Patient should be prepared to give a urine specimen  Please do not eat 10-12 hours before your appointment if you are coming in fasting for labs on lipids, cholesterol, or glucose (sugar). Pregnant women should follow their Care Team instructions. Water with medications is okay. Do not drink coffee or other fluids. If you have concerns about taking  your medications, please ask at office or if scheduling via [employer], send a message by clicking on Secure Messaging, Message Your Care Team.            Mar 26, 2018 11:15 AM CDT   US RENAL COMPLETE with UCUS2   [facility] US ([facility])    [address] [place] [id number] [phone]           Please bring a list of your medicines (including vitamins, minerals and over-the-counter drugs). Also, tell your doctor about any  allergies you may have. Wear comfortable clothes and leave your valuables at home.  You do not need to do anything special to prepare for your exam.  Please call the Imaging Department at your exam site with any questions.            Mar 26, 2018 12:30 PM CDT   (Arrive by 12:15 PM)   Return Visit with Lynn Lewis PA-C   [facility] Urology and [facility] for Prostate and Urologic Cancers ([facility])    [address] [facility] [address] [phone]              Future tests that were ordered for you today     Open Future Orders        Priority Expected Expires Ordered    Renal US Routine  2018    Comprehensive metabolic panel Routine  2018            Who to contact     Please call your clinic at [phone] to:    Ask questions about your health    Make or cancel appointments    Discuss your medicines    Learn about your test results    Speak to your doctor   If you have compliments or concerns about an experience at your clinic, or if you wish to file a complaint, please contact [facility] Patient Relations at [phone] or email us at [email or web address]         Additional Information About Your Visit        MyChart Information     Topm[employer]hart is an electronic gateway that provides easy, online access to your medical records. With seedchange, you can request a clinic appointment, read your test results, renew a prescription or communicate with your care team.     To sign up for Graph[employer] visit the website at [email or web address]   You will be asked to enter the access code listed below, as well as some personal information. Please follow the directions to create your username and password.     Your access code is: [email or web address]  Expires: 10/22/2017  6:30 AM     Your access code will  in 90 days. If you need help or a new code, please contact your [facility]  "or call [phone] for assistance.        Care EveryWhere ID     This is your Care EveryWhere ID. This could be used by other organizations to access your [place] medical records  [id number]        Your Vitals Were     Pulse Height BMI (Body Mass Index)             67 1.664 m (5' 5.5\") 22.62 kg/m2          Blood Pressure from Last 3 Encounters:   09/25/17 108/54   08/22/17 120/66   04/17/17 122/54    Weight from Last 3 Encounters:   09/25/17 62.6 kg (138 lb)   08/22/17 63.5 kg (140 lb)   04/17/17 63 kg (139 lb)                 Today's Medication Changes          These changes are accurate as of: 9/25/17 10:12 AM.  If you have any questions, ask your nurse or doctor.               These medicines have changed or have updated prescriptions.        Dose/Directions    baclofen 20 MG tablet   Commonly known as:  LIORESAL   This may have changed:    - when to take this  - additional instructions   Used for:  Multiple sclerosis (H), Muscle spasm        Dose:  10 mg   Take 0.5 tablets (10 mg) by mouth 4 times daily as needed for muscle spasms   Quantity:  120 tablet   Refills:  3                Primary Care Provider Office Phone # Fax #    Astrid RUTH Cara [phone]       [facility] AFTER HOURS [address] [place] [address]        Equal Access to Services     NOE ACE AH: Hadii rad geiger hadasho Soyuli, waaxda luqadaha, qaybta kaalmada annelise, carmen frankel. So [facility] [phone].    ATENCIÓN: Si habla español, tiene a urena disposición servicios gratuitos de asistencia lingüística. William al [phone].    We comply with applicable federal civil rights laws and [place] laws. We do not discriminate on the basis of race, color, national origin, age, disability sex, sexual orientation or gender identity.            Thank you!     Thank you for choosing [facility] UROLOGY AND [facility] FOR PROSTATE AND UROLOGIC CANCERS  for your care. Our goal is always to provide you " with excellent care. Hearing back from our patients is one way we can continue to improve our services. Please take a few minutes to complete the written survey that you may receive in the mail after your visit with us. Thank you!             Your Updated Medication List - Protect others around you: Learn how to safely use, store and throw away your medicines at www.disposemymeds.org.          This list is accurate as of: 9/25/17 10:12 AM.  Always use your most recent med list.                   Brand Name Dispense Instructions for use Diagnosis    aspirin 81 MG tablet      Take 81 mg by mouth        atenolol 25 MG tablet    TENORMIN    30 tablet    Take 1 tablet (25 mg) by mouth 2 times daily    HOCM (hypertrophic obstructive cardiomyopathy) (H)       baclofen 20 MG tablet    LIORESAL    120 tablet    Take 0.5 tablets (10 mg) by mouth 4 times daily as needed for muscle spasms    Multiple sclerosis (H), Muscle spasm       benzonatate 100 MG capsule    TESSALON     Take 100 mg by mouth        calcium carbonate 1250 MG tablet    OS-EVELINE 500 mg [place]    90 tablet    Take 1 tablet (500 mg) by mouth daily    MS (multiple sclerosis) (H)       celecoxib 100 MG capsule    celeBREX     Take 100 mg by mouth        cetirizine 10 MG tablet    zyrTEC     Take 10 mg by mouth        cholecalciferol 1000 UNIT tablet    vitamin D     Take 1,000 Units by mouth daily        collagenase ointment      Apply topically as needed (Wound care for pressure ulcers)        COPAXONE 40 MG/ML Sosy   Generic drug:  Glatiramer Acetate      Inject 1 mL Subcutaneous three times a week Every Tuesday, Thursday and Saturday        DOCOSAHEXAENOIC ACID PO      Take 1 g by mouth        ferrous sulfate 325 (65 FE) MG tablet    IRON     Take 325 mg by mouth daily (with breakfast)        fluticasone 50 MCG/ACT spray    FLONASE     Spray 1 spray into both nostrils daily as needed for rhinitis or allergies        gabapentin 100 MG capsule    NEURONTIN    90  capsule    Take 1-2 capsules (100-200 mg) by mouth 3 times daily Take 100 mg (1 capsule) by mouth in the morning, 100 mg at noon, 100 mg in the afternoon, and 200 mg (2 capsules) at bedtimeSee Admin Instructions Take 100 mg (1 capsule) by mouth in the morning, 100 mg at noon, 100 mg in the afternoon, and 200 mg (2 capsules) at bedtime    Spasm of muscle       loratadine 10 MG tablet    CLARITIN     Take 10 mg by mouth        methyl salicylate-menthol Oint     1 Tube    Apply 2 g topically every 6 hours as needed (pain).    Multiple scleroses       multivitamin, therapeutic with minerals Tabs tablet     30 each    Take 1 tablet by mouth daily    MS (multiple sclerosis) (H)       nystatin 197144 UNIT/GM Powd    MYCOSTATIN     Apply 1 Application topically        nystatin-triamcinolone cream    MYCOLOG II     Apply topically 2 times daily as needed        OMEGA-3 PO      Take 300-1,000 mg by mouth daily        OMEPRAZOLE PO      Take 20 mg by mouth 2 times daily (before meals)        oxyCODONE 5 MG IR tablet    ROXICODONE    30 tablet    Take 1 tablet (5 mg) by mouth every 4 hours as needed for moderate to severe pain    Kidney stones       PARoxetine 25 MG 24 hr tablet    PAXIL-CR    30 tablet    Take 1 tablet (25 mg) by mouth every morning    Episode of recurrent major depressive disorder, unspecified depression episode severity (H)       polyethylene glycol Packet    MIRALAX/GLYCOLAX     Take 17 g by mouth        PROSTAT PO      Take by mouth 2 times daily 15gm/1oz        traMADol 50 MG tablet    ULTRAM    28 tablet    Take 1 tablet (50 mg) by mouth every 6 hours as needed    Spasm of muscle       TYLENOL 325 MG tablet   Generic drug:  acetaminophen      Take 650 mg by mouth every 4 hours as needed for mild pain

## 2017-09-25 NOTE — LETTER
9/25/2017       RE: Aga Fraga  [address]  [place] [address]     Dear Colleague,    Thank you for referring your patient, Aga Fraga, to the [facility] UROLOGY AND INST FOR PROSTATE AND UROLOGIC CANCERS at [facility]. Please see a copy of my visit note below.    Aga Fraga is a 67-year-old woman with multiple sclerosis and a Mitrofanoff created in 2000. This was later converted to a Alli channel in the same year for stenosis. She was bothered by incontinence and so urodynamics were done in September 2016. This showed no detrusor overactivity and there was leakage with Valsalva up to only 30 cm of water at low bladder volumes of about 100 cc. She underwent Deflux injection into the channel in October 2016 and had significant improvement in her symptoms but now her incontinence has returned.    Of note she also developed staghorn calculus last year and this was treated with a few surgeries by Dr. Hyde. Her most recent renal ultrasound was done in April 2017 and showed no residual stones. There is also no hydronephrosis and a normal-appearing bladder.      Her degree of leakage is currently minimal and is managed with 1-2 Kotex pads per day. I explained to her that we want to avoid over-injecting because of the possibility of the material migrating and she agrees that for now she can manage the leakage and we can delay additional injections until it becomes more bothersome. She'll follow up with Lynn in 6 months with a renal bladder ultrasound and a creatinine and can see me as needed for additional injections of Deflux into her Alli channel.    As the attending surgeon, I, Phil Ruelas, spent 15 minutes with the patient with >50% in consultation and coordination of care.        Again, thank you for allowing me to participate in the care of your patient.      Sincerely,    Phil Ruelas MD

## 2017-09-25 NOTE — NURSING NOTE
Chief Complaint   Patient presents with     RECHECK     Patient with history of NGB coming in for consideration of Deflux into channel.      Irma Xiong

## 2017-09-30 ENCOUNTER — OFFICE VISIT - HEALTHEAST (OUTPATIENT)
Dept: GERIATRICS | Facility: CLINIC | Age: 67
End: 2017-09-30

## 2017-09-30 DIAGNOSIS — N31.9 NEUROGENIC BLADDER: ICD-10-CM

## 2017-09-30 DIAGNOSIS — J96.10 CHRONIC RESPIRATORY FAILURE (H): ICD-10-CM

## 2017-09-30 DIAGNOSIS — I42.9 CARDIOMYOPATHY (H): ICD-10-CM

## 2017-09-30 DIAGNOSIS — G35 MS (MULTIPLE SCLEROSIS) (H): ICD-10-CM

## 2017-09-30 DIAGNOSIS — I10 ESSENTIAL HYPERTENSION: ICD-10-CM

## 2017-10-05 ENCOUNTER — OFFICE VISIT - HEALTHEAST (OUTPATIENT)
Dept: GERIATRICS | Facility: CLINIC | Age: 67
End: 2017-10-05

## 2017-10-05 DIAGNOSIS — I42.2 HYPERTROPHIC CARDIOMYOPATHY (H): ICD-10-CM

## 2017-10-05 DIAGNOSIS — M62.838 MUSCLE SPASMS OF BOTH LOWER EXTREMITIES: ICD-10-CM

## 2017-10-05 DIAGNOSIS — N31.9 NEUROGENIC BLADDER: ICD-10-CM

## 2017-10-05 DIAGNOSIS — G35 MULTIPLE SCLEROSIS (H): ICD-10-CM

## 2017-10-05 DIAGNOSIS — F32.A DEPRESSION, UNSPECIFIED DEPRESSION TYPE: ICD-10-CM

## 2017-10-05 DIAGNOSIS — R05.3 CHRONIC COUGH: ICD-10-CM

## 2017-10-05 DIAGNOSIS — G89.4 CHRONIC PAIN SYNDROME: ICD-10-CM

## 2017-10-05 ASSESSMENT — MIFFLIN-ST. JEOR: SCORE: 1107.37

## 2017-10-09 ENCOUNTER — AMBULATORY - HEALTHEAST (OUTPATIENT)
Dept: GERIATRICS | Facility: CLINIC | Age: 67
End: 2017-10-09

## 2017-10-11 ENCOUNTER — COMMUNICATION - HEALTHEAST (OUTPATIENT)
Dept: GERIATRICS | Facility: CLINIC | Age: 67
End: 2017-10-11

## 2017-10-11 DIAGNOSIS — R05.9 COUGH: ICD-10-CM

## 2017-11-08 ENCOUNTER — ALLIED HEALTH/NURSE VISIT (OUTPATIENT)
Dept: PHYSICAL MEDICINE AND REHAB | Facility: CLINIC | Age: 67
End: 2017-11-08

## 2017-11-08 DIAGNOSIS — Z97.8 PRESENCE OF INTRATHECAL BACLOFEN PUMP: Primary | ICD-10-CM

## 2017-11-08 DIAGNOSIS — M62.838 SPASM OF MUSCLE: ICD-10-CM

## 2017-11-08 NOTE — PROGRESS NOTES
Intrathecal Pump Clinic Note    Aga Fraga is being seen in the Intrathecal Pump Clinic for a pump refill for Spasticity secondary to MS.      VERIFICATION OF PATIENT IDENTIFICATION AND PROCEDURE     Initials   Patient Name pag   Patient  pag   Procedure Verified by: pag     Prior to the start of the procedure and with procedural staff participation, I verbally confirmed the patient s identity using two indicators, relevant allergies, that the procedure was appropriate and matched the consent or emergent situation, and that the correct equipment/implants were available. Immediately prior to starting the procedure I conducted the Time Out with the procedural staff and re-confirmed the patient s name, procedure, and site/side. (The Joint Commission universal protocol was followed.)  Yes      INTERIM HISTORY:  Aga has been doing well since our last visit, although has a noisy cough today.    INTERIM PAST MEDICAL HISTORY:  There have been other physician visits since our last appointment.  She has been seen by Urology for mild leaking in her mitrofanoff.     She IS NOT currently involved in therapy.  She is also working on ADL's for a home program.  She has made functional gains in .    REVIEW OF SYSTEMS:  She has pain managed by her baclofen pump and oral medications  She denies having bladder issues  She denies having issues with her bowels.  Patient denies side effects from the intrathecal Baclofen.    Current Outpatient Prescriptions   Medication Sig Dispense Refill     aspirin 81 MG tablet Take 81 mg by mouth       celecoxib (CELEBREX) 100 MG capsule Take 100 mg by mouth       cetirizine (ZYRTEC) 10 MG tablet Take 10 mg by mouth       benzonatate (TESSALON) 100 MG capsule Take 100 mg by mouth       loratadine (CLARITIN) 10 MG tablet Take 10 mg by mouth       nystatin (MYCOSTATIN) 018486 UNIT/GM POWD Apply 1 Application topically       DOCOSAHEXAENOIC ACID PO Take 1 g by mouth       polyethylene  glycol (MIRALAX/GLYCOLAX) Packet Take 17 g by mouth       oxyCODONE (ROXICODONE) 5 MG IR tablet Take 1 tablet (5 mg) by mouth every 4 hours as needed for moderate to severe pain 30 tablet 0     cholecalciferol (VITAMIN D3) 1000 UNIT tablet Take 1,000 Units by mouth daily       Glatiramer Acetate (COPAXONE) 40 MG/ML SOSY Inject 1 mL Subcutaneous three times a week Every Tuesday, Thursday and Saturday       fluticasone (FLONASE) 50 MCG/ACT spray Spray 1 spray into both nostrils daily as needed for rhinitis or allergies       nystatin-triamcinolone (MYCOLOG II) cream Apply topically 2 times daily as needed       Omega-3 Fatty Acids (OMEGA-3 PO) Take 300-1,000 mg by mouth daily       Pollen Extracts (PROSTAT PO) Take by mouth 2 times daily 15gm/1oz       collagenase ointment Apply topically as needed (Wound care for pressure ulcers)       acetaminophen (TYLENOL) 325 MG tablet Take 650 mg by mouth every 4 hours as needed for mild pain       OMEPRAZOLE PO Take 20 mg by mouth 2 times daily (before meals)        ferrous sulfate (IRON) 325 (65 FE) MG tablet Take 325 mg by mouth daily (with breakfast)       traMADol (ULTRAM) 50 MG tablet Take 1 tablet (50 mg) by mouth every 6 hours as needed 28 tablet      gabapentin (NEURONTIN) 100 MG capsule Take 1-2 capsules (100-200 mg) by mouth 3 times daily Take 100 mg (1 capsule) by mouth in the morning, 100 mg at noon, 100 mg in the afternoon, and 200 mg (2 capsules) at bedtimeSee Admin Instructions Take 100 mg (1 capsule) by mouth in the morning, 100 mg at noon, 100 mg in the afternoon, and 200 mg (2 capsules) at bedtime 90 capsule 0     PARoxetine (PAXIL-CR) 25 MG 24 hr tablet Take 1 tablet (25 mg) by mouth every morning 30 tablet      atenolol (TENORMIN) 25 MG tablet Take 1 tablet (25 mg) by mouth 2 times daily 30 tablet      calcium carbonate (OS-EVELINE 500 MG [place]) 500 MG tablet Take 1 tablet (500 mg) by mouth daily 90 tablet      multivitamin, therapeutic with minerals  (THERA-VIT-M) TABS Take 1 tablet by mouth daily 30 each 0     baclofen (LIORESAL) 20 MG tablet Take 0.5 tablets (10 mg) by mouth 4 times daily as needed for muscle spasms (Patient taking differently: Take 10 mg by mouth 2 times daily Taken Every AM and PM. Also given up to three times throughout the day PRN) 120 tablet 3     methyl salicylate-menthol (BENGAY) OINT Apply 2 g topically every 6 hours as needed (pain). 1 Tube 2       INTERIM SOCIAL HISTORY:  Social situation has not changed since the last visit.  She is unemployed.  She is living in a nursing home, ([facility]).    PHYSICAL EXAMINATION:  Interrogation of the Baclofen  pump shows that it is currently running at a Simple Continuous dose of 27.0 mcg/day. She also has a Flex (complex continuous) dose of 35.0 mcg/hour over 5 hours; 35.4 mcg/hour over 5 hours for total dose of 730.4 mcg/day.  Pump site is intact with no signs of infection, redness, swelling or seroma.    Clonus is not present.    Musculoskeletal contractures are present in the knees and ankles. She has bilateral knee flexion contractures with bilateral plantar flexion contractures, and her right ankle has an inversion flexion contracture.    Gait examination showed non-ambulatory.    Upper extremity motor control:  Unchanged from previous exam.    Lower extremity motor control/gait:  Unchanged from previous exam.    Oral motor control/speech:  Is optimized    Pain:  Is under adequate control.    Psychosocial:  Status is unchanged from the previous visit..    IMPRESSION/PLAN:    Visit Reason: Pump Refill  NDC #: 2000 (20433-415-92)  Sterile Prep & Drape: Yes  Mililiters Withdrawn: 3.5  Mililiters Expected: 2.8  Previous Dose: simple continous 27.0 mcg/day, with flex dosing to equal 730.4 mcg/day  New Dose: simple continuous 27.0 mcg/day, with flex dosing to equal 730.4 mcg/day  Alarm Date: 12/27/17  Spasticity: Optimal  Therapeutic Level: Optimal  Symptoms: none noted   Estimated  LES: 38 months    Next pump refill appointment Wednesday, December 20, 2017 at 1:00PM    KASSY Bergman, Care Coordinator  Under the supervision of  Gigi Bassett MD  Department of Physical Medicine and Rehabilitation

## 2017-11-08 NOTE — MR AVS SNAPSHOT
After Visit Summary   11/8/2017    Aga Fraga    MRN: [id number]           Patient Information     Date Of Birth          1950        Visit Information        Provider Department      11/8/2017 1:00 PM Nurse, Malcolm Ruby [facility] Physical Medicine and Rehabilitation         Follow-ups after your visit        Your next 10 appointments already scheduled     Dec 20, 2017  1:00 PM CST   (Arrive by 12:45 PM)   Return Visit with  Pmr Nurse   [facility] Physical Medicine and Rehabilitation ([facility])    [address] [place] [address]  3rd [facility] [id number] [phone]            Mar 26, 2018 11:00 AM CDT   LAB with  LAB   [facility] Lab ([facility])    [address] [place] [id number] [phone]           Please do not eat 10-12 hours before your appointment if you are coming in fasting for labs on lipids, cholesterol, or glucose (sugar). This does not apply to pregnant women. Water, hot tea and black coffee (with nothing added) are okay. Do not drink other fluids, diet soda or chew gum.            Mar 26, 2018 11:15 AM CDT   US RENAL COMPLETE with UCUS2   [facility] US ([facility])    [address] [place] [address] [phone]           Please bring a list of your medicines (including vitamins, minerals and over-the-counter drugs). Also, tell your doctor about any allergies you may have. Wear comfortable clothes and leave your valuables at home.  You do not need to do anything special to prepare for your exam.  Please call the Imaging Department at your exam site with any questions.            Mar 26, 2018 12:30 PM CDT   (Arrive by 12:15 PM)   Return Visit with Lynn Lewis PA-C   [facility] for Prostate and Urologic Cancers ([facility])    [address] [place] [id number]    [phone]              Who to contact     Please call your clinic at [phone] to:    Ask questions about your health    Make or cancel appointments    Discuss your medicines    Learn about your test results    Speak to your doctor   If you have compliments or concerns about an experience at your clinic, or if you wish to file a complaint, please contact [facility] Patient Relations at [phone] or email us at [email or web address]         Additional Information About Your Visit        in3DgalleryharRedgage Information     QCoefficient is an electronic gateway that provides easy, online access to your medical records. With QCoefficient, you can request a clinic appointment, read your test results, renew a prescription or communicate with your care team.     To sign up for QCoefficient visit the website at [email or web address]   You will be asked to enter the access code listed below, as well as some personal information. Please follow the directions to create your username and password.     Your access code is: [email or web address]  Expires: 2018  5:30 AM     Your access code will  in 90 days. If you need help or a new code, please contact your [facility] or call [phone] for assistance.        Care EveryWhere ID     This is your Care EveryWhere ID. This could be used by other organizations to access your [facility] medical records  [id number]         Blood Pressure from Last 3 Encounters:   17 108/54   17 120/66   17 122/54    Weight from Last 3 Encounters:   17 62.6 kg (138 lb)   17 63.5 kg (140 lb)   17 63 kg (139 lb)              Today, you had the following     No orders found for display         Today's Medication Changes          These changes are accurate as of: 17  1:29 PM.  If you have any questions, ask your nurse or doctor.               These medicines have changed or have updated prescriptions.         Dose/Directions    baclofen 20 MG tablet   Commonly known as:  LIORESAL   This may have changed:    - when to take this  - additional instructions   Used for:  Multiple sclerosis (H), Muscle spasm        Dose:  10 mg   Take 0.5 tablets (10 mg) by mouth 4 times daily as needed for muscle spasms   Quantity:  120 tablet   Refills:  3                Primary Care Provider Office Phone # Fax #    Astrid Marroquin [phone]       [facility] AFTER HOURS [address] [place] [address]        Equal Access to Services     NOE ACE : Hadii aad ku hadasho Soomaali, waaxda luqadaha, qaybta kaalmada adeegyada, waxay idiin hayaan ademello leos . So [facility] [phone].    ATENCIÓN: Si habla español, tiene a urena disposición servicios gratuitos de asistencia lingüística. [facility] [phone].    We comply with applicable federal civil rights laws and [place] laws. We do not discriminate on the basis of race, color, national origin, age, disability, sex, sexual orientation, or gender identity.            Thank you!     Thank you for choosing [facility] PHYSICAL MEDICINE AND REHABILITATION  for your care. Our goal is always to provide you with excellent care. Hearing back from our patients is one way we can continue to improve our services. Please take a few minutes to complete the written survey that you may receive in the mail after your visit with us. Thank you!             Your Updated Medication List - Protect others around you: Learn how to safely use, store and throw away your medicines at www.disposemymeds.org.          This list is accurate as of: 11/8/17  1:29 PM.  Always use your most recent med list.                   Brand Name Dispense Instructions for use Diagnosis    aspirin 81 MG tablet      Take 81 mg by mouth        atenolol 25 MG tablet    TENORMIN    30 tablet    Take 1 tablet (25 mg) by mouth 2 times daily    HOCM (hypertrophic obstructive cardiomyopathy) (H)        baclofen 20 MG tablet    LIORESAL    120 tablet    Take 0.5 tablets (10 mg) by mouth 4 times daily as needed for muscle spasms    Multiple sclerosis (H), Muscle spasm       benzonatate 100 MG capsule    TESSALON     Take 100 mg by mouth        calcium carbonate 1250 MG tablet    OS-EVELINE 500 mg [place]. Ca    90 tablet    Take 1 tablet (500 mg) by mouth daily    MS (multiple sclerosis) (H)       celecoxib 100 MG capsule    celeBREX     Take 100 mg by mouth        cetirizine 10 MG tablet    zyrTEC     Take 10 mg by mouth        cholecalciferol 1000 UNIT tablet    vitamin D3     Take 1,000 Units by mouth daily        collagenase ointment      Apply topically as needed (Wound care for pressure ulcers)        COPAXONE 40 MG/ML Sosy   Generic drug:  Glatiramer Acetate      Inject 1 mL Subcutaneous three times a week Every Tuesday, Thursday and Saturday        DOCOSAHEXAENOIC ACID PO      Take 1 g by mouth        ferrous sulfate 325 (65 FE) MG tablet    IRON     Take 325 mg by mouth daily (with breakfast)        fluticasone 50 MCG/ACT spray    FLONASE     Spray 1 spray into both nostrils daily as needed for rhinitis or allergies        gabapentin 100 MG capsule    NEURONTIN    90 capsule    Take 1-2 capsules (100-200 mg) by mouth 3 times daily Take 100 mg (1 capsule) by mouth in the morning, 100 mg at noon, 100 mg in the afternoon, and 200 mg (2 capsules) at bedtimeSee Admin Instructions Take 100 mg (1 capsule) by mouth in the morning, 100 mg at noon, 100 mg in the afternoon, and 200 mg (2 capsules) at bedtime    Spasm of muscle       loratadine 10 MG tablet    CLARITIN     Take 10 mg by mouth        methyl salicylate-menthol Oint     1 Tube    Apply 2 g topically every 6 hours as needed (pain).    Multiple scleroses       multivitamin, therapeutic with minerals Tabs tablet     30 each    Take 1 tablet by mouth daily    MS (multiple sclerosis) (H)       nystatin 969763 UNIT/GM Powd    MYCOSTATIN     Apply 1 Application  topically        nystatin-triamcinolone cream    MYCOLOG II     Apply topically 2 times daily as needed        OMEGA-3 PO      Take 300-1,000 mg by mouth daily        OMEPRAZOLE PO      Take 20 mg by mouth 2 times daily (before meals)        oxyCODONE IR 5 MG tablet    ROXICODONE    30 tablet    Take 1 tablet (5 mg) by mouth every 4 hours as needed for moderate to severe pain    Kidney stones       PARoxetine 25 MG 24 hr tablet    PAXIL-CR    30 tablet    Take 1 tablet (25 mg) by mouth every morning    Episode of recurrent major depressive disorder, unspecified depression episode severity (H)       polyethylene glycol Packet    MIRALAX/GLYCOLAX     Take 17 g by mouth        PROSTAT PO      Take by mouth 2 times daily 15gm/1oz        traMADol 50 MG tablet    ULTRAM    28 tablet    Take 1 tablet (50 mg) by mouth every 6 hours as needed    Spasm of muscle       TYLENOL 325 MG tablet   Generic drug:  acetaminophen      Take 650 mg by mouth every 4 hours as needed for mild pain

## 2017-11-09 ENCOUNTER — OFFICE VISIT - HEALTHEAST (OUTPATIENT)
Dept: GERIATRICS | Facility: CLINIC | Age: 67
End: 2017-11-09

## 2017-11-09 DIAGNOSIS — N31.9 NEUROGENIC BLADDER: ICD-10-CM

## 2017-11-09 DIAGNOSIS — I42.2 HYPERTROPHIC CARDIOMYOPATHY (H): ICD-10-CM

## 2017-11-09 DIAGNOSIS — G89.4 CHRONIC PAIN SYNDROME: ICD-10-CM

## 2017-11-09 DIAGNOSIS — G35 MULTIPLE SCLEROSIS (H): ICD-10-CM

## 2017-11-09 DIAGNOSIS — F32.A DEPRESSION, UNSPECIFIED DEPRESSION TYPE: ICD-10-CM

## 2017-11-09 DIAGNOSIS — M62.838 MUSCLE SPASMS OF BOTH LOWER EXTREMITIES: ICD-10-CM

## 2017-11-09 DIAGNOSIS — J41.0: ICD-10-CM

## 2017-11-09 ASSESSMENT — MIFFLIN-ST. JEOR: SCORE: 1101.02

## 2017-12-20 NOTE — PROGRESS NOTES
Intrathecal Pump Clinic Note    Aga Fraga is being seen in the Intrathecal Baclofen Pump Clinic for a pump refill for Spasticity secondary to MS.      VERIFICATION OF PATIENT IDENTIFICATION AND PROCEDURE     Initials   Patient Name lmd   Patient  lmd   Procedure Verified by: lmd     Prior to the start of the procedure and with procedural staff participation, I verbally confirmed the patient s identity using two indicators, relevant allergies, that the procedure was appropriate and matched the consent or emergent situation, and that the correct equipment/implants were available. Immediately prior to starting the procedure I conducted the Time Out with the procedural staff and re-confirmed the patient s name, procedure, and site/side. (The Joint Commission universal protocol was followed.)  Yes      INTERIM HISTORY:  Aga has been doing well since our last visit.    INTERIM PAST MEDICAL HISTORY:  There have not been other physician visits since our last appointment.  She IS NOT currently involved in therapy.  She is also working on ADL's for a home program.  She has made functional gains in .    REVIEW OF SYSTEMS:  She has pain managed by her baclofen pump and oral medications  She denies having bladder issues  She denies having issues with her bowels.  Patient denies side effects from the intrathecal Baclofen.    Current Outpatient Prescriptions   Medication Sig Dispense Refill     aspirin 81 MG tablet Take 81 mg by mouth       celecoxib (CELEBREX) 100 MG capsule Take 100 mg by mouth       cetirizine (ZYRTEC) 10 MG tablet Take 10 mg by mouth       benzonatate (TESSALON) 100 MG capsule Take 100 mg by mouth       loratadine (CLARITIN) 10 MG tablet Take 10 mg by mouth       nystatin (MYCOSTATIN) 018999 UNIT/GM POWD Apply 1 Application topically       DOCOSAHEXAENOIC ACID PO Take 1 g by mouth       polyethylene glycol (MIRALAX/GLYCOLAX) Packet Take 17 g by mouth       oxyCODONE (ROXICODONE) 5 MG IR tablet  Take 1 tablet (5 mg) by mouth every 4 hours as needed for moderate to severe pain 30 tablet 0     cholecalciferol (VITAMIN D3) 1000 UNIT tablet Take 1,000 Units by mouth daily       Glatiramer Acetate (COPAXONE) 40 MG/ML SOSY Inject 1 mL Subcutaneous three times a week Every Tuesday, Thursday and Saturday       fluticasone (FLONASE) 50 MCG/ACT spray Spray 1 spray into both nostrils daily as needed for rhinitis or allergies       nystatin-triamcinolone (MYCOLOG II) cream Apply topically 2 times daily as needed       Omega-3 Fatty Acids (OMEGA-3 PO) Take 300-1,000 mg by mouth daily       Pollen Extracts (PROSTAT PO) Take by mouth 2 times daily 15gm/1oz       collagenase ointment Apply topically as needed (Wound care for pressure ulcers)       acetaminophen (TYLENOL) 325 MG tablet Take 650 mg by mouth every 4 hours as needed for mild pain       OMEPRAZOLE PO Take 20 mg by mouth 2 times daily (before meals)        ferrous sulfate (IRON) 325 (65 FE) MG tablet Take 325 mg by mouth daily (with breakfast)       traMADol (ULTRAM) 50 MG tablet Take 1 tablet (50 mg) by mouth every 6 hours as needed 28 tablet      gabapentin (NEURONTIN) 100 MG capsule Take 1-2 capsules (100-200 mg) by mouth 3 times daily Take 100 mg (1 capsule) by mouth in the morning, 100 mg at noon, 100 mg in the afternoon, and 200 mg (2 capsules) at bedtimeSee Admin Instructions Take 100 mg (1 capsule) by mouth in the morning, 100 mg at noon, 100 mg in the afternoon, and 200 mg (2 capsules) at bedtime 90 capsule 0     PARoxetine (PAXIL-CR) 25 MG 24 hr tablet Take 1 tablet (25 mg) by mouth every morning 30 tablet      atenolol (TENORMIN) 25 MG tablet Take 1 tablet (25 mg) by mouth 2 times daily 30 tablet      calcium carbonate (OS-EVELINE 500 MG [place]. CA) 500 MG tablet Take 1 tablet (500 mg) by mouth daily 90 tablet      multivitamin, therapeutic with minerals (THERA-VIT-M) TABS Take 1 tablet by mouth daily 30 each 0     baclofen (LIORESAL) 20 MG tablet Take 0.5  tablets (10 mg) by mouth 4 times daily as needed for muscle spasms (Patient taking differently: Take 10 mg by mouth 2 times daily Taken Every AM and PM. Also given up to three times throughout the day PRN) 120 tablet 3     methyl salicylate-menthol (BENGAY) OINT Apply 2 g topically every 6 hours as needed (pain). 1 Tube 2       INTERIM SOCIAL HISTORY:  Social situation has not changed since the last visit.  She is unemployed.  She is living in a nursing home, ([facility]).     PHYSICAL EXAMINATION:  Interrogation of the Baclofen  pump shows that it is currently running at a Simple Continuous dose of 27.0 mcg/day. She also has a Flex (complex continuous) dose of 35.0 mcg/hour over 5 hours; 35.4 mcg/hour over 5 hours for total dose of 730.4 mcg/day.  Pump site is intact with no signs of infection, redness, swelling or seroma.     Clonus is not present.     Musculoskeletal contractures are present in the knees and ankles. She has bilateral knee flexion contractures with bilateral plantar flexion contractures, and her right ankle has an inversion flexion contracture.     Gait examination showed non-ambulatory.     Upper extremity motor control:  Unchanged from previous exam.     Lower extremity motor control/gait:  Unchanged from previous exam.     Oral motor control/speech:  Is optimized     Pain:  Is under adequate control.     Psychosocial:  Status is unchanged from the previous visit    IMPRESSION/PLAN:  Visit Reason: Pump Refill  NDC #: 2000 (97249-510-25)  Sterile Prep & Drape: Yes  Mililiters Withdrawn: 4.2  Mililiters Expected: 4.7  Previous Dose: simple continous 27.0 mcg/day, with flex dosing to equal 730.4 mcg/day  New Dose: simple continuous 27.0 mcg/day, with flex dosing to equal 730.4 mcg/day  Alarm Date: 2/7/2018  Spasticity: Optimal  Therapeutic Level: Optimal  Symptoms: none noted   Estimated LES: 37months    Next pump refill appointment 2/5/2018      Niru Yun RN  Under the  supervision of  Gigi Bassett MD  Department of Physical Medicine and Rehabilitation

## 2017-12-20 NOTE — MR AVS SNAPSHOT
After Visit Summary   12/20/2017    Aga Fraga    MRN: [id number]           Patient Information     Date Of Birth          1950        Visit Information        Provider Department      12/20/2017 1:00 PM Nurse, Malcolm Ruby [facility] Physical Medicine and Rehabilitation        Today's Diagnoses     MS (multiple sclerosis) (H)    -  1    Spasm of muscle        Presence of intrathecal baclofen pump           Follow-ups after your visit        Follow-up notes from your care team     Return in about 7 weeks (around 2/5/2018).      Your next 10 appointments already scheduled     Feb 05, 2018  1:00 PM CST   (Arrive by 12:45 PM)   Return Visit with  Pmr Nurse   [facility] Physical Medicine and Rehabilitation ([facility])    [address] [place] [id number] [phone]            Mar 26, 2018 11:00 AM CDT   LAB with  LAB   [facility] Lab ([facility])    [address] [place] [id number] [phone]           Please do not eat 10-12 hours before your appointment if you are coming in fasting for labs on lipids, cholesterol, or glucose (sugar). This does not apply to pregnant women. Water, hot tea and black coffee (with nothing added) are okay. Do not drink other fluids, diet soda or chew gum.            Mar 26, 2018 11:15 AM CDT   US RENAL COMPLETE with UCUS2   [facility] US ([facility])    [address] [place] [id number] [phone]           Please bring a list of your medicines (including vitamins, minerals and over-the-counter drugs). Also, tell your doctor about any allergies you may have. Wear comfortable clothes and leave your valuables at home.  You do not need to do anything special to prepare for your exam.  Please call the Imaging Department at your exam site with any questions.            Mar 26, 2018 12:30 PM CDT   (Arrive  by 12:15 PM)   Return Visit with Lynn Lewis PA-C   [facility] for Prostate and Urologic Cancers ([facility])    [address] [address] [phone]              Who to contact     Please call your clinic at [phone] to:    Ask questions about your health    Make or cancel appointments    Discuss your medicines    Learn about your test results    Speak to your doctor   If you have compliments or concerns about an experience at your clinic, or if you wish to file a complaint, please contact [facility] Patient Relations at [phone] or email us at [email or web address]         Additional Information About Your Visit        Minor Studioshart Information     New KCBX is an electronic gateway that provides easy, online access to your medical records. With [employer] KCBX, you can request a clinic appointment, read your test results, renew a prescription or communicate with your care team.     To sign up for New KCBX visit the website at [email or web address]   You will be asked to enter the access code listed below, as well as some personal information. Please follow the directions to create your username and password.     Your access code is: [email or web address]  Expires: 2018  5:30 AM     Your access code will  in 90 days. If you need help or a new code, please contact your [facility] or call [phone] for assistance.        Care EveryWhere ID     This is your Care EveryWhere ID. This could be used by other organizations to access your [place] medical records  [id number]         Blood Pressure from Last 3 Encounters:   17 108/54   17 120/66   17 122/54    Weight from Last 3 Encounters:   17 62.6 kg (138 lb)   17 63.5 kg (140 lb)   17 63 kg (139 lb)              Today, you had the following     No orders found for display          Today's Medication Changes          These changes are accurate as of: 12/20/17  4:41 PM.  If you have any questions, ask your nurse or doctor.               These medicines have changed or have updated prescriptions.        Dose/Directions    baclofen 20 MG tablet   Commonly known as:  LIORESAL   This may have changed:    - when to take this  - additional instructions   Used for:  Multiple sclerosis (H), Muscle spasm        Dose:  10 mg   Take 0.5 tablets (10 mg) by mouth 4 times daily as needed for muscle spasms   Quantity:  120 tablet   Refills:  3                Primary Care Provider Office Phone # Fax #    Astrid Marroquin [phone]       [facility] AFTER HOURS [address] [place] [address]        Equal Access to Services     NOE ACE : Yvon moyao Soyuli, waaxda britniadaha, qaybta kaalmada ademelloyaalanis, carmen leos . So [facility] [phone].    ATENCIÓN: Si habla español, tiene a urena disposición servicios gratuitos de asistencia lingüística. Llame al [phone].    We comply with applicable federal civil rights laws and [place] laws. We do not discriminate on the basis of race, color, national origin, age, disability, sex, sexual orientation, or gender identity.            Thank you!     Thank you for choosing [facility] PHYSICAL MEDICINE AND REHABILITATION  for your care. Our goal is always to provide you with excellent care. Hearing back from our patients is one way we can continue to improve our services. Please take a few minutes to complete the written survey that you may receive in the mail after your visit with us. Thank you!             Your Updated Medication List - Protect others around you: Learn how to safely use, store and throw away your medicines at www.disposemymeds.org.          This list is accurate as of: 12/20/17  4:41 PM.  Always use your most recent med list.                   Brand Name Dispense Instructions for use  Diagnosis    aspirin 81 MG tablet      Take 81 mg by mouth        atenolol 25 MG tablet    TENORMIN    30 tablet    Take 1 tablet (25 mg) by mouth 2 times daily    HOCM (hypertrophic obstructive cardiomyopathy) (H)       baclofen 20 MG tablet    LIORESAL    120 tablet    Take 0.5 tablets (10 mg) by mouth 4 times daily as needed for muscle spasms    Multiple sclerosis (H), Muscle spasm       benzonatate 100 MG capsule    TESSALON     Take 100 mg by mouth        calcium carbonate 1250 MG tablet    OS-EVELINE 500 mg [place]. Ca    90 tablet    Take 1 tablet (500 mg) by mouth daily    MS (multiple sclerosis) (H)       celecoxib 100 MG capsule    celeBREX     Take 100 mg by mouth        cetirizine 10 MG tablet    zyrTEC     Take 10 mg by mouth        cholecalciferol 1000 UNIT tablet    vitamin D3     Take 1,000 Units by mouth daily        collagenase ointment      Apply topically as needed (Wound care for pressure ulcers)        COPAXONE 40 MG/ML Sosy   Generic drug:  Glatiramer Acetate      Inject 1 mL Subcutaneous three times a week Every Tuesday, Thursday and Saturday        DOCOSAHEXAENOIC ACID PO      Take 1 g by mouth        ferrous sulfate 325 (65 FE) MG tablet    IRON     Take 325 mg by mouth daily (with breakfast)        fluticasone 50 MCG/ACT spray    FLONASE     Spray 1 spray into both nostrils daily as needed for rhinitis or allergies        gabapentin 100 MG capsule    NEURONTIN    90 capsule    Take 1-2 capsules (100-200 mg) by mouth 3 times daily Take 100 mg (1 capsule) by mouth in the morning, 100 mg at noon, 100 mg in the afternoon, and 200 mg (2 capsules) at bedtimeSee Admin Instructions Take 100 mg (1 capsule) by mouth in the morning, 100 mg at noon, 100 mg in the afternoon, and 200 mg (2 capsules) at bedtime    Spasm of muscle       loratadine 10 MG tablet    CLARITIN     Take 10 mg by mouth        methyl salicylate-menthol Oint     1 Tube    Apply 2 g topically every 6 hours as needed (pain).    Multiple  scleroses       multivitamin, therapeutic with minerals Tabs tablet     30 each    Take 1 tablet by mouth daily    MS (multiple sclerosis) (H)       nystatin 053685 UNIT/GM Powd    MYCOSTATIN     Apply 1 Application topically        nystatin-triamcinolone cream    MYCOLOG II     Apply topically 2 times daily as needed        OMEGA-3 PO      Take 300-1,000 mg by mouth daily        OMEPRAZOLE PO      Take 20 mg by mouth 2 times daily (before meals)        oxyCODONE IR 5 MG tablet    ROXICODONE    30 tablet    Take 1 tablet (5 mg) by mouth every 4 hours as needed for moderate to severe pain    Kidney stones       PARoxetine 25 MG 24 hr tablet    PAXIL-CR    30 tablet    Take 1 tablet (25 mg) by mouth every morning    Episode of recurrent major depressive disorder, unspecified depression episode severity (H)       polyethylene glycol Packet    MIRALAX/GLYCOLAX     Take 17 g by mouth        PROSTAT PO      Take by mouth 2 times daily 15gm/1oz        traMADol 50 MG tablet    ULTRAM    28 tablet    Take 1 tablet (50 mg) by mouth every 6 hours as needed    Spasm of muscle       TYLENOL 325 MG tablet   Generic drug:  acetaminophen      Take 650 mg by mouth every 4 hours as needed for mild pain

## 2018-01-01 ENCOUNTER — ALLIED HEALTH/NURSE VISIT (OUTPATIENT)
Dept: PHYSICAL MEDICINE AND REHAB | Facility: CLINIC | Age: 68
End: 2018-01-01
Payer: MEDICARE

## 2018-01-01 ENCOUNTER — TELEPHONE (OUTPATIENT)
Dept: PHYSICAL MEDICINE AND REHAB | Facility: CLINIC | Age: 68
End: 2018-01-01

## 2018-01-01 ENCOUNTER — RECORDS - HEALTHEAST (OUTPATIENT)
Dept: LAB | Facility: CLINIC | Age: 68
End: 2018-01-01

## 2018-01-01 ENCOUNTER — CARE COORDINATION (OUTPATIENT)
Dept: UROLOGY | Facility: CLINIC | Age: 68
End: 2018-01-01

## 2018-01-01 ENCOUNTER — OFFICE VISIT - HEALTHEAST (OUTPATIENT)
Dept: GERIATRICS | Facility: CLINIC | Age: 68
End: 2018-01-01

## 2018-01-01 ENCOUNTER — COMMUNICATION - HEALTHEAST (OUTPATIENT)
Dept: GERIATRICS | Facility: CLINIC | Age: 68
End: 2018-01-01

## 2018-01-01 ENCOUNTER — HOSPITAL ENCOUNTER (OUTPATIENT)
Facility: AMBULATORY SURGERY CENTER | Age: 68
End: 2018-04-27
Attending: UROLOGY
Payer: MEDICARE

## 2018-01-01 ENCOUNTER — OFFICE VISIT (OUTPATIENT)
Dept: UROLOGY | Facility: CLINIC | Age: 68
End: 2018-01-01
Payer: MEDICARE

## 2018-01-01 ENCOUNTER — ANESTHESIA (OUTPATIENT)
Dept: SURGERY | Facility: AMBULATORY SURGERY CENTER | Age: 68
End: 2018-01-01

## 2018-01-01 ENCOUNTER — RECORDS - HEALTHEAST (OUTPATIENT)
Dept: ADMINISTRATIVE | Facility: OTHER | Age: 68
End: 2018-01-01

## 2018-01-01 ENCOUNTER — AMBULATORY - HEALTHEAST (OUTPATIENT)
Dept: GERIATRICS | Facility: CLINIC | Age: 68
End: 2018-01-01

## 2018-01-01 ENCOUNTER — MEDICAL CORRESPONDENCE (OUTPATIENT)
Dept: HEALTH INFORMATION MANAGEMENT | Facility: CLINIC | Age: 68
End: 2018-01-01

## 2018-01-01 ENCOUNTER — ANESTHESIA EVENT (OUTPATIENT)
Dept: SURGERY | Facility: AMBULATORY SURGERY CENTER | Age: 68
End: 2018-01-01

## 2018-01-01 ENCOUNTER — PRE VISIT (OUTPATIENT)
Dept: UROLOGY | Facility: CLINIC | Age: 68
End: 2018-01-01

## 2018-01-01 ENCOUNTER — SURGERY (OUTPATIENT)
Age: 68
End: 2018-01-01

## 2018-01-01 ENCOUNTER — TELEPHONE (OUTPATIENT)
Dept: UROLOGY | Facility: CLINIC | Age: 68
End: 2018-01-01

## 2018-01-01 ENCOUNTER — RADIANT APPOINTMENT (OUTPATIENT)
Dept: ULTRASOUND IMAGING | Facility: CLINIC | Age: 68
End: 2018-01-01
Attending: UROLOGY
Payer: MEDICARE

## 2018-01-01 ENCOUNTER — PATIENT OUTREACH (OUTPATIENT)
Dept: CARE COORDINATION | Facility: CLINIC | Age: 68
End: 2018-01-01

## 2018-01-01 VITALS
DIASTOLIC BLOOD PRESSURE: 65 MMHG | WEIGHT: 140 LBS | SYSTOLIC BLOOD PRESSURE: 134 MMHG | OXYGEN SATURATION: 99 % | HEIGHT: 65 IN | BODY MASS INDEX: 23.32 KG/M2 | HEART RATE: 62 BPM | TEMPERATURE: 98.5 F | RESPIRATION RATE: 14 BRPM

## 2018-01-01 VITALS
WEIGHT: 140 LBS | HEART RATE: 67 BPM | SYSTOLIC BLOOD PRESSURE: 123 MMHG | DIASTOLIC BLOOD PRESSURE: 66 MMHG | BODY MASS INDEX: 23.32 KG/M2 | HEIGHT: 65 IN

## 2018-01-01 DIAGNOSIS — G89.29 CHRONIC PAIN: ICD-10-CM

## 2018-01-01 DIAGNOSIS — G31.84 MILD COGNITIVE IMPAIRMENT: ICD-10-CM

## 2018-01-01 DIAGNOSIS — Z93.59 SUPRAPUBIC CATHETER (H): ICD-10-CM

## 2018-01-01 DIAGNOSIS — I10 ESSENTIAL HYPERTENSION: ICD-10-CM

## 2018-01-01 DIAGNOSIS — M62.838 MUSCLE SPASTICITY: ICD-10-CM

## 2018-01-01 DIAGNOSIS — N31.9 NEUROGENIC BLADDER: ICD-10-CM

## 2018-01-01 DIAGNOSIS — G35 MULTIPLE SCLEROSIS (H): ICD-10-CM

## 2018-01-01 DIAGNOSIS — N39.45 CONTINUOUS LEAKAGE OF URINE: ICD-10-CM

## 2018-01-01 DIAGNOSIS — G35 MS (MULTIPLE SCLEROSIS) (H): ICD-10-CM

## 2018-01-01 DIAGNOSIS — M62.838 SPASM OF MUSCLE: ICD-10-CM

## 2018-01-01 DIAGNOSIS — N39.0 URINARY TRACT INFECTION: ICD-10-CM

## 2018-01-01 DIAGNOSIS — S82.201A RIGHT TIBIAL FRACTURE: ICD-10-CM

## 2018-01-01 DIAGNOSIS — Z87.01 HISTORY OF PNEUMONIA: ICD-10-CM

## 2018-01-01 DIAGNOSIS — D50.0 IRON DEFICIENCY ANEMIA DUE TO CHRONIC BLOOD LOSS: ICD-10-CM

## 2018-01-01 DIAGNOSIS — G35 MS (MULTIPLE SCLEROSIS) (H): Primary | ICD-10-CM

## 2018-01-01 DIAGNOSIS — A41.9 SEPSIS SECONDARY TO UTI (H): ICD-10-CM

## 2018-01-01 DIAGNOSIS — Z87.440 HISTORY OF RECURRENT UTIS: ICD-10-CM

## 2018-01-01 DIAGNOSIS — R52 PAIN: ICD-10-CM

## 2018-01-01 DIAGNOSIS — H66.91 RIGHT OTITIS MEDIA: ICD-10-CM

## 2018-01-01 DIAGNOSIS — F41.9 ANXIETY AND DEPRESSION: ICD-10-CM

## 2018-01-01 DIAGNOSIS — F32.A ANXIETY AND DEPRESSION: ICD-10-CM

## 2018-01-01 DIAGNOSIS — F32.A DEPRESSION, UNSPECIFIED DEPRESSION TYPE: ICD-10-CM

## 2018-01-01 DIAGNOSIS — I42.9 CARDIOMYOPATHY (H): ICD-10-CM

## 2018-01-01 DIAGNOSIS — H70.91 MASTOIDITIS OF RIGHT SIDE: ICD-10-CM

## 2018-01-01 DIAGNOSIS — A41.9 SEPSIS (H): ICD-10-CM

## 2018-01-01 DIAGNOSIS — Z95.828 PRESENCE OF IMPLANTED INFUSION PUMP: ICD-10-CM

## 2018-01-01 DIAGNOSIS — R41.89 IMPAIRMENT OF COGNITIVE FUNCTION: ICD-10-CM

## 2018-01-01 DIAGNOSIS — N31.9 NEUROGENIC BLADDER: Primary | ICD-10-CM

## 2018-01-01 DIAGNOSIS — I42.9 CARDIOMYOPATHY, UNSPECIFIED TYPE (H): ICD-10-CM

## 2018-01-01 DIAGNOSIS — I10 HYPERTENSION: ICD-10-CM

## 2018-01-01 DIAGNOSIS — R09.02 HYPOXIA: ICD-10-CM

## 2018-01-01 DIAGNOSIS — L89.302: ICD-10-CM

## 2018-01-01 DIAGNOSIS — N39.3 STRESS INCONTINENCE OF URINE: Primary | ICD-10-CM

## 2018-01-01 DIAGNOSIS — G89.4 CHRONIC PAIN DISORDER: ICD-10-CM

## 2018-01-01 DIAGNOSIS — Z97.8 STATUS POST INSERTION OF INTRATHECAL BACLOFEN PUMP: ICD-10-CM

## 2018-01-01 DIAGNOSIS — Z86.59 HISTORY OF DEPRESSION: ICD-10-CM

## 2018-01-01 DIAGNOSIS — L89.309 PRESSURE SORE ON BUTTOCKS: ICD-10-CM

## 2018-01-01 DIAGNOSIS — N20.0 BILATERAL KIDNEY STONES: ICD-10-CM

## 2018-01-01 DIAGNOSIS — J96.21 ACUTE AND CHRONIC RESPIRATORY FAILURE WITH HYPOXIA (H): ICD-10-CM

## 2018-01-01 DIAGNOSIS — L89.301: ICD-10-CM

## 2018-01-01 DIAGNOSIS — F32.A DEPRESSION: ICD-10-CM

## 2018-01-01 DIAGNOSIS — R79.89 ELEVATED BRAIN NATRIURETIC PEPTIDE (BNP) LEVEL: ICD-10-CM

## 2018-01-01 DIAGNOSIS — R25.2 SPASTICITY: ICD-10-CM

## 2018-01-01 DIAGNOSIS — J96.10 CHRONIC RESPIRATORY FAILURE (H): ICD-10-CM

## 2018-01-01 DIAGNOSIS — S82.201A: ICD-10-CM

## 2018-01-01 DIAGNOSIS — N39.0 SEPSIS SECONDARY TO UTI (H): ICD-10-CM

## 2018-01-01 DIAGNOSIS — R05.3 CHRONIC COUGH: ICD-10-CM

## 2018-01-01 DIAGNOSIS — G35 MULTIPLE SCLEROSIS, RELAPSING-REMITTING (H): ICD-10-CM

## 2018-01-01 LAB
ALBUMIN SERPL-MCNC: 3 G/DL (ref 3.4–5)
ALBUMIN UR-MCNC: ABNORMAL MG/DL
ALP SERPL-CCNC: 73 U/L (ref 40–150)
ALT SERPL W P-5'-P-CCNC: 34 U/L (ref 0–50)
AMORPH CRY #/AREA URNS HPF: ABNORMAL /[HPF]
AMORPH CRY #/AREA URNS HPF: ABNORMAL /[HPF]
ANION GAP SERPL CALCULATED.3IONS-SCNC: 4 MMOL/L (ref 3–14)
ANION GAP SERPL CALCULATED.3IONS-SCNC: 7 MMOL/L (ref 5–18)
ANION GAP SERPL CALCULATED.3IONS-SCNC: 9 MMOL/L (ref 5–18)
APPEARANCE UR: ABNORMAL
AST SERPL W P-5'-P-CCNC: 19 U/L (ref 0–45)
BACTERIA #/AREA URNS HPF: ABNORMAL HPF
BACTERIA SPEC CULT: ABNORMAL
BACTERIA SPEC CULT: NORMAL
BASOPHILS # BLD AUTO: 0 THOU/UL (ref 0–0.2)
BASOPHILS # BLD AUTO: 0 THOU/UL (ref 0–0.2)
BASOPHILS NFR BLD AUTO: 0 % (ref 0–2)
BASOPHILS NFR BLD AUTO: 0 % (ref 0–2)
BILIRUB SERPL-MCNC: 0.2 MG/DL (ref 0.2–1.3)
BILIRUB UR QL STRIP: NEGATIVE
BUN SERPL-MCNC: 12 MG/DL (ref 8–22)
BUN SERPL-MCNC: 26 MG/DL (ref 7–30)
BUN SERPL-MCNC: 35 MG/DL (ref 8–22)
CALCIUM SERPL-MCNC: 10 MG/DL (ref 8.5–10.5)
CALCIUM SERPL-MCNC: 10 MG/DL (ref 8.5–10.5)
CALCIUM SERPL-MCNC: 9.7 MG/DL (ref 8.5–10.1)
CHLORIDE BLD-SCNC: 104 MMOL/L (ref 98–107)
CHLORIDE BLD-SCNC: 105 MMOL/L (ref 98–107)
CHLORIDE SERPL-SCNC: 101 MMOL/L (ref 94–109)
CO2 SERPL-SCNC: 25 MMOL/L (ref 22–31)
CO2 SERPL-SCNC: 29 MMOL/L (ref 22–31)
CO2 SERPL-SCNC: 32 MMOL/L (ref 20–32)
COLOR UR AUTO: YELLOW
CREAT SERPL-MCNC: 0.41 MG/DL (ref 0.52–1.04)
CREAT SERPL-MCNC: 0.46 MG/DL (ref 0.6–1.1)
CREAT SERPL-MCNC: 0.66 MG/DL (ref 0.6–1.1)
EOSINOPHIL # BLD AUTO: 0 THOU/UL (ref 0–0.4)
EOSINOPHIL # BLD AUTO: 0.1 THOU/UL (ref 0–0.4)
EOSINOPHIL NFR BLD AUTO: 0 % (ref 0–6)
EOSINOPHIL NFR BLD AUTO: 2 % (ref 0–6)
ERYTHROCYTE [DISTWIDTH] IN BLOOD BY AUTOMATED COUNT: 14.3 % (ref 11–14.5)
ERYTHROCYTE [DISTWIDTH] IN BLOOD BY AUTOMATED COUNT: 14.6 % (ref 11–14.5)
ERYTHROCYTE [DISTWIDTH] IN BLOOD BY AUTOMATED COUNT: 14.6 % (ref 11–14.5)
FLUAV AG SPEC QL IA: NORMAL
FLUBV AG SPEC QL IA: NORMAL
GFR SERPL CREATININE-BSD FRML MDRD: >60 ML/MIN/1.73M2
GFR SERPL CREATININE-BSD FRML MDRD: >60 ML/MIN/1.73M2
GFR SERPL CREATININE-BSD FRML MDRD: >90 ML/MIN/1.7M2
GLUCOSE BLD-MCNC: 123 MG/DL (ref 70–125)
GLUCOSE BLD-MCNC: 99 MG/DL (ref 70–125)
GLUCOSE SERPL-MCNC: 99 MG/DL (ref 70–99)
GLUCOSE UR STRIP-MCNC: NEGATIVE MG/DL
HCT VFR BLD AUTO: 34.7 % (ref 35–47)
HCT VFR BLD AUTO: 37.7 % (ref 35–47)
HCT VFR BLD AUTO: 41.3 % (ref 35–47)
HGB BLD-MCNC: 10.6 G/DL (ref 12–16)
HGB BLD-MCNC: 10.9 G/DL (ref 12–16)
HGB BLD-MCNC: 11.1 G/DL (ref 12–16)
HGB BLD-MCNC: 12.3 G/DL (ref 12–16)
HGB BLD-MCNC: 12.6 G/DL (ref 12–16)
HGB UR QL STRIP: ABNORMAL
HGB UR QL STRIP: ABNORMAL
HGB UR QL STRIP: NEGATIVE
KETONES UR STRIP-MCNC: NEGATIVE MG/DL
LEUKOCYTE ESTERASE UR QL STRIP: ABNORMAL
LYMPHOCYTES # BLD AUTO: 0.4 THOU/UL (ref 0.8–4.4)
LYMPHOCYTES # BLD AUTO: 0.8 THOU/UL (ref 0.8–4.4)
LYMPHOCYTES NFR BLD AUTO: 10 % (ref 20–40)
LYMPHOCYTES NFR BLD AUTO: 4 % (ref 20–40)
MCH RBC QN AUTO: 28.6 PG (ref 27–34)
MCH RBC QN AUTO: 29.4 PG (ref 27–34)
MCH RBC QN AUTO: 29.7 PG (ref 27–34)
MCHC RBC AUTO-ENTMCNC: 30.5 G/DL (ref 32–36)
MCHC RBC AUTO-ENTMCNC: 32 G/DL (ref 32–36)
MCHC RBC AUTO-ENTMCNC: 32.6 G/DL (ref 32–36)
MCV RBC AUTO: 91 FL (ref 80–100)
MCV RBC AUTO: 92 FL (ref 80–100)
MCV RBC AUTO: 94 FL (ref 80–100)
MONOCYTES # BLD AUTO: 0.8 THOU/UL (ref 0–0.9)
MONOCYTES # BLD AUTO: 0.9 THOU/UL (ref 0–0.9)
MONOCYTES NFR BLD AUTO: 11 % (ref 2–10)
MONOCYTES NFR BLD AUTO: 11 % (ref 2–10)
MUCOUS THREADS #/AREA URNS LPF: ABNORMAL LPF
NEUTROPHILS # BLD AUTO: 6 THOU/UL (ref 2–7.7)
NEUTROPHILS # BLD AUTO: 7.6 THOU/UL (ref 2–7.7)
NEUTROPHILS NFR BLD AUTO: 78 % (ref 50–70)
NEUTROPHILS NFR BLD AUTO: 85 % (ref 50–70)
NITRATE UR QL: NEGATIVE
NITRATE UR QL: NEGATIVE
NITRATE UR QL: POSITIVE
PH UR STRIP: 6.5 [PH] (ref 4.5–8)
PH UR STRIP: 7 [PH] (ref 4.5–8)
PH UR STRIP: 7.5 [PH] (ref 4.5–8)
PLATELET # BLD AUTO: 143 THOU/UL (ref 140–440)
PLATELET # BLD AUTO: 175 THOU/UL (ref 140–440)
PLATELET # BLD AUTO: 182 THOU/UL (ref 140–440)
PMV BLD AUTO: 10 FL (ref 8.5–12.5)
PMV BLD AUTO: 10 FL (ref 8.5–12.5)
PMV BLD AUTO: 9.8 FL (ref 8.5–12.5)
POTASSIUM BLD-SCNC: 3.6 MMOL/L (ref 3.5–5)
POTASSIUM BLD-SCNC: 4.7 MMOL/L (ref 3.5–5)
POTASSIUM SERPL-SCNC: 4.2 MMOL/L (ref 3.4–5.3)
PROT SERPL-MCNC: 7.2 G/DL (ref 6.8–8.8)
RBC # BLD AUTO: 3.77 MILL/UL (ref 3.8–5.4)
RBC # BLD AUTO: 4.14 MILL/UL (ref 3.8–5.4)
RBC # BLD AUTO: 4.41 MILL/UL (ref 3.8–5.4)
RBC #/AREA URNS AUTO: ABNORMAL HPF
SODIUM SERPL-SCNC: 137 MMOL/L (ref 133–144)
SODIUM SERPL-SCNC: 138 MMOL/L (ref 136–145)
SODIUM SERPL-SCNC: 141 MMOL/L (ref 136–145)
SP GR UR STRIP: 1.01 (ref 1–1.03)
SP GR UR STRIP: 1.02 (ref 1–1.03)
SP GR UR STRIP: 1.02 (ref 1–1.03)
SQUAMOUS #/AREA URNS AUTO: ABNORMAL LPF
UROBILINOGEN UR STRIP-ACNC: ABNORMAL
WBC #/AREA URNS AUTO: >100 HPF
WBC #/AREA URNS AUTO: ABNORMAL HPF
WBC #/AREA URNS AUTO: ABNORMAL HPF
WBC CLUMPS #/AREA URNS HPF: PRESENT /[HPF]
WBC CLUMPS #/AREA URNS HPF: PRESENT /[HPF]
WBC: 7.8 THOU/UL (ref 4–11)
WBC: 8.6 THOU/UL (ref 4–11)
WBC: 9 THOU/UL (ref 4–11)

## 2018-01-01 RX ORDER — MEPERIDINE HYDROCHLORIDE 25 MG/ML
12.5 INJECTION INTRAMUSCULAR; INTRAVENOUS; SUBCUTANEOUS
Status: CANCELLED | OUTPATIENT
Start: 2018-01-01

## 2018-01-01 RX ORDER — ONDANSETRON 4 MG/1
4 TABLET, ORALLY DISINTEGRATING ORAL EVERY 30 MIN PRN
Status: CANCELLED | OUTPATIENT
Start: 2018-01-01

## 2018-01-01 RX ORDER — SODIUM CHLORIDE, SODIUM LACTATE, POTASSIUM CHLORIDE, CALCIUM CHLORIDE 600; 310; 30; 20 MG/100ML; MG/100ML; MG/100ML; MG/100ML
INJECTION, SOLUTION INTRAVENOUS CONTINUOUS
Status: CANCELLED | OUTPATIENT
Start: 2018-01-01

## 2018-01-01 RX ORDER — ONDANSETRON 2 MG/ML
4 INJECTION INTRAMUSCULAR; INTRAVENOUS EVERY 30 MIN PRN
Status: CANCELLED | OUTPATIENT
Start: 2018-01-01

## 2018-01-01 RX ORDER — FENTANYL CITRATE 50 UG/ML
25-50 INJECTION, SOLUTION INTRAMUSCULAR; INTRAVENOUS
Status: CANCELLED | OUTPATIENT
Start: 2018-01-01

## 2018-01-01 RX ORDER — LIDOCAINE 40 MG/G
CREAM TOPICAL
Status: DISCONTINUED | OUTPATIENT
Start: 2018-01-01 | End: 2018-01-01 | Stop reason: HOSPADM

## 2018-01-01 RX ORDER — HYDROMORPHONE HYDROCHLORIDE 1 MG/ML
.3-.5 INJECTION, SOLUTION INTRAMUSCULAR; INTRAVENOUS; SUBCUTANEOUS EVERY 10 MIN PRN
Status: CANCELLED | OUTPATIENT
Start: 2018-01-01

## 2018-01-01 RX ORDER — ALBUTEROL SULFATE 1.25 MG/3ML
1 SOLUTION RESPIRATORY (INHALATION) EVERY 4 HOURS PRN
COMMUNITY

## 2018-01-01 RX ORDER — SODIUM CHLORIDE, SODIUM LACTATE, POTASSIUM CHLORIDE, CALCIUM CHLORIDE 600; 310; 30; 20 MG/100ML; MG/100ML; MG/100ML; MG/100ML
INJECTION, SOLUTION INTRAVENOUS CONTINUOUS
Status: DISCONTINUED | OUTPATIENT
Start: 2018-01-01 | End: 2018-01-01 | Stop reason: HOSPADM

## 2018-01-01 RX ORDER — NALOXONE HYDROCHLORIDE 0.4 MG/ML
.1-.4 INJECTION, SOLUTION INTRAMUSCULAR; INTRAVENOUS; SUBCUTANEOUS
Status: CANCELLED | OUTPATIENT
Start: 2018-01-01 | End: 2018-01-01

## 2018-01-01 ASSESSMENT — MIFFLIN-ST. JEOR
SCORE: 1113.72
SCORE: 1109.19
SCORE: 1169.06

## 2018-01-01 ASSESSMENT — PAIN SCALES - GENERAL: PAINLEVEL: NO PAIN (0)

## 2018-02-05 NOTE — MR AVS SNAPSHOT
After Visit Summary   2/5/2018    Aga Fraga    MRN: [id number]           Patient Information     Date Of Birth          1950        Visit Information        Provider Department      2/5/2018 1:00 PM Nurse, Uc Pmr [facility]        Today's Diagnoses     MS (multiple sclerosis) (H)    -  1    Spasm of muscle        Presence of implanted infusion pump           Follow-ups after your visit        Follow-up notes from your care team     Return in about 6 weeks (around 3/21/2018) for Baclofen Pump refill.      Your next 10 appointments already scheduled     Mar 21, 2018  1:00 PM CDT   (Arrive by 12:45 PM)   Return Visit with  Pmr Nurse   [facility] ([facility])    [address] [place] [address] [id number] [phone]            Mar 26, 2018 11:00 AM CDT   LAB with  LAB   [facility] ([facility])    [address] [place] [address] [id number] [phone]           Please do not eat 10-12 hours before your appointment if you are coming in fasting for labs on lipids, cholesterol, or glucose (sugar). This does not apply to pregnant women. Water, hot tea and black coffee (with nothing added) are okay. Do not drink other fluids, diet soda or chew gum.            Mar 26, 2018 11:15 AM CDT   US RENAL COMPLETE with UCUS2   [facility] ([facility])    [address] [place] [address] [id number] [phone]           Please bring a list of your medicines (including vitamins, minerals and over-the-counter drugs). Also, tell your doctor about any allergies you may have. Wear comfortable clothes and leave your valuables at home.  You do not need to do anything special to prepare for your exam.  Please call the Imaging Department at your exam site with any questions.            Mar 26, 2018  12:30 PM CDT   (Arrive by 12:15 PM)   Return Visit with Lynn Lewis PA-C   [facility] ([facility])    [address] [place] [id number] [phone]              Who to contact     Please call your clinic at [phone] to:    Ask questions about your health    Make or cancel appointments    Discuss your medicines    Learn about your test results    Speak to your doctor   If you have compliments or concerns about an experience at your clinic, or if you wish to file a complaint, please contact [facility] Patient Relations at [phone] or email us at [email or web address]         Additional Information About Your Visit        Musical Sneakersharcastaclip Information     [employer] is an electronic gateway that provides easy, online access to your medical records. With [employer], you can request a clinic appointment, read your test results, renew a prescription or communicate with your care team.     To sign up for [employer] visit the website at [email or web address]   You will be asked to enter the access code listed below, as well as some personal information. Please follow the directions to create your username and password.     Your access code is: [email or web address]  Expires: 2018  1:28 PM     Your access code will  in 90 days. If you need help or a new code, please contact your [facility] or call [phone] for assistance.        Care EveryWhere ID     This is your Care EveryWhere ID. This could be used by other organizations to access your [place] medical records  [id number]         Blood Pressure from Last 3 Encounters:   17 108/54   17 120/66   17 122/54    Weight from Last 3 Encounters:   17 62.6 kg (138 lb)   17 63.5 kg (140 lb)   17 63 kg (139 lb)              Today, you had the following     No orders  found for display         Today's Medication Changes          These changes are accurate as of 2/5/18 11:59 PM.  If you have any questions, ask your nurse or doctor.               These medicines have changed or have updated prescriptions.        Dose/Directions    baclofen 20 MG tablet   Commonly known as:  LIORESAL   This may have changed:    - when to take this  - additional instructions   Used for:  Multiple sclerosis (H), Muscle spasm        Dose:  10 mg   Take 0.5 tablets (10 mg) by mouth 4 times daily as needed for muscle spasms   Quantity:  120 tablet   Refills:  3                Primary Care Provider Office Phone # Fax #    Astrid Marroquin [phone]       [facility] AFTER HOURS [address] [place] [address]        Equal Access to Services     NOE ACE : Yvon Cardona, waeboni wright, qaybta kaalmada annelise, carmen leos . So [facility] [phone].    ATENCIÓN: Si habla español, tiene a urena disposición servicios gratuitos de asistencia lingüística. Llame al [phone].    We comply with applicable federal civil rights laws and [place] laws. We do not discriminate on the basis of race, color, national origin, age, disability, sex, sexual orientation, or gender identity.            Thank you!     Thank you for choosing [facility]ABIL[facility]ATION  for your care. Our goal is always to provide you with excellent care. Hearing back from our patients is one way we can continue to improve our services. Please take a few minutes to complete the written survey that you may receive in the mail after your visit with us. Thank you!             Your Updated Medication List - Protect others around you: Learn how to safely use, store and throw away your medicines at www.disposemymeds.org.          This list is accurate as of 2/5/18 11:59 PM.  Always use your most recent med list.                   Brand Name Dispense  Instructions for use Diagnosis    aspirin 81 MG tablet      Take 81 mg by mouth        atenolol 25 MG tablet    TENORMIN    30 tablet    Take 1 tablet (25 mg) by mouth 2 times daily    HOCM (hypertrophic obstructive cardiomyopathy) (H)       baclofen 20 MG tablet    LIORESAL    120 tablet    Take 0.5 tablets (10 mg) by mouth 4 times daily as needed for muscle spasms    Multiple sclerosis (H), Muscle spasm       benzonatate 100 MG capsule    TESSALON     Take 100 mg by mouth        calcium carbonate 1250 MG tablet    OS-EVELINE 500 mg [place]. [place]    90 tablet    Take 1 tablet (500 mg) by mouth daily    MS (multiple sclerosis) (H)       celecoxib 100 MG capsule    celeBREX     Take 100 mg by mouth        cetirizine 10 MG tablet    zyrTEC     Take 10 mg by mouth        cholecalciferol 1000 UNIT tablet    vitamin D3     Take 1,000 Units by mouth daily        collagenase ointment    SANTYL     Apply topically as needed (Wound care for pressure ulcers)        COPAXONE 40 MG/ML Sosy   Generic drug:  Glatiramer Acetate      Inject 1 mL Subcutaneous three times a week Every Tuesday, Thursday and Saturday        DOCOSAHEXAENOIC ACID PO      Take 1 g by mouth        ferrous sulfate 325 (65 FE) MG tablet    IRON     Take 325 mg by mouth daily (with breakfast)        fluticasone 50 MCG/ACT spray    FLONASE     Spray 1 spray into both nostrils daily as needed for rhinitis or allergies        gabapentin 100 MG capsule    NEURONTIN    90 capsule    Take 1-2 capsules (100-200 mg) by mouth 3 times daily Take 100 mg (1 capsule) by mouth in the morning, 100 mg at noon, 100 mg in the afternoon, and 200 mg (2 capsules) at bedtimeSee Admin Instructions Take 100 mg (1 capsule) by mouth in the morning, 100 mg at noon, 100 mg in the afternoon, and 200 mg (2 capsules) at bedtime    Spasm of muscle       loratadine 10 MG tablet    CLARITIN     Take 10 mg by mouth        methyl salicylate-menthol Oint     1 Tube    Apply 2 g topically every 6 hours as  needed (pain).    Multiple scleroses       multivitamin, therapeutic with minerals Tabs tablet     30 each    Take 1 tablet by mouth daily    MS (multiple sclerosis) (H)       nystatin 154625 UNIT/GM Powd    MYCOSTATIN     Apply 1 Application topically        nystatin-triamcinolone cream    MYCOLOG II     Apply topically 2 times daily as needed        OMEGA-3 PO      Take 300-1,000 mg by mouth daily        OMEPRAZOLE PO      Take 20 mg by mouth 2 times daily (before meals)        oxyCODONE IR 5 MG tablet    ROXICODONE    30 tablet    Take 1 tablet (5 mg) by mouth every 4 hours as needed for moderate to severe pain    Kidney stones       PARoxetine 25 MG 24 hr tablet    PAXIL-CR    30 tablet    Take 1 tablet (25 mg) by mouth every morning    Episode of recurrent major depressive disorder, unspecified depression episode severity (H)       polyethylene glycol Packet    MIRALAX/GLYCOLAX     Take 17 g by mouth        PROSTAT PO      Take by mouth 2 times daily 15gm/1oz        traMADol 50 MG tablet    ULTRAM    28 tablet    Take 1 tablet (50 mg) by mouth every 6 hours as needed    Spasm of muscle       TYLENOL 325 MG tablet   Generic drug:  acetaminophen      Take 650 mg by mouth every 4 hours as needed for mild pain

## 2018-02-06 NOTE — PROGRESS NOTES
Intrathecal Pump Clinic Note    Aga Fraga is being seen in the Intrathecal Baclofen Pump Clinic for a pump refill for Spasticity secondary to MS.      VERIFICATION OF PATIENT IDENTIFICATION AND PROCEDURE     Initials   Patient Name lmd   Patient  lmd   Procedure Verified by: lmd     Prior to the start of the procedure and with procedural staff participation, I verbally confirmed the patient s identity using two indicators, relevant allergies, that the procedure was appropriate and matched the consent or emergent situation, and that the correct equipment/implants were available. Immediately prior to starting the procedure I conducted the Time Out with the procedural staff and re-confirmed the patient s name, procedure, and site/side. (The Joint Commission universal protocol was followed.)  Yes      INTERIM HISTORY:  Aga has been doing well since our last visit.  No new C/O but very concerned about retaining her condominium.  Says she is working with  but offers little help.    INTERIM PAST MEDICAL HISTORY:  There have not been other physician visits since our last appointment.  She IS NOT currently involved in therapy.  She is also working on ADL's for a home program.  She has made functional gains in NA.    REVIEW OF SYSTEMS:  She has pain managed by her baclofen pump and oral medications  She denies having bladder issues  She denies having issues with her bowels.  Patient denies side effects from the intrathecal Baclofen.    Current Outpatient Prescriptions   Medication Sig Dispense Refill     aspirin 81 MG tablet Take 81 mg by mouth       celecoxib (CELEBREX) 100 MG capsule Take 100 mg by mouth       cetirizine (ZYRTEC) 10 MG tablet Take 10 mg by mouth       benzonatate (TESSALON) 100 MG capsule Take 100 mg by mouth       loratadine (CLARITIN) 10 MG tablet Take 10 mg by mouth       nystatin (MYCOSTATIN) 692094 UNIT/GM POWD Apply 1 Application topically       DOCOSAHEXAENOIC ACID PO Take  1 g by mouth       polyethylene glycol (MIRALAX/GLYCOLAX) Packet Take 17 g by mouth       oxyCODONE (ROXICODONE) 5 MG IR tablet Take 1 tablet (5 mg) by mouth every 4 hours as needed for moderate to severe pain 30 tablet 0     cholecalciferol (VITAMIN D3) 1000 UNIT tablet Take 1,000 Units by mouth daily       Glatiramer Acetate (COPAXONE) 40 MG/ML SOSY Inject 1 mL Subcutaneous three times a week Every Tuesday, Thursday and Saturday       fluticasone (FLONASE) 50 MCG/ACT spray Spray 1 spray into both nostrils daily as needed for rhinitis or allergies       nystatin-triamcinolone (MYCOLOG II) cream Apply topically 2 times daily as needed       Omega-3 Fatty Acids (OMEGA-3 PO) Take 300-1,000 mg by mouth daily       Pollen Extracts (PROSTAT PO) Take by mouth 2 times daily 15gm/1oz       collagenase ointment Apply topically as needed (Wound care for pressure ulcers)       acetaminophen (TYLENOL) 325 MG tablet Take 650 mg by mouth every 4 hours as needed for mild pain       OMEPRAZOLE PO Take 20 mg by mouth 2 times daily (before meals)        ferrous sulfate (IRON) 325 (65 FE) MG tablet Take 325 mg by mouth daily (with breakfast)       traMADol (ULTRAM) 50 MG tablet Take 1 tablet (50 mg) by mouth every 6 hours as needed 28 tablet      gabapentin (NEURONTIN) 100 MG capsule Take 1-2 capsules (100-200 mg) by mouth 3 times daily Take 100 mg (1 capsule) by mouth in the morning, 100 mg at noon, 100 mg in the afternoon, and 200 mg (2 capsules) at bedtimeSee Admin Instructions Take 100 mg (1 capsule) by mouth in the morning, 100 mg at noon, 100 mg in the afternoon, and 200 mg (2 capsules) at bedtime 90 capsule 0     PARoxetine (PAXIL-CR) 25 MG 24 hr tablet Take 1 tablet (25 mg) by mouth every morning 30 tablet      atenolol (TENORMIN) 25 MG tablet Take 1 tablet (25 mg) by mouth 2 times daily 30 tablet      calcium carbonate (OS-EVELINE 500 MG [place]. [place]) 500 MG tablet Take 1 tablet (500 mg) by mouth daily 90 tablet      multivitamin,  therapeutic with minerals (THERA-VIT-M) TABS Take 1 tablet by mouth daily 30 each 0     baclofen (LIORESAL) 20 MG tablet Take 0.5 tablets (10 mg) by mouth 4 times daily as needed for muscle spasms (Patient taking differently: Take 10 mg by mouth 2 times daily Taken Every AM and PM. Also given up to three times throughout the day PRN) 120 tablet 3     methyl salicylate-menthol (BENGAY) OINT Apply 2 g topically every 6 hours as needed (pain). 1 Tube 2       INTERIM SOCIAL HISTORY:  Social situation has not changed since the last visit.  She is unemployed.  She is living in a nursing home, ([facility]).      PHYSICAL EXAMINATION:  Interrogation of the Baclofen  pump shows that it is currently running at a Flex (complex continuous) dose basal rate of 27.0mcg/hr and 35.0 mcg/hour over 5 hours; 35.4 mcg/hour over 5 hours for total dose of 730.4 mcg/day.  Pump site is intact with no signs of infection, redness, swelling or seroma.      Clonus is not present.      Musculoskeletal contractures are present in the knees and ankles. She has bilateral knee flexion contractures with bilateral plantar flexion contractures, and her right ankle has an inversion flexion contracture.      Gait examination showed non-ambulatory.      Upper extremity motor control:  Unchanged from previous exam.      Lower extremity motor control/gait:  Unchanged from previous exam.      Oral motor control/speech:  Is optimized      Pain:  Is under adequate control.      Psychosocial:  Status is unchanged from the previous visit    IMPRESSION/PLAN:  Visit Reason: Pump Refill  NDC #: 2000 (44176-267-52)  Sterile Prep & Drape: Yes  Mililiters Withdrawn: 3.0  Mililiters Expected: 2.9  Previous Dose: Flex dosing (basal 27.0 mcg/hr), with flex dosing to equal 730.4 mcg/day  New Dose: Flex dosing  (basal 27.0 mcg/hr), with flex dosing to equal 730.4 mcg/day  Alarm Date: 3/26/2018  Spasticity: Optimal  Therapeutic Level: Optimal  Symptoms: none  noted   Estimated LES: 35months    Next pump refill appointment 3/21/2018      Niru Yun RN  Under the supervision of  Gigi Bassett MD  Department of Physical Medicine and Rehabilitation

## 2018-03-06 NOTE — TELEPHONE ENCOUNTER
Patient is coming in to see Lynn eLwis PA-C for NGB with imaging and labs before, no need for a call.

## 2018-03-21 NOTE — PROGRESS NOTES
"Intrathecal Pump Clinic Note    Aga Fraga is being seen in the Intrathecal Baclofen Pump Clinic for a pump refill for Spasticity secondary to MS.      VERIFICATION OF PATIENT IDENTIFICATION AND PROCEDURE     Initials   Patient Name lmd   Patient  lmd   Procedure Verified by: bonnie     Prior to the start of the procedure and with procedural staff participation, I verbally confirmed the patient s identity using two indicators, relevant allergies, that the procedure was appropriate and matched the consent or emergent situation, and that the correct equipment/implants were available. Immediately prior to starting the procedure I conducted the Time Out with the procedural staff and re-confirmed the patient s name, procedure, and site/side. (The Joint Commission universal protocol was followed.)  Yes    INTERIM HISTORY:  Aga has not been doing well since our last visit.  She complains of chronic UTI's.  Very unhappy about losing her condo due to finances.  Feels her finances were mismanaged and she is now \"stuck\" at her present living facility.    INTERIM PAST MEDICAL HISTORY:  There have not been other physician visits since our last appointment.  She IS NOT currently involved in therapy.  She is also working on ADL's for a home program.  She has made functional gains in NA.    REVIEW OF SYSTEMS:  She has pain managed by her baclofen pump and oral medications  She is having bladder issues with frequent UTI's  She denies having issues with her bowels.  Patient denies side effects from the intrathecal Baclofen.    Current Outpatient Prescriptions   Medication Sig Dispense Refill     aspirin 81 MG tablet Take 81 mg by mouth       celecoxib (CELEBREX) 100 MG capsule Take 100 mg by mouth       cetirizine (ZYRTEC) 10 MG tablet Take 10 mg by mouth       benzonatate (TESSALON) 100 MG capsule Take 100 mg by mouth       loratadine (CLARITIN) 10 MG tablet Take 10 mg by mouth       nystatin (MYCOSTATIN) 788940 UNIT/GM " POWD Apply 1 Application topically       DOCOSAHEXAENOIC ACID PO Take 1 g by mouth       polyethylene glycol (MIRALAX/GLYCOLAX) Packet Take 17 g by mouth       oxyCODONE (ROXICODONE) 5 MG IR tablet Take 1 tablet (5 mg) by mouth every 4 hours as needed for moderate to severe pain 30 tablet 0     cholecalciferol (VITAMIN D3) 1000 UNIT tablet Take 1,000 Units by mouth daily       Glatiramer Acetate (COPAXONE) 40 MG/ML SOSY Inject 1 mL Subcutaneous three times a week Every Tuesday, Thursday and Saturday       fluticasone (FLONASE) 50 MCG/ACT spray Spray 1 spray into both nostrils daily as needed for rhinitis or allergies       nystatin-triamcinolone (MYCOLOG II) cream Apply topically 2 times daily as needed       Omega-3 Fatty Acids (OMEGA-3 PO) Take 300-1,000 mg by mouth daily       Pollen Extracts (PROSTAT PO) Take by mouth 2 times daily 15gm/1oz       collagenase ointment Apply topically as needed (Wound care for pressure ulcers)       acetaminophen (TYLENOL) 325 MG tablet Take 650 mg by mouth every 4 hours as needed for mild pain       OMEPRAZOLE PO Take 20 mg by mouth 2 times daily (before meals)        ferrous sulfate (IRON) 325 (65 FE) MG tablet Take 325 mg by mouth daily (with breakfast)       traMADol (ULTRAM) 50 MG tablet Take 1 tablet (50 mg) by mouth every 6 hours as needed 28 tablet      gabapentin (NEURONTIN) 100 MG capsule Take 1-2 capsules (100-200 mg) by mouth 3 times daily Take 100 mg (1 capsule) by mouth in the morning, 100 mg at noon, 100 mg in the afternoon, and 200 mg (2 capsules) at bedtimeSee Admin Instructions Take 100 mg (1 capsule) by mouth in the morning, 100 mg at noon, 100 mg in the afternoon, and 200 mg (2 capsules) at bedtime 90 capsule 0     PARoxetine (PAXIL-CR) 25 MG 24 hr tablet Take 1 tablet (25 mg) by mouth every morning 30 tablet      atenolol (TENORMIN) 25 MG tablet Take 1 tablet (25 mg) by mouth 2 times daily 30 tablet      calcium carbonate (OS-EVELINE 500 MG [place]. [place]) 500 MG tablet  Take 1 tablet (500 mg) by mouth daily 90 tablet      multivitamin, therapeutic with minerals (THERA-VIT-M) TABS Take 1 tablet by mouth daily 30 each 0     baclofen (LIORESAL) 20 MG tablet Take 0.5 tablets (10 mg) by mouth 4 times daily as needed for muscle spasms (Patient taking differently: Take 10 mg by mouth 2 times daily Taken Every AM and PM. Also given up to three times throughout the day PRN) 120 tablet 3     methyl salicylate-menthol (BENGAY) OINT Apply 2 g topically every 6 hours as needed (pain). 1 Tube 2       INTERIM SOCIAL HISTORY:  Social situation has not changed since the last visit.  She is unemployed.  She is living in a nursing home, ([facility]).      PHYSICAL EXAMINATION:  Interrogation of the Baclofen  pump shows that it is currently running at a Flex (complex continuous) dose basal rate of 27.0mcg/hr and 35.0 mcg/hour over 5 hours; 35.4 mcg/hour over 5 hours for total dose of 730.4 mcg/day.  Pump site is intact with no signs of infection, redness, swelling or seroma.      Clonus is not present.      Musculoskeletal contractures are present in the knees and ankles. She has bilateral knee flexion contractures with bilateral plantar flexion contractures, and her right ankle has an inversion flexion contracture.      Gait examination showed non-ambulatory.      Upper extremity motor control:  Unchanged from previous exam.      Lower extremity motor control/gait:  Unchanged from previous exam.      Oral motor control/speech:  Is optimized      Pain:  Is under adequate control.      Psychosocial:  Status is unchanged from the previous visit    IMPRESSION/PLAN:  Visit Reason: Pump Refill  NDC #: 2000 ([id number])  Sterile Prep & Drape: Yes  Mililiters Withdrawn: 4.0  Mililiters Expected: 4.0  Previous Dose: Flex dosing (basal 27.0 mcg/hr), with flex dosing to equal 730.4 mcg/day  New Dose: Flex dosing  (basal 27.0 mcg/hr), with flex dosing to equal 730.4 mcg/day  Alarm Date:  5/9/2018  Spasticity: Optimal  Therapeutic Level: Optimal  Symptoms: none noted   Estimated LES: 34months    Next pump refill appointment 5/7/2018    Niru Yun RN  Under the supervision of  Gigi Bassett MD  Department of Physical Medicine and Rehabilitation

## 2018-03-21 NOTE — MR AVS SNAPSHOT
After Visit Summary   3/21/2018    Aga Fraga    MRN: [id number]           Patient Information     Date Of Birth          1950        Visit Information        Provider Department      3/21/2018 1:00 PM Nurse, [facility] P[facility]        Today's Diagnoses     MS (multiple sclerosis) (H)    -  1    Spasm of muscle        Presence of implanted infusion pump           Follow-ups after your visit        Follow-up notes from your care team     Return in about 7 weeks (around 5/7/2018) for Baclofen Pump refill.      Your next 10 appointments already scheduled     Mar 26, 2018 11:00 AM CDT   LAB with  LAB   [facility] ([facility])    [address] [address] [phone]           Please do not eat 10-12 hours before your appointment if you are coming in fasting for labs on lipids, cholesterol, or glucose (sugar). This does not apply to pregnant women. Water, hot tea and black coffee (with nothing added) are okay. Do not drink other fluids, diet soda or chew gum.            Mar 26, 2018 11:15 AM CDT   US RENAL COMPLETE with US2   [facility] ([facility])    [address] [address] [phone]           Please bring a list of your medicines (including vitamins, minerals and over-the-counter drugs). Also, tell your doctor about any allergies you may have. Wear comfortable clothes and leave your valuables at home.  You do not need to do anything special to prepare for your exam.  Please call the Imaging Department at your exam site with any questions.            Mar 26, 2018 12:30 PM CDT   (Arrive by 12:15 PM)   Return Visit with Lynn Lewis PA-C   [facility] ([facility])    [address] [place] [address] [phone]             May 07, 2018  1:00 PM CDT   (Arrive by 12:45 PM)   Return Visit with  Pmr Nurse   [facility] ([facility] [place])    [address] [address] [phone]              Who to contact     Please call your clinic at [phone] to:    Ask questions about your health    Make or cancel appointments    Discuss your medicines    Learn about your test results    Speak to your doctor            Additional Information About Your Visit        MyChart Information     [employer] is an electronic gateway that provides easy, online access to your medical records. With [employer], you can request a clinic appointment, read your test results, renew a prescription or communicate with your care team.     To sign up for [employer] visit the website at [email or web address]   You will be asked to enter the access code listed below, as well as some personal information. Please follow the directions to create your username and password.     Your access code is: [email or web address]  Expires: 2018  2:28 PM     Your access code will  in 90 days. If you need help or a new code, please contact your [facility] or call [phone] for assistance.        Care EveryWhere ID     This is your Care EveryWhere ID. This could be used by other organizations to access your [facility] medical records  [id number]         Blood Pressure from Last 3 Encounters:   17 108/54   17 120/66   17 122/54    Weight from Last 3 Encounters:   17 62.6 kg (138 lb)   17 63.5 kg (140 lb)   17 63 kg (139 lb)              Today, you had the following     No orders found for display         Today's Medication Changes          These changes are accurate as of 3/21/18 11:59 PM.  If you have any questions, ask your nurse or doctor.               These medicines have changed or have updated prescriptions.         Dose/Directions    baclofen 20 MG tablet   Commonly known as:  LIORESAL   This may have changed:    - when to take this  - additional instructions   Used for:  Multiple sclerosis (H), Muscle spasm        Dose:  10 mg   Take 0.5 tablets (10 mg) by mouth 4 times daily as needed for muscle spasms   Quantity:  120 tablet   Refills:  3                Primary Care Provider Office Phone # Fax #    Astrid Marroquin [phone]       [facility] AFTER HOURS [address] [place] [address]        Equal Access to Services     NOE ACE : Hadii aad ku hadasho Soomaali, waaxda luqadaha, qaybta kaalmada adeegyada, waxay idiin hayaan ademello leos . So [facility] [phone].    ATENCIÓN: Si habla español, tiene a urena disposición servicios gratuitos de asistencia lingüística. [facility] [phone].    We comply with applicable federal civil rights laws and [place] laws. We do not discriminate on the basis of race, color, national origin, age, disability, sex, sexual orientation, or gender identity.            Thank you!     Thank you for choosing [facility] PHYSICAL MEDICINE AND REHABILITATION  for your care. Our goal is always to provide you with excellent care. Hearing back from our patients is one way we can continue to improve our services. Please take a few minutes to complete the written survey that you may receive in the mail after your visit with us. Thank you!             Your Updated Medication List - Protect others around you: Learn how to safely use, store and throw away your medicines at www.disposemymeds.org.          This list is accurate as of 3/21/18 11:59 PM.  Always use your most recent med list.                   Brand Name Dispense Instructions for use Diagnosis    aspirin 81 MG tablet      Take 81 mg by mouth        atenolol 25 MG tablet    TENORMIN    30 tablet    Take 1 tablet (25 mg) by mouth 2 times daily    HOCM (hypertrophic obstructive cardiomyopathy) (H)       baclofen 20  MG tablet    LIORESAL    120 tablet    Take 0.5 tablets (10 mg) by mouth 4 times daily as needed for muscle spasms    Multiple sclerosis (H), Muscle spasm       benzonatate 100 MG capsule    TESSALON     Take 100 mg by mouth        calcium carbonate 1250 MG tablet    OS-EVELINE 500 mg [place]. Ca    90 tablet    Take 1 tablet (500 mg) by mouth daily    MS (multiple sclerosis) (H)       celecoxib 100 MG capsule    celeBREX     Take 100 mg by mouth        cetirizine 10 MG tablet    zyrTEC     Take 10 mg by mouth        cholecalciferol 1000 UNIT tablet    vitamin D3     Take 1,000 Units by mouth daily        collagenase ointment    SANTYL     Apply topically as needed (Wound care for pressure ulcers)        COPAXONE 40 MG/ML Sosy   Generic drug:  Glatiramer Acetate      Inject 1 mL Subcutaneous three times a week Every Tuesday, Thursday and Saturday        DOCOSAHEXAENOIC ACID PO      Take 1 g by mouth        ferrous sulfate 325 (65 FE) MG tablet    IRON     Take 325 mg by mouth daily (with breakfast)        fluticasone 50 MCG/ACT spray    FLONASE     Spray 1 spray into both nostrils daily as needed for rhinitis or allergies        gabapentin 100 MG capsule    NEURONTIN    90 capsule    Take 1-2 capsules (100-200 mg) by mouth 3 times daily Take 100 mg (1 capsule) by mouth in the morning, 100 mg at noon, 100 mg in the afternoon, and 200 mg (2 capsules) at bedtimeSee Admin Instructions Take 100 mg (1 capsule) by mouth in the morning, 100 mg at noon, 100 mg in the afternoon, and 200 mg (2 capsules) at bedtime    Spasm of muscle       loratadine 10 MG tablet    CLARITIN     Take 10 mg by mouth        methyl salicylate-menthol Oint     1 Tube    Apply 2 g topically every 6 hours as needed (pain).    Multiple scleroses       multivitamin, therapeutic with minerals Tabs tablet     30 each    Take 1 tablet by mouth daily    MS (multiple sclerosis) (H)       nystatin 847136 UNIT/GM Powd    MYCOSTATIN     Apply 1 Application topically         nystatin-triamcinolone cream    MYCOLOG II     Apply topically 2 times daily as needed        OMEGA-3 PO      Take 300-1,000 mg by mouth daily        OMEPRAZOLE PO      Take 20 mg by mouth 2 times daily (before meals)        oxyCODONE IR 5 MG tablet    ROXICODONE    30 tablet    Take 1 tablet (5 mg) by mouth every 4 hours as needed for moderate to severe pain    Kidney stones       PARoxetine 25 MG 24 hr tablet    PAXIL-CR    30 tablet    Take 1 tablet (25 mg) by mouth every morning    Episode of recurrent major depressive disorder, unspecified depression episode severity (H)       polyethylene glycol Packet    MIRALAX/GLYCOLAX     Take 17 g by mouth        PROSTAT PO      Take by mouth 2 times daily 15gm/1oz        traMADol 50 MG tablet    ULTRAM    28 tablet    Take 1 tablet (50 mg) by mouth every 6 hours as needed    Spasm of muscle       TYLENOL 325 MG tablet   Generic drug:  acetaminophen      Take 650 mg by mouth every 4 hours as needed for mild pain

## 2018-03-26 NOTE — PATIENT INSTRUCTIONS
Preventive Care:    Colorectal Cancer Screening: During our visit today, we discussed that it is recommended you receive colorectal cancer screening. Please call or make an appointment with your primary care provider to discuss this. You may also call the [facility] scheduling line ([phone]) to set up a colonoscopy appointment.

## 2018-03-26 NOTE — PROGRESS NOTES
"UROLOGY OFFICE VISIT - FOLLOW UP    REASON FOR VISIT: neurogenic bladder with urinary incontinence follow up    HPI: Aga Fraga is a 67-year-old woman with multiple sclerosis and a Mitrofanoff created in 2000. This was later converted to a Alli channel in the same year for stenosis. She was bothered by incontinence and so urodynamics were done in September 2016. This showed no detrusor overactivity and there was leakage with Valsalva up to only 30 cm of water at low bladder volumes of about 100 cc. She underwent Deflux injection into the channel in October 2016 and had significant improvement in her symptoms but now her incontinence has returned. Last visit with Dr. Ruelas on 9/25/17. At that time, her degree of leakage was minimal (managed with 1-2 Kotex pads per day) and so it was mutually decided to observe.     She returns today for symptom recheck and to review results of a renal ultrasound and blood work. She reports worsening of her urinary incontinence per Alli channel over the past 6 months. Now having to use 3-5 pads per day. The leakage seems to be somewhat continuous but worsens with stress maneuvers. She denies leakage per urethra. Denies hematuria or recent UTI's. She is catheterizing 4 times per day. Volumes range from 300-400 mL.     Also of note, she developed a staghorn calculus last year and this was treated with a few surgeries by Dr. Hyde. No stones since then.      PEx  /66  Pulse 67  Ht 1.651 m (5' 5\")  Wt 63.5 kg (140 lb)  BMI 23.3 kg/m2  GEN: well-appearing female in NAD resting comfortably in a wheelchair  RESP: no increased respiratory effort  ABD: soft, NT, ND. Alli stoma site clean without evidence for stenosis. There is some urine noted to dribble out when she coughs.     Last Basic Metabolic Panel:  Lab Results   Component Value Date     03/26/2018      Lab Results   Component Value Date    POTASSIUM 4.2 03/26/2018     Lab Results   Component Value Date "    CHLORIDE 101 03/26/2018     Lab Results   Component Value Date    EVELINE 9.7 03/26/2018     Lab Results   Component Value Date    CO2 32 03/26/2018     Lab Results   Component Value Date    BUN 26 03/26/2018     Lab Results   Component Value Date    CR 0.41 03/26/2018     Lab Results   Component Value Date    GLC 99 03/26/2018       IMAGING  ULTRASOUND RENAL COMPLETE, 3/26/2018   Right kidney: Measures 9.2 cm in length. Parenchyma is of normal  thickness and echogenicity. No focal mass. No hydronephrosis.     Left kidney: Measures 8.1 cm in length. The left kidney is not well  seen. However there is no evidence for hydronephrosis. No solid lesion  is identified, however images are limited.     Bladder: Unremarkable.  Previously seen dependent bladder calculus  described in 2017 is not visualized.      IMPRESSION: No hydronephrosis.      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 This was helped significantly by Deflux injection in 10/2016. Incontinence has now worsened and she would like to consider repeat Deflux injection. No evidence for stones on today's renal u/s.     Plan:  -Message sent to Dr. Ruelas about scheduling repeat Deflux injection into the Alli channel.     Otherwise, plan to follow up with me in 1 year with renal u/s and BMP, B12 beforehand.    15 minutes spent with the patient, >50% in counseling and coordination of care.    Lynn Lewis PA-C  Department of Urology

## 2018-03-26 NOTE — NURSING NOTE
Chief Complaint   Patient presents with     RECHECK     NGB with imaging and labs before       Lakisha Chambers MA

## 2018-03-26 NOTE — LETTER
"3/26/2018       RE: Aga Fraga  [address]  [place] [address]     Dear Colleague,    Thank you for referring your patient, Aga Fraga, to the [facility] AND [facility]ST FOR PROSTATE AND UROLOGIC CANCERS at [facility]. Please see a copy of my visit note below.    UROLOGY OFFICE VISIT - FOLLOW UP    REASON FOR VISIT: neurogenic bladder with urinary incontinence follow up    HPI: Aga Fraga is a 67-year-old woman with multiple sclerosis and a Mitrofanoff created in 2000. This was later converted to a Alli channel in the same year for stenosis. She was bothered by incontinence and so urodynamics were done in September 2016. This showed no detrusor overactivity and there was leakage with Valsalva up to only 30 cm of water at low bladder volumes of about 100 cc. She underwent Deflux injection into the channel in October 2016 and had significant improvement in her symptoms but now her incontinence has returned. Last visit with Dr. Ruelas on 9/25/17. At that time, her degree of leakage was minimal (managed with 1-2 Kotex pads per day) and so it was mutually decided to observe.     She returns today for symptom recheck and to review results of a renal ultrasound and blood work. She reports worsening of her urinary incontinence per Alli channel over the past 6 months. Now having to use 3-5 pads per day. The leakage seems to be somewhat continuous but worsens with stress maneuvers. She denies leakage per urethra. Denies hematuria or recent UTI's. She is catheterizing 4 times per day. Volumes range from 300-400 mL.     Also of note, she developed a staghorn calculus last year and this was treated with a few surgeries by Dr. Hyde. No stones since then.      PEx  /66  Pulse 67  Ht 1.651 m (5' 5\")  Wt 63.5 kg (140 lb)  BMI 23.3 kg/m2  GEN: well-appearing female in NAD resting comfortably in a wheelchair  RESP: no increased respiratory effort  ABD: " soft, NT, ND. Alli stoma site clean without evidence for stenosis. There is some urine noted to dribble out when she coughs.     Last Basic Metabolic Panel:  Lab Results   Component Value Date     03/26/2018      Lab Results   Component Value Date    POTASSIUM 4.2 03/26/2018     Lab Results   Component Value Date    CHLORIDE 101 03/26/2018     Lab Results   Component Value Date    EVELINE 9.7 03/26/2018     Lab Results   Component Value Date    CO2 32 03/26/2018     Lab Results   Component Value Date    BUN 26 03/26/2018     Lab Results   Component Value Date    CR 0.41 03/26/2018     Lab Results   Component Value Date    GLC 99 03/26/2018       IMAGING  ULTRASOUND RENAL COMPLETE, 3/26/2018   Right kidney: Measures 9.2 cm in length. Parenchyma is of normal  thickness and echogenicity. No focal mass. No hydronephrosis.     Left kidney: Measures 8.1 cm in length. The left kidney is not well  seen. However there is no evidence for hydronephrosis. No solid lesion  is identified, however images are limited.     Bladder: Unremarkable.  Previously seen dependent bladder calculus  described in 2017 is not visualized.      IMPRESSION: No hydronephrosis.      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 This was helped significantly by Deflux injection in 10/2016. Incontinence has now worsened and she would like to consider repeat Deflux injection. No evidence for stones on today's renal u/s.     Plan:  -Message sent to Dr. Ruelas about scheduling repeat Deflux injection into the Alli channel.     Otherwise, plan to follow up with me in 1 year with renal u/s and BMP, B12 beforehand.    15 minutes spent with the patient, >50% in counseling and coordination of care.      Again, thank you for allowing me to participate in the care of your patient.      Sincerely,    Lynn Lewis PA-C

## 2018-04-13 NOTE — TELEPHONE ENCOUNTER
Surgery scheduled on 4/27/18 @ 7:15 am @ [facility] OR with Dr Osuna.  Surgery packet mailed on 4/13/18.  Patient to be informed by RN care coordinator Cathie during phone call for surgery teaching.

## 2018-04-17 NOTE — PROGRESS NOTES
Upcoming surgical procedure with Dr Chris Osuna on 4/27/18 at 0715, check in at 0545.   Surgery at ([facility] or [place]): [facility]  Patient is having a Cystoscopy with Deflux into Mitrofanoff   Patient has a responsible adult to drive home and stay with them for 24 hours: Yes  Pre-op physical completed: YES. Date-4/19/18.  PAC: No  Bowel Prep: No, not needed  Urine culture completed: YES. Date- 4/19/18.  Mixture of urogenital organisms.  No susceptibility test done.  Patient is asymptomatic.   Post-operative appointment needed: No, not needed  All questions answered.    Patient verbalized understanding. No further questions.      Cathie Ramos RN  Care Coordinator - Reconstructive Urology  [phone]

## 2018-05-07 NOTE — PROGRESS NOTES
Intrathecal Pump Clinic Note    Aga Fraga is being seen in the Intrathecal Baclofen Pump Clinic for a pump refill for Spasticity secondary to MS.      VERIFICATION OF PATIENT IDENTIFICATION AND PROCEDURE     Initials   Patient Name lmd   Patient  lmd   Procedure Verified by: lmd     Prior to the start of the procedure and with procedural staff participation, I verbally confirmed the patient s identity using two indicators, relevant allergies, that the procedure was appropriate and matched the consent or emergent situation, and that the correct equipment/implants were available. Immediately prior to starting the procedure I conducted the Time Out with the procedural staff and re-confirmed the patient s name, procedure, and site/side. (The Joint Commission universal protocol was followed.)  Yes    INTERIM HISTORY:  Aga has been doing well since our last visit.  Is now on O2 consistently and feels slightly better.  States every time she gets sick it lands in her lungs and she gets pneumonia.    Unhappy about having to stay at her present facility and wishes she could return home.    INTERIM PAST MEDICAL HISTORY:  There have been other physician visits since our last appointment.  Pt has seen urology for cystoscopy due to Mitrofanoff leaking.  She IS NOT currently involved in therapy.  She is also working on ADL's for a home program.  She has made functional gains in NA.    REVIEW OF SYSTEMS:  She has pain managed with oral meds and Baclofen Pump.  She is having bladder issues with frequent UTI's  She denies having issues with her bowels.  Patient denies side effects from the intrathecal Baclofen.    Current Outpatient Prescriptions   Medication Sig Dispense Refill     acetaminophen (TYLENOL) 325 MG tablet Take 650 mg by mouth every 4 hours as needed for mild pain       albuterol (ACCUNEB) 1.25 MG/3ML nebulizer solution Take 1 vial by nebulization every 4 hours as needed for shortness of breath / dyspnea  or wheezing       atenolol (TENORMIN) 25 MG tablet Take 1 tablet (25 mg) by mouth 2 times daily 30 tablet      baclofen (LIORESAL) 20 MG tablet Take 0.5 tablets (10 mg) by mouth 4 times daily as needed for muscle spasms (Patient taking differently: Take 10 mg by mouth 2 times daily Taken Every AM and PM. Also given up to three times throughout the day PRN) 120 tablet 3     BENZONATATE PO Take 100 mg by mouth       cholecalciferol (VITAMIN D3) 1000 UNIT tablet Take 1,000 Units by mouth daily       collagenase ointment Apply topically as needed (Wound care for pressure ulcers)       ferrous sulfate (IRON) 325 (65 FE) MG tablet Take 325 mg by mouth daily (with breakfast)       fluticasone (FLONASE) 50 MCG/ACT spray Spray 1 spray into both nostrils daily as needed for rhinitis or allergies       Fluticasone-Salmeterol (ADVAIR DISKUS IN) Inhale 1 puff into the lungs 2 times daily       gabapentin (NEURONTIN) 100 MG capsule Take 1-2 capsules (100-200 mg) by mouth 3 times daily Take 100 mg (1 capsule) by mouth in the morning, 100 mg at noon, 100 mg in the afternoon, and 200 mg (2 capsules) at bedtimeSee Admin Instructions Take 100 mg (1 capsule) by mouth in the morning, 100 mg at noon, 100 mg in the afternoon, and 200 mg (2 capsules) at bedtime (Patient taking differently: Take 100-200 mg by mouth Take 100 mg (1 capsule) by mouth in the morning, 100 mg at noon, 100 mg in the afternoon, and 200 mg (2 capsules) at bedtimeSee Admin Instructions Take 100 mg (1 capsule) by mouth in the morning, 100 mg at noon, 100 mg in the afternoon, and 200 mg (2 capsules) at bedtime) 90 capsule 0     Glatiramer Acetate (COPAXONE) 40 MG/ML SOSY Inject 1 mL Subcutaneous three times a week Every Tuesday, Thursday and Saturday       loratadine (CLARITIN) 10 MG tablet Take 10 mg by mouth       multivitamin, therapeutic with minerals (THERA-VIT-M) TABS Take 1 tablet by mouth daily 30 each 0     nystatin-triamcinolone (MYCOLOG II) cream Apply  topically 2 times daily as needed       Omega-3 Fatty Acids (OMEGA-3 PO) Take 300-1,000 mg by mouth daily       OMEPRAZOLE PO Take 20 mg by mouth every morning        PARoxetine (PAXIL-CR) 25 MG 24 hr tablet Take 1 tablet (25 mg) by mouth every morning (Patient taking differently: Take 10 mg by mouth every morning ) 30 tablet      polyethylene glycol (MIRALAX/GLYCOLAX) Packet Take 17 g by mouth       traMADol (ULTRAM) 50 MG tablet Take 1 tablet (50 mg) by mouth every 6 hours as needed (Patient taking differently: Take 50 mg by mouth 3 times daily ) 28 tablet        INTERIM SOCIAL HISTORY:  Social situation has not changed since the last visit.  She is unemployed.  She is living in a nursing home, ([facility]).      PHYSICAL EXAMINATION:  Interrogation of the Baclofen  pump shows that it is currently running at a Flex (complex continuous) dose basal rate of 27.0mcg/hr and 35.0 mcg/hour over 5 hours; 35.4 mcg/hour over 5 hours for total dose of 730.4 mcg/day.  Pump site is intact with no signs of infection, redness, swelling or seroma.      Clonus is not present.      Musculoskeletal contractures are present in the knees and ankles. She has bilateral knee flexion contractures with bilateral plantar flexion contractures, and her right ankle has an inversion flexion contracture.      Gait examination showed non-ambulatory.      Upper extremity motor control:  Unchanged from previous exam.      Lower extremity motor control/gait:  Unchanged from previous exam.      Oral motor control/speech:  Is optimized      Pain:  Is under adequate control.      Psychosocial:  Status is unchanged from the previous visit    IMPRESSION/PLAN:  Visit Reason: Pump Refill  NDC #: 2000 ([id number])  Sterile Prep & Drape: Yes  Mililiters Withdrawn: 3.0  Mililiters Expected: 2.9  Previous Dose: Flex dosing (basal 27.0 mcg/hr), with flex dosing to equal 730.4 mcg/day  New Dose: Flex dosing  (basal 27.0 mcg/hr), with flex dosing  to equal 730.4 mcg/day  Alarm Date: 6/25/2018  Spasticity: Optimal  Therapeutic Level: Optimal  Symptoms: none noted   Estimated LES: 32months    Next pump refill appointment 6/20/2018      Niru Yun RN  Under the supervision of  Gigi Bassett MD  Department of Physical Medicine and Rehabilitation

## 2018-05-07 NOTE — MR AVS SNAPSHOT
After Visit Summary   2018    Aga Fraga    MRN: [id number]           Patient Information     Date Of Birth          1950        Visit Information        Provider Department      2018 1:00 PM Nurse, Malcolm Ruby [facility] Physical Medicine and Rehabilitation        Today's Diagnoses     MS (multiple sclerosis) (H)    -  1    Spasm of muscle        Presence of implanted infusion pump           Follow-ups after your visit        Follow-up notes from your care team     Return in about 6 weeks (around 2018) for Baclofen Pump refill.      Your next 10 appointments already scheduled     Oct 29, 2018 10:00 AM CDT   (Arrive by 9:45 AM)   Return Visit with Lynn Lewis PA-C   [facility] Urology and [facility] for Prostate and Urologic Cancers ([facility])    [address] [place] [address] [phone]              Who to contact     Please call your clinic at [phone] to:    Ask questions about your health    Make or cancel appointments    Discuss your medicines    Learn about your test results    Speak to your doctor            Additional Information About Your Visit        [employer] Information     [employer] is an electronic gateway that provides easy, online access to your medical records. With [employer], you can request a clinic appointment, read your test results, renew a prescription or communicate with your care team.     To sign up for [employer] visit the website at [email or web address]   You will be asked to enter the access code listed below, as well as some personal information. Please follow the directions to create your username and password.     Your access code is: [email or web address]  Expires: 2018  2:28 PM     Your access code will  in 90 days. If you need help or a new code, please contact your [facility] or call [phone] for assistance.        Care EveryWhere ID     This is your  Care EveryWhere ID. This could be used by other organizations to access your [place] medical records  [id number]         Blood Pressure from Last 3 Encounters:   04/27/18 134/65   03/26/18 123/66   09/25/17 108/54    Weight from Last 3 Encounters:   04/27/18 63.5 kg (140 lb)   03/26/18 63.5 kg (140 lb)   09/25/17 62.6 kg (138 lb)              Today, you had the following     No orders found for display         Today's Medication Changes          These changes are accurate as of 5/7/18  2:10 PM.  If you have any questions, ask your nurse or doctor.               These medicines have changed or have updated prescriptions.        Dose/Directions    baclofen 20 MG tablet   Commonly known as:  LIORESAL   This may have changed:    - when to take this  - additional instructions   Used for:  Multiple sclerosis (H), Muscle spasm        Dose:  10 mg   Take 0.5 tablets (10 mg) by mouth 4 times daily as needed for muscle spasms   Quantity:  120 tablet   Refills:  3       gabapentin 100 MG capsule   Commonly known as:  NEURONTIN   This may have changed:    - when to take this  - additional instructions   Used for:  Spasm of muscle        Dose:  100-200 mg   Take 1-2 capsules (100-200 mg) by mouth 3 times daily Take 100 mg (1 capsule) by mouth in the morning, 100 mg at noon, 100 mg in the afternoon, and 200 mg (2 capsules) at bedtimeSee Admin Instructions Take 100 mg (1 capsule) by mouth in the morning, 100 mg at noon, 100 mg in the afternoon, and 200 mg (2 capsules) at bedtime   Quantity:  90 capsule   Refills:  0       PARoxetine 25 MG 24 hr tablet   Commonly known as:  PAXIL-CR   This may have changed:  how much to take   Used for:  Episode of recurrent major depressive disorder, unspecified depression episode severity (H)        Dose:  25 mg   Take 1 tablet (25 mg) by mouth every morning   Quantity:  30 tablet   Refills:  0       traMADol 50 MG tablet   Commonly known as:  ULTRAM   This may have changed:  when to take this    Used for:  Spasm of muscle        Dose:  50 mg   Take 1 tablet (50 mg) by mouth every 6 hours as needed   Quantity:  28 tablet   Refills:  0                Primary Care Provider Office Phone # Fax #    Astrid Marroquin [phone]       [facility] AFTER HOURS [address] [place] [address]        Equal Access to Services     [facility] : Hadii aad ku hadasho Soomaali, waaxda luqadaha, qaybta kaalmada adeegyada, waxay idiin hayaan adeeg kharash laLazaan . So [facility] [phone].    ATENCIÓN: Si habla español, tiene a urena disposición servicios gratuitos de asistencia lingüística. Llame al [phone].    We comply with applicable federal civil rights laws and [place] laws. We do not discriminate on the basis of race, color, national origin, age, disability, sex, sexual orientation, or gender identity.            Thank you!     Thank you for choosing [facility] PHYSICAL MEDICINE AND REHABILITATION  for your care. Our goal is always to provide you with excellent care. Hearing back from our patients is one way we can continue to improve our services. Please take a few minutes to complete the written survey that you may receive in the mail after your visit with us. Thank you!             Your Updated Medication List - Protect others around you: Learn how to safely use, store and throw away your medicines at www.disposemymeds.org.          This list is accurate as of 5/7/18  2:10 PM.  Always use your most recent med list.                   Brand Name Dispense Instructions for use Diagnosis    ADVAIR DISKUS IN      Inhale 1 puff into the lungs 2 times daily        albuterol 1.25 MG/3ML nebulizer solution    ACCUNEB     Take 1 vial by nebulization every 4 hours as needed for shortness of breath / dyspnea or wheezing        atenolol 25 MG tablet    TENORMIN    30 tablet    Take 1 tablet (25 mg) by mouth 2 times daily    HOCM (hypertrophic obstructive cardiomyopathy) (H)       baclofen 20 MG tablet     LIORESAL    120 tablet    Take 0.5 tablets (10 mg) by mouth 4 times daily as needed for muscle spasms    Multiple sclerosis (H), Muscle spasm       BENZONATATE PO      Take 100 mg by mouth        cholecalciferol 1000 UNIT tablet    vitamin D3     Take 1,000 Units by mouth daily        collagenase ointment    SANTYL     Apply topically as needed (Wound care for pressure ulcers)        COPAXONE 40 MG/ML Sosy   Generic drug:  Glatiramer Acetate      Inject 1 mL Subcutaneous three times a week Every Tuesday, Thursday and Saturday        ferrous sulfate 325 (65 Fe) MG tablet    IRON     Take 325 mg by mouth daily (with breakfast)        fluticasone 50 MCG/ACT spray    FLONASE     Spray 1 spray into both nostrils daily as needed for rhinitis or allergies        gabapentin 100 MG capsule    NEURONTIN    90 capsule    Take 1-2 capsules (100-200 mg) by mouth 3 times daily Take 100 mg (1 capsule) by mouth in the morning, 100 mg at noon, 100 mg in the afternoon, and 200 mg (2 capsules) at bedtimeSee Admin Instructions Take 100 mg (1 capsule) by mouth in the morning, 100 mg at noon, 100 mg in the afternoon, and 200 mg (2 capsules) at bedtime    Spasm of muscle       loratadine 10 MG tablet    CLARITIN     Take 10 mg by mouth        multivitamin, therapeutic with minerals Tabs tablet     30 each    Take 1 tablet by mouth daily    MS (multiple sclerosis) (H)       nystatin-triamcinolone cream    MYCOLOG II     Apply topically 2 times daily as needed        OMEGA-3 PO      Take 300-1,000 mg by mouth daily        OMEPRAZOLE PO      Take 20 mg by mouth every morning        PARoxetine 25 MG 24 hr tablet    PAXIL-CR    30 tablet    Take 1 tablet (25 mg) by mouth every morning    Episode of recurrent major depressive disorder, unspecified depression episode severity (H)       polyethylene glycol Packet    MIRALAX/GLYCOLAX     Take 17 g by mouth        traMADol 50 MG tablet    ULTRAM    28 tablet    Take 1 tablet (50 mg) by mouth  every 6 hours as needed    Spasm of muscle       TYLENOL 325 MG tablet   Generic drug:  acetaminophen      Take 650 mg by mouth every 4 hours as needed for mild pain

## 2018-06-19 NOTE — TELEPHONE ENCOUNTER
Health Call Center    Phone Message    May a detailed message be left on voicemail: yes    Reason for Call: Medication Question or concern regarding medication   Prescription Clarification  Name of Medication: Baclofen Pump  Prescribing Provider: Dr. Bassett    Pharmacy: [facility], fax: [phone]   What on the order needs clarification? They need to clarify the concentration and if the reservoir is 20mL or 40mL. Please call them back or fax over the order. Pt needs refill by tomorrow.     Action Taken: Message routed to:  [facility] ([facility] PM&R

## 2018-07-25 NOTE — MR AVS SNAPSHOT
After Visit Summary   7/25/2018    Aga Fraga    MRN: [id number]           Patient Information     Date Of Birth          1950        Visit Information        Provider Department      7/25/2018 1:30 PM Nurse, Malcolm Ruby [facility] Physical Medicine and Rehabilitation        Today's Diagnoses     MS (multiple sclerosis) (H)    -  1    Spasm of muscle        Presence of implanted infusion pump           Follow-ups after your visit        Follow-up notes from your care team     Return in about 7 weeks (around 9/10/2018) for Baclofen Pump refill.      Your next 10 appointments already scheduled     Sep 10, 2018  1:00 PM CDT   (Arrive by 12:45 PM)   Return Visit with  Pmr Nurse   [facility] Physical Medicine and Rehabilitation ([facility])    [address] [place] [address]  3rd [facility] [address] [phone]            Oct 29, 2018 10:00 AM CDT   (Arrive by 9:45 AM)   Return Visit with Lynn Lewis PA-C   [facility] Urology and [facility] for Prostate and Urologic Cancers ([facility])    [address] [place] [address] [phone]              Who to contact     Please call your clinic at [phone] to:    Ask questions about your health    Make or cancel appointments    Discuss your medicines    Learn about your test results    Speak to your doctor            Additional Information About Your Visit        MyChart Information     [employer] is an electronic gateway that provides easy, online access to your medical records. With [employer], you can request a clinic appointment, read your test results, renew a prescription or communicate with your care team.     To sign up for [employer] visit the website at [email or web address]   You will be asked to enter the access code listed below, as well as some personal information. Please follow the directions to create your username and password.     Your access code is:  [email or web address]  Expires: 10/11/2018 11:35 AM     Your access code will  in 90 days. If you need help or a new code, please contact your [facility] or call [phone] for assistance.        Care EveryWhere ID     This is your Care EveryWhere ID. This could be used by other organizations to access your [place] medical records  [id number]         Blood Pressure from Last 3 Encounters:   18 134/65   18 123/66   17 108/54    Weight from Last 3 Encounters:   18 63.5 kg (140 lb)   18 63.5 kg (140 lb)   17 62.6 kg (138 lb)              Today, you had the following     No orders found for display         Today's Medication Changes          These changes are accurate as of 18 11:59 PM.  If you have any questions, ask your nurse or doctor.               These medicines have changed or have updated prescriptions.        Dose/Directions    baclofen 20 MG tablet   Commonly known as:  LIORESAL   This may have changed:    - when to take this  - additional instructions   Used for:  Multiple sclerosis (H), Muscle spasm        Dose:  10 mg   Take 0.5 tablets (10 mg) by mouth 4 times daily as needed for muscle spasms   Quantity:  120 tablet   Refills:  3       gabapentin 100 MG capsule   Commonly known as:  NEURONTIN   This may have changed:    - when to take this  - additional instructions   Used for:  Spasm of muscle        Dose:  100-200 mg   Take 1-2 capsules (100-200 mg) by mouth 3 times daily Take 100 mg (1 capsule) by mouth in the morning, 100 mg at noon, 100 mg in the afternoon, and 200 mg (2 capsules) at bedtimeSee Admin Instructions Take 100 mg (1 capsule) by mouth in the morning, 100 mg at noon, 100 mg in the afternoon, and 200 mg (2 capsules) at bedtime   Quantity:  90 capsule   Refills:  0       PARoxetine 25 MG 24 hr tablet   Commonly known as:  PAXIL-CR   This may have changed:  how much to take   Used for:  Episode of recurrent major  depressive disorder, unspecified depression episode severity (H)        Dose:  25 mg   Take 1 tablet (25 mg) by mouth every morning   Quantity:  30 tablet   Refills:  0       traMADol 50 MG tablet   Commonly known as:  ULTRAM   This may have changed:  when to take this   Used for:  Spasm of muscle        Dose:  50 mg   Take 1 tablet (50 mg) by mouth every 6 hours as needed   Quantity:  28 tablet   Refills:  0                Primary Care Provider Office Phone # Fax #    Astrid Marroquin [phone]       [facility] AFTER HOURS [address] [place] [address]        Equal Access to Services     [facility]: Hadii rad geiger hadasho Soyuli, waaxda luqadaha, qaybta kaalmada annelise, carmen leos . So [facility] [phone].    ATENCIÓN: Si habla español, tiene a urena disposición servicios gratuitos de asistencia lingüística. [facility] [phone].    We comply with applicable federal civil rights laws and [place] laws. We do not discriminate on the basis of race, color, national origin, age, disability, sex, sexual orientation, or gender identity.            Thank you!     Thank you for choosing [facility] PHYSICAL MEDICINE AND REHABILITATION  for your care. Our goal is always to provide you with excellent care. Hearing back from our patients is one way we can continue to improve our services. Please take a few minutes to complete the written survey that you may receive in the mail after your visit with us. Thank you!             Your Updated Medication List - Protect others around you: Learn how to safely use, store and throw away your medicines at www.disposemymeds.org.          This list is accurate as of 7/25/18 11:59 PM.  Always use your most recent med list.                   Brand Name Dispense Instructions for use Diagnosis    ADVAIR DISKUS IN      Inhale 1 puff into the lungs 2 times daily        albuterol 1.25 MG/3ML nebulizer solution    ACCUNEB     Take 1 vial  by nebulization every 4 hours as needed for shortness of breath / dyspnea or wheezing        atenolol 25 MG tablet    TENORMIN    30 tablet    Take 1 tablet (25 mg) by mouth 2 times daily    HOCM (hypertrophic obstructive cardiomyopathy) (H)       baclofen 20 MG tablet    LIORESAL    120 tablet    Take 0.5 tablets (10 mg) by mouth 4 times daily as needed for muscle spasms    Multiple sclerosis (H), Muscle spasm       BENZONATATE PO      Take 100 mg by mouth        cholecalciferol 1000 UNIT tablet    vitamin D3     Take 1,000 Units by mouth daily        collagenase ointment    SANTYL     Apply topically as needed (Wound care for pressure ulcers)        COPAXONE 40 MG/ML Sosy   Generic drug:  Glatiramer Acetate      Inject 1 mL Subcutaneous three times a week Every Tuesday, Thursday and Saturday        ferrous sulfate 325 (65 Fe) MG tablet    IRON     Take 325 mg by mouth daily (with breakfast)        fluticasone 50 MCG/ACT spray    FLONASE     Spray 1 spray into both nostrils daily as needed for rhinitis or allergies        gabapentin 100 MG capsule    NEURONTIN    90 capsule    Take 1-2 capsules (100-200 mg) by mouth 3 times daily Take 100 mg (1 capsule) by mouth in the morning, 100 mg at noon, 100 mg in the afternoon, and 200 mg (2 capsules) at bedtimeSee Admin Instructions Take 100 mg (1 capsule) by mouth in the morning, 100 mg at noon, 100 mg in the afternoon, and 200 mg (2 capsules) at bedtime    Spasm of muscle       loratadine 10 MG tablet    CLARITIN     Take 10 mg by mouth        multivitamin, therapeutic with minerals Tabs tablet     30 each    Take 1 tablet by mouth daily    MS (multiple sclerosis) (H)       nystatin-triamcinolone cream    MYCOLOG II     Apply topically 2 times daily as needed        OMEGA-3 PO      Take 300-1,000 mg by mouth daily        OMEPRAZOLE PO      Take 20 mg by mouth every morning        PARoxetine 25 MG 24 hr tablet    PAXIL-CR    30 tablet    Take 1 tablet (25 mg) by mouth  every morning    Episode of recurrent major depressive disorder, unspecified depression episode severity (H)       polyethylene glycol Packet    MIRALAX/GLYCOLAX     Take 17 g by mouth        traMADol 50 MG tablet    ULTRAM    28 tablet    Take 1 tablet (50 mg) by mouth every 6 hours as needed    Spasm of muscle       TYLENOL 325 MG tablet   Generic drug:  acetaminophen      Take 650 mg by mouth every 4 hours as needed for mild pain

## 2018-07-27 NOTE — PROGRESS NOTES
Intrathecal Pump Clinic Note    Aga Fraga is being seen in the Intrathecal Baclofen Pump Clinic for a pump refill for Spasticity secondary to MS.    VERIFICATION OF PATIENT IDENTIFICATION AND PROCEDURE     Initials   Patient Name lmd   Patient  lmd   Procedure Verified by: lmd     Prior to the start of the procedure and with procedural staff participation, I verbally confirmed the patient s identity using two indicators, relevant allergies, that the procedure was appropriate and matched the consent or emergent situation, and that the correct equipment/implants were available. Immediately prior to starting the procedure I conducted the Time Out with the procedural staff and re-confirmed the patient s name, procedure, and site/side. (The Joint Commission universal protocol was followed.)  Yes    INTERIM HISTORY:  Aga has not been doing well since our last visit.  She complains of hospitalized at [facility] with respiratory failure and pneumonia.  Her last pump refill was done there on . She then was transferred to [facility]    She once again was hospitalized last week at [facility] in [place] for acute metabolic encephalopathy secondary to sepsis.  She now is back at [facility] where she now lives.     INTERIM PAST MEDICAL HISTORY:  There have been other physician visits since our last appointment.  See above.    She IS NOT currently involved in therapy.  She is also working on ADL's for a home program.  She has made functional gains in .    REVIEW OF SYSTEMS:  She has pain managed with Baclofen Pump and oral meds.  She is having bladder issues with frequent UTI's hospitalized with sepsis  She denies having issues with her bowels.  Patient denies side effects from the intrathecal Baclofen.    Current Outpatient Prescriptions   Medication Sig Dispense Refill     acetaminophen (TYLENOL) 325 MG tablet Take 650 mg by mouth every 4 hours as needed for  mild pain       albuterol (ACCUNEB) 1.25 MG/3ML nebulizer solution Take 1 vial by nebulization every 4 hours as needed for shortness of breath / dyspnea or wheezing       atenolol (TENORMIN) 25 MG tablet Take 1 tablet (25 mg) by mouth 2 times daily 30 tablet      baclofen (LIORESAL) 20 MG tablet Take 0.5 tablets (10 mg) by mouth 4 times daily as needed for muscle spasms (Patient taking differently: Take 10 mg by mouth 2 times daily Taken Every AM and PM. Also given up to three times throughout the day PRN) 120 tablet 3     BENZONATATE PO Take 100 mg by mouth       cholecalciferol (VITAMIN D3) 1000 UNIT tablet Take 1,000 Units by mouth daily       collagenase ointment Apply topically as needed (Wound care for pressure ulcers)       ferrous sulfate (IRON) 325 (65 FE) MG tablet Take 325 mg by mouth daily (with breakfast)       fluticasone (FLONASE) 50 MCG/ACT spray Spray 1 spray into both nostrils daily as needed for rhinitis or allergies       Fluticasone-Salmeterol (ADVAIR DISKUS IN) Inhale 1 puff into the lungs 2 times daily       gabapentin (NEURONTIN) 100 MG capsule Take 1-2 capsules (100-200 mg) by mouth 3 times daily Take 100 mg (1 capsule) by mouth in the morning, 100 mg at noon, 100 mg in the afternoon, and 200 mg (2 capsules) at bedtimeSee Admin Instructions Take 100 mg (1 capsule) by mouth in the morning, 100 mg at noon, 100 mg in the afternoon, and 200 mg (2 capsules) at bedtime (Patient taking differently: Take 100-200 mg by mouth Take 100 mg (1 capsule) by mouth in the morning, 100 mg at noon, 100 mg in the afternoon, and 200 mg (2 capsules) at bedtimeSee Admin Instructions Take 100 mg (1 capsule) by mouth in the morning, 100 mg at noon, 100 mg in the afternoon, and 200 mg (2 capsules) at bedtime) 90 capsule 0     Glatiramer Acetate (COPAXONE) 40 MG/ML SOSY Inject 1 mL Subcutaneous three times a week Every Tuesday, Thursday and Saturday       loratadine (CLARITIN) 10 MG tablet Take 10 mg by mouth        multivitamin, therapeutic with minerals (THERA-VIT-M) TABS Take 1 tablet by mouth daily 30 each 0     nystatin-triamcinolone (MYCOLOG II) cream Apply topically 2 times daily as needed       Omega-3 Fatty Acids (OMEGA-3 PO) Take 300-1,000 mg by mouth daily       OMEPRAZOLE PO Take 20 mg by mouth every morning        PARoxetine (PAXIL-CR) 25 MG 24 hr tablet Take 1 tablet (25 mg) by mouth every morning (Patient taking differently: Take 10 mg by mouth every morning ) 30 tablet      polyethylene glycol (MIRALAX/GLYCOLAX) Packet Take 17 g by mouth       traMADol (ULTRAM) 50 MG tablet Take 1 tablet (50 mg) by mouth every 6 hours as needed (Patient taking differently: Take 50 mg by mouth 3 times daily ) 28 tablet        INTERIM SOCIAL HISTORY:  Social situation has changed since the last visit.  She is unemployed.  She is living: has changed living facility to [facility].    PHYSICAL EXAMINATION:  Interrogation of the Baclofen  pump shows that it is currently running at a Flex (complex continuous) dose basal rate of 27.0mcg/hr and 35.0 mcg/hour over 5 hours; 35.4 mcg/hour over 5 hours for total dose of 730.4 mcg/day.  Pump site is intact with no signs of infection, redness, swelling or seroma.      Clonus is not present.      Musculoskeletal contractures are present in the knees and ankles. She has bilateral knee flexion contractures with bilateral plantar flexion contractures, and her right ankle has an inversion flexion contracture.      Gait examination showed non-ambulatory.      Upper extremity motor control:  Unchanged from previous exam.      Lower extremity motor control/gait:  Unchanged from previous exam.      Oral motor control/speech:  Is optimized      Pain:  Is under adequate control.      Psychosocial:  Status is unchanged from the previous visit    IMPRESSION/PLAN:  Visit Reason: Pump Refill  NDC #: 2000 (61970-565-55)  Sterile Prep & Drape: Yes  Mililiters Withdrawn: 7.2  Mililiters  Expected: 7.2  Previous Dose: Flex dosing (basal 27.0 mcg/hr), with flex dosing to equal 730.4 mcg/day  New Dose: Flex dosing  (basal 27.0 mcg/hr), with flex dosing to equal 730.4 mcg/day  Alarm Date: 9/12/2018  Spasticity: Optimal  Therapeutic Level: Optimal  Symptoms: none noted   Estimated LES: 32months    Next pump refill appointment 9/10/2018      Niru Yun RN  Under the supervision of  Gigi Bassett MD  Department of Physical Medicine and Rehabilitation

## 2018-09-10 NOTE — MR AVS SNAPSHOT
After Visit Summary   9/10/2018    Aga Fraga    MRN: [id number]           Patient Information     Date Of Birth          1950        Visit Information        Provider Department      9/10/2018 1:00 PM Nurse, Malcolm RUTH [facility] Physical Medicine and Rehabilitation        Today's Diagnoses     MS (multiple sclerosis) (H)    -  1    Spasm of muscle        Presence of implanted infusion pump           Follow-ups after your visit        Follow-up notes from your care team     Return in about 3 months (around 12/24/2018) for Baclofen Pump refill.      Your next 10 appointments already scheduled     Oct 24, 2018 12:30 PM CDT   (Arrive by 12:15 PM)   Return Visit with Malcolm Pmr Nurse   [facility] Physical Medicine and Rehabilitation ([facility])    [address] [place] [address]  [address] [facility] [address] [phone]            Oct 29, 2018 10:00 AM CDT   (Arrive by 9:45 AM)   Return Visit with Lynn Lewis PA-C   [facility]rology and Inst for Prostate and Urologic Cancers ([facility])    [address] [place] [address]  [address] [facility] [id number] [phone]              Who to contact     Please call your clinic at [phone] to:    Ask questions about your health    Make or cancel appointments    Discuss your medicines    Learn about your test results    Speak to your doctor            Additional Information About Your Visit        Care EveryWhere ID     This is your Care EveryWhere ID. This could be used by other organizations to access your [facility] medical records  [id number]         Blood Pressure from Last 3 Encounters:   04/27/18 134/65   03/26/18 123/66   09/25/17 108/54    Weight from Last 3 Encounters:   04/27/18 63.5 kg (140 lb)   03/26/18 63.5 kg (140 lb)   09/25/17 62.6 kg (138 lb)              Today, you had the following     No orders found for display         Today's Medication Changes          These changes are accurate  as of 9/10/18 11:59 PM.  If you have any questions, ask your nurse or doctor.               These medicines have changed or have updated prescriptions.        Dose/Directions    baclofen 20 MG tablet   Commonly known as:  LIORESAL   This may have changed:    - when to take this  - additional instructions   Used for:  Multiple sclerosis (H), Muscle spasm        Dose:  10 mg   Take 0.5 tablets (10 mg) by mouth 4 times daily as needed for muscle spasms   Quantity:  120 tablet   Refills:  3       gabapentin 100 MG capsule   Commonly known as:  NEURONTIN   This may have changed:    - when to take this  - additional instructions   Used for:  Spasm of muscle        Dose:  100-200 mg   Take 1-2 capsules (100-200 mg) by mouth 3 times daily Take 100 mg (1 capsule) by mouth in the morning, 100 mg at noon, 100 mg in the afternoon, and 200 mg (2 capsules) at bedtimeSee Admin Instructions Take 100 mg (1 capsule) by mouth in the morning, 100 mg at noon, 100 mg in the afternoon, and 200 mg (2 capsules) at bedtime   Quantity:  90 capsule   Refills:  0       PARoxetine 25 MG 24 hr tablet   Commonly known as:  PAXIL-CR   This may have changed:  how much to take   Used for:  Episode of recurrent major depressive disorder, unspecified depression episode severity (H)        Dose:  25 mg   Take 1 tablet (25 mg) by mouth every morning   Quantity:  30 tablet   Refills:  0       traMADol 50 MG tablet   Commonly known as:  ULTRAM   This may have changed:  when to take this   Used for:  Spasm of muscle        Dose:  50 mg   Take 1 tablet (50 mg) by mouth every 6 hours as needed   Quantity:  28 tablet   Refills:  0                Primary Care Provider Office Phone # Fax #    Astridmikhail Marroquin [phone]       [facility] AFTER HOURS [address] [place] [address]        Equal Access to Services     [facility] MALKA AH: Yvon Cardona, diane wright, carmen baldwin  lakitty frankel. So [facility] [phone].    ATENCIÓN: Si habla chaz, tiene a urena disposición servicios gratuitos de asistencia lingüística. William cota [phone].    We comply with applicable federal civil rights laws and [place] laws. We do not discriminate on the basis of race, color, national origin, age, disability, sex, sexual orientation, or gender identity.            Thank you!     Thank you for choosing [facility] PHYSICAL MEDICINE AND REHABILITATION  for your care. Our goal is always to provide you with excellent care. Hearing back from our patients is one way we can continue to improve our services. Please take a few minutes to complete the written survey that you may receive in the mail after your visit with us. Thank you!             Your Updated Medication List - Protect others around you: Learn how to safely use, store and throw away your medicines at www.disposemymeds.org.          This list is accurate as of 9/10/18 11:59 PM.  Always use your most recent med list.                   Brand Name Dispense Instructions for use Diagnosis    ADVAIR DISKUS IN      Inhale 1 puff into the lungs 2 times daily        albuterol 1.25 MG/3ML nebulizer solution    ACCUNEB     Take 1 vial by nebulization every 4 hours as needed for shortness of breath / dyspnea or wheezing        atenolol 25 MG tablet    TENORMIN    30 tablet    Take 1 tablet (25 mg) by mouth 2 times daily    HOCM (hypertrophic obstructive cardiomyopathy) (H)       baclofen 20 MG tablet    LIORESAL    120 tablet    Take 0.5 tablets (10 mg) by mouth 4 times daily as needed for muscle spasms    Multiple sclerosis (H), Muscle spasm       BENZONATATE PO      Take 100 mg by mouth        cholecalciferol 1000 UNIT tablet    vitamin D3     Take 1,000 Units by mouth daily        collagenase ointment    SANTYL     Apply topically as needed (Wound care for pressure ulcers)        COPAXONE 40 MG/ML Sosy   Generic drug:  Glatiramer Acetate      Inject 1 mL Subcutaneous  three times a week Every Tuesday, Thursday and Saturday        ferrous sulfate 325 (65 Fe) MG tablet    IRON     Take 325 mg by mouth daily (with breakfast)        fluticasone 50 MCG/ACT spray    FLONASE     Spray 1 spray into both nostrils daily as needed for rhinitis or allergies        gabapentin 100 MG capsule    NEURONTIN    90 capsule    Take 1-2 capsules (100-200 mg) by mouth 3 times daily Take 100 mg (1 capsule) by mouth in the morning, 100 mg at noon, 100 mg in the afternoon, and 200 mg (2 capsules) at bedtimeSee Admin Instructions Take 100 mg (1 capsule) by mouth in the morning, 100 mg at noon, 100 mg in the afternoon, and 200 mg (2 capsules) at bedtime    Spasm of muscle       loratadine 10 MG tablet    CLARITIN     Take 10 mg by mouth        multivitamin, therapeutic with minerals Tabs tablet     30 each    Take 1 tablet by mouth daily    MS (multiple sclerosis) (H)       nystatin-triamcinolone cream    MYCOLOG II     Apply topically 2 times daily as needed        OMEGA-3 PO      Take 300-1,000 mg by mouth daily        OMEPRAZOLE PO      Take 20 mg by mouth every morning        PARoxetine 25 MG 24 hr tablet    PAXIL-CR    30 tablet    Take 1 tablet (25 mg) by mouth every morning    Episode of recurrent major depressive disorder, unspecified depression episode severity (H)       polyethylene glycol Packet    MIRALAX/GLYCOLAX     Take 17 g by mouth        traMADol 50 MG tablet    ULTRAM    28 tablet    Take 1 tablet (50 mg) by mouth every 6 hours as needed    Spasm of muscle       TYLENOL 325 MG tablet   Generic drug:  acetaminophen      Take 650 mg by mouth every 4 hours as needed for mild pain

## 2018-09-14 NOTE — PROGRESS NOTES
Intrathecal Pump Clinic Note    Aga Fraga is being seen in the Intrathecal Baclofen Pump Clinic for a pump refill for Spasticity secondary to MS.      VERIFICATION OF PATIENT IDENTIFICATION AND PROCEDURE     Initials   Patient Name lmd   Patient  lmd   Procedure Verified by: lmd     Prior to the start of the procedure and with procedural staff participation, I verbally confirmed the patient s identity using two indicators, relevant allergies, that the procedure was appropriate and matched the consent or emergent situation, and that the correct equipment/implants were available. Immediately prior to starting the procedure I conducted the Time Out with the procedural staff and re-confirmed the patient s name, procedure, and site/side. (The Joint Commission universal protocol was followed.)  Yes      INTERIM HISTORY:  Aga has been doing well since our last visit.    INTERIM PAST MEDICAL HISTORY:  There have been other physician visits since our last appointment.  She has been seen by [facility]'s physicians for buttocks wound care and MS management.  She IS NOT currently involved in therapy.  She is also working on ADL's for a home program.  She has made functional gains in .    REVIEW OF SYSTEMS:  She has pain managed with Baclofen Pump and oral meds.  She is having bladder issues with hx frequent UTI's  She denies having issues with her bowels.  Patient denies side effects from the intrathecal Baclofen.       Current Outpatient Prescriptions   Medication Sig Dispense Refill     acetaminophen (TYLENOL) 325 MG tablet Take 650 mg by mouth every 4 hours as needed for mild pain       albuterol (ACCUNEB) 1.25 MG/3ML nebulizer solution Take 1 vial by nebulization every 4 hours as needed for shortness of breath / dyspnea or wheezing       atenolol (TENORMIN) 25 MG tablet Take 1 tablet (25 mg) by mouth 2 times daily 30 tablet      baclofen (LIORESAL) 20 MG tablet Take 0.5 tablets (10 mg) by mouth 4 times daily  as needed for muscle spasms (Patient taking differently: Take 10 mg by mouth 2 times daily Taken Every AM and PM. Also given up to three times throughout the day PRN) 120 tablet 3     BENZONATATE PO Take 100 mg by mouth       cholecalciferol (VITAMIN D3) 1000 UNIT tablet Take 1,000 Units by mouth daily       collagenase ointment Apply topically as needed (Wound care for pressure ulcers)       ferrous sulfate (IRON) 325 (65 FE) MG tablet Take 325 mg by mouth daily (with breakfast)       fluticasone (FLONASE) 50 MCG/ACT spray Spray 1 spray into both nostrils daily as needed for rhinitis or allergies       Fluticasone-Salmeterol (ADVAIR DISKUS IN) Inhale 1 puff into the lungs 2 times daily       gabapentin (NEURONTIN) 100 MG capsule Take 1-2 capsules (100-200 mg) by mouth 3 times daily Take 100 mg (1 capsule) by mouth in the morning, 100 mg at noon, 100 mg in the afternoon, and 200 mg (2 capsules) at bedtimeSee Admin Instructions Take 100 mg (1 capsule) by mouth in the morning, 100 mg at noon, 100 mg in the afternoon, and 200 mg (2 capsules) at bedtime (Patient taking differently: Take 100-200 mg by mouth Take 100 mg (1 capsule) by mouth in the morning, 100 mg at noon, 100 mg in the afternoon, and 200 mg (2 capsules) at bedtimeSee Admin Instructions Take 100 mg (1 capsule) by mouth in the morning, 100 mg at noon, 100 mg in the afternoon, and 200 mg (2 capsules) at bedtime) 90 capsule 0     Glatiramer Acetate (COPAXONE) 40 MG/ML SOSY Inject 1 mL Subcutaneous three times a week Every Tuesday, Thursday and Saturday       loratadine (CLARITIN) 10 MG tablet Take 10 mg by mouth       multivitamin, therapeutic with minerals (THERA-VIT-M) TABS Take 1 tablet by mouth daily 30 each 0     nystatin-triamcinolone (MYCOLOG II) cream Apply topically 2 times daily as needed       Omega-3 Fatty Acids (OMEGA-3 PO) Take 300-1,000 mg by mouth daily       OMEPRAZOLE PO Take 20 mg by mouth every morning        PARoxetine (PAXIL-CR) 25 MG 24  hr tablet Take 1 tablet (25 mg) by mouth every morning (Patient taking differently: Take 10 mg by mouth every morning ) 30 tablet      polyethylene glycol (MIRALAX/GLYCOLAX) Packet Take 17 g by mouth       traMADol (ULTRAM) 50 MG tablet Take 1 tablet (50 mg) by mouth every 6 hours as needed (Patient taking differently: Take 50 mg by mouth 3 times daily ) 28 tablet        INTERIM SOCIAL HISTORY:  Social situation has not changed since the last visit.  She is unemployed.  She is living at [facility].  Pt appears much healthier today.  Skin color improved and affect brighter.  She is more interactive and comfortable.      PHYSICAL EXAMINATION:  Interrogation of the Baclofen  pump shows that it is currently running at a Flex (complex continuous) dose basal rate of 27.0mcg/hr and 35.0 mcg/hour over 5 hours; 35.4 mcg/hour over 5 hours for total dose of 730.4 mcg/day.  Pump site is intact with no signs of infection, redness, swelling or seroma.      Clonus is not present.      Musculoskeletal contractures are present in the knees and ankles. She has bilateral knee flexion contractures with bilateral plantar flexion contractures, and her right ankle has an inversion flexion contracture.  Has soft right ankle splint which appears to be helping align her lower extremity.      Gait examination showed non-ambulatory.      Upper extremity motor control:  Unchanged from previous exam.      Lower extremity motor control/gait:  Unchanged from previous exam.      Oral motor control/speech:  Is optimized      Pain:  Is under adequate control.      Psychosocial:  Status is unchanged from the previous visit    IMPRESSION/PLAN:  Visit Reason: Pump Refill  NDC #: 2000 ([id number])  Sterile Prep & Drape: Yes  Mililiters Withdrawn: 7.2  Mililiters Expected: 7.2  Previous Dose: Flex dosing (basal 27.0 mcg/hr), with flex dosing to equal 730.4 mcg/day  New Dose: Flex dosing  (basal 27.0 mcg/hr), with flex dosing to equal  730.4 mcg/day  Alarm Date: 10/29/2018  Spasticity: Optimal  Therapeutic Level: Optimal  Symptoms: none noted   Estimated LES: 28months    Next pump refill appointment 10/24/2018    Niru Yun RN  Under the supervision of  Gigi Bassett MD  Department of Physical Medicine and Rehabilitation

## 2018-10-23 NOTE — MR AVS SNAPSHOT
After Visit Summary   10/23/2018    Aga Fraga    MRN: [id number]           Patient Information     Date Of Birth          1950        Visit Information        Provider Department      10/23/2018 12:30 PM Nurse, Malcolm Ruby [facility] Physical Medicine and Rehabilitation        Today's Diagnoses     MS (multiple sclerosis) (H)    -  1    Spasm of muscle        Presence of implanted infusion pump           Follow-ups after your visit        Follow-up notes from your care team     Return in about 6 weeks (around 12/6/2018) for Baclofen Pump refill.      Your next 10 appointments already scheduled     Oct 29, 2018 10:00 AM CDT   (Arrive by 9:45 AM)   Return Visit with Lynn Lewis PA-C   [facility]rology and [facility] for Prostate and Urologic Cancers ([facility])    [address] [facility] [address]  4th Floor  [facility] [address] [phone]            Dec 06, 2018 12:30 PM CST   (Arrive by 12:15 PM)   Return Visit with Malcolm Pmr Nurse   [facility] Physical Medicine and Rehabilitation ([facility])    [address]  3rd Floor  [facility] [address] [phone]              Who to contact     Please call your clinic at [phone] to:    Ask questions about your health    Make or cancel appointments    Discuss your medicines    Learn about your test results    Speak to your doctor            Additional Information About Your Visit        Care EveryWhere ID     This is your Care EveryWhere ID. This could be used by other organizations to access your [place] medical records  [id number]         Blood Pressure from Last 3 Encounters:   04/27/18 134/65   03/26/18 123/66   09/25/17 108/54    Weight from Last 3 Encounters:   04/27/18 63.5 kg (140 lb)   03/26/18 63.5 kg (140 lb)   09/25/17 62.6 kg (138 lb)              Today, you had the following     No orders found for display         Today's Medication Changes          These changes are accurate  as of 10/23/18 11:59 PM.  If you have any questions, ask your nurse or doctor.               These medicines have changed or have updated prescriptions.        Dose/Directions    baclofen 20 MG tablet   Commonly known as:  LIORESAL   This may have changed:    - when to take this  - additional instructions   Used for:  Multiple sclerosis (H), Muscle spasm        Dose:  10 mg   Take 0.5 tablets (10 mg) by mouth 4 times daily as needed for muscle spasms   Quantity:  120 tablet   Refills:  3       gabapentin 100 MG capsule   Commonly known as:  NEURONTIN   This may have changed:    - when to take this  - additional instructions   Used for:  Spasm of muscle        Dose:  100-200 mg   Take 1-2 capsules (100-200 mg) by mouth 3 times daily Take 100 mg (1 capsule) by mouth in the morning, 100 mg at noon, 100 mg in the afternoon, and 200 mg (2 capsules) at bedtimeSee Admin Instructions Take 100 mg (1 capsule) by mouth in the morning, 100 mg at noon, 100 mg in the afternoon, and 200 mg (2 capsules) at bedtime   Quantity:  90 capsule   Refills:  0       PARoxetine 25 MG 24 hr tablet   Commonly known as:  PAXIL-CR   This may have changed:  how much to take   Used for:  Episode of recurrent major depressive disorder, unspecified depression episode severity (H)        Dose:  25 mg   Take 1 tablet (25 mg) by mouth every morning   Quantity:  30 tablet   Refills:  0       traMADol 50 MG tablet   Commonly known as:  ULTRAM   This may have changed:  when to take this   Used for:  Spasm of muscle        Dose:  50 mg   Take 1 tablet (50 mg) by mouth every 6 hours as needed   Quantity:  28 tablet   Refills:  0                Primary Care Provider Office Phone # Fax #    Astrid Marroquin [phone]       [facility] AFTER HOURS [address] [place] [address]        Equal Access to Services     [facility] MALKA AH: Yvon Cardona, diane wright, carmen baldwin  harinderlisamihir gomesLazaan ah. So [facility] [phone].    ATENCIÓN: Si leo ware, tiene a urena disposición servicios gratuitos de asistencia lingüística. William cota [phone].    We comply with applicable federal civil rights laws and [place] laws. We do not discriminate on the basis of race, color, national origin, age, disability, sex, sexual orientation, or gender identity.            Thank you!     Thank you for choosing [facility] PHYSICAL MEDICINE AND REHABILITATION  for your care. Our goal is always to provide you with excellent care. Hearing back from our patients is one way we can continue to improve our services. Please take a few minutes to complete the written survey that you may receive in the mail after your visit with us. Thank you!             Your Updated Medication List - Protect others around you: Learn how to safely use, store and throw away your medicines at www.disposemymeds.org.          This list is accurate as of 10/23/18 11:59 PM.  Always use your most recent med list.                   Brand Name Dispense Instructions for use Diagnosis    ADVAIR DISKUS IN      Inhale 1 puff into the lungs 2 times daily        albuterol 1.25 MG/3ML nebulizer solution    ACCUNEB     Take 1 vial by nebulization every 4 hours as needed for shortness of breath / dyspnea or wheezing        atenolol 25 MG tablet    TENORMIN    30 tablet    Take 1 tablet (25 mg) by mouth 2 times daily    HOCM (hypertrophic obstructive cardiomyopathy) (H)       baclofen 20 MG tablet    LIORESAL    120 tablet    Take 0.5 tablets (10 mg) by mouth 4 times daily as needed for muscle spasms    Multiple sclerosis (H), Muscle spasm       BENZONATATE PO      Take 100 mg by mouth        cholecalciferol 1000 UNIT tablet    vitamin D3     Take 1,000 Units by mouth daily        collagenase ointment    SANTYL     Apply topically as needed (Wound care for pressure ulcers)        COPAXONE 40 MG/ML Sosy   Generic drug:  Glatiramer Acetate      Inject 1 mL  Subcutaneous three times a week Every Tuesday, Thursday and Saturday        ferrous sulfate 325 (65 Fe) MG tablet    IRON     Take 325 mg by mouth daily (with breakfast)        fluticasone 50 MCG/ACT spray    FLONASE     Spray 1 spray into both nostrils daily as needed for rhinitis or allergies        gabapentin 100 MG capsule    NEURONTIN    90 capsule    Take 1-2 capsules (100-200 mg) by mouth 3 times daily Take 100 mg (1 capsule) by mouth in the morning, 100 mg at noon, 100 mg in the afternoon, and 200 mg (2 capsules) at bedtimeSee Admin Instructions Take 100 mg (1 capsule) by mouth in the morning, 100 mg at noon, 100 mg in the afternoon, and 200 mg (2 capsules) at bedtime    Spasm of muscle       loratadine 10 MG tablet    CLARITIN     Take 10 mg by mouth        multivitamin, therapeutic with minerals Tabs tablet     30 each    Take 1 tablet by mouth daily    MS (multiple sclerosis) (H)       nystatin-triamcinolone cream    MYCOLOG II     Apply topically 2 times daily as needed        OMEGA-3 PO      Take 300-1,000 mg by mouth daily        OMEPRAZOLE PO      Take 20 mg by mouth every morning        PARoxetine 25 MG 24 hr tablet    PAXIL-CR    30 tablet    Take 1 tablet (25 mg) by mouth every morning    Episode of recurrent major depressive disorder, unspecified depression episode severity (H)       polyethylene glycol Packet    MIRALAX/GLYCOLAX     Take 17 g by mouth        traMADol 50 MG tablet    ULTRAM    28 tablet    Take 1 tablet (50 mg) by mouth every 6 hours as needed    Spasm of muscle       TYLENOL 325 MG tablet   Generic drug:  acetaminophen      Take 650 mg by mouth every 4 hours as needed for mild pain

## 2018-10-25 NOTE — PROGRESS NOTES
Intrathecal Pump Clinic Note    Aga Fraga is being seen in the Intrathecal Baclofen Pump Clinic for a pump refill for Spasticity secondary to MS.      VERIFICATION OF PATIENT IDENTIFICATION AND PROCEDURE     Initials   Patient Name lmd   Patient  lmd   Procedure Verified by: bonnie     Prior to the start of the procedure and with procedural staff participation, I verbally confirmed the patient s identity using two indicators, relevant allergies, that the procedure was appropriate and matched the consent or emergent situation, and that the correct equipment/implants were available. Immediately prior to starting the procedure I conducted the Time Out with the procedural staff and re-confirmed the patient s name, procedure, and site/side. (The Joint Commission universal protocol was followed.)  Yes      INTERIM HISTORY:  Aga has not been doing well since our last visit.  She was hospitalized at [facility] with septic UTI and was seen in ER with closed fracture of right tibia from hitting at table with her foot when running her electric wheelchair.  Now wearing soft splint on right ankle.  ER physician determined this fracture was probably due to a fall a month ago but pain exacerbated when she hit the table. She is now back living at [facility].    INTERIM PAST MEDICAL HISTORY:  There have been other physician visits since our last appointment.  She has been seen by ER and hospitalized at [facility].  See above.  She IS NOT currently involved in therapy.  She is also working on ADL's for a home program.  She has made functional gains in .    REVIEW OF SYSTEMS:  She has pain managed with Baclofen Pump and oral meds.  She has difficulty with bladder issues. Chronic recurring UTI's.  Was hospitalized with sepic UTI since last visit here.  She denies having issues with her bowels.  Patient denies side effects from the intrathecal Baclofen.    Current Outpatient Prescriptions   Medication  Sig Dispense Refill     acetaminophen (TYLENOL) 325 MG tablet Take 650 mg by mouth every 4 hours as needed for mild pain       albuterol (ACCUNEB) 1.25 MG/3ML nebulizer solution Take 1 vial by nebulization every 4 hours as needed for shortness of breath / dyspnea or wheezing       atenolol (TENORMIN) 25 MG tablet Take 1 tablet (25 mg) by mouth 2 times daily 30 tablet      baclofen (LIORESAL) 20 MG tablet Take 0.5 tablets (10 mg) by mouth 4 times daily as needed for muscle spasms (Patient taking differently: Take 10 mg by mouth 2 times daily Taken Every AM and PM. Also given up to three times throughout the day PRN) 120 tablet 3     BENZONATATE PO Take 100 mg by mouth       cholecalciferol (VITAMIN D3) 1000 UNIT tablet Take 1,000 Units by mouth daily       collagenase ointment Apply topically as needed (Wound care for pressure ulcers)       ferrous sulfate (IRON) 325 (65 FE) MG tablet Take 325 mg by mouth daily (with breakfast)       fluticasone (FLONASE) 50 MCG/ACT spray Spray 1 spray into both nostrils daily as needed for rhinitis or allergies       Fluticasone-Salmeterol (ADVAIR DISKUS IN) Inhale 1 puff into the lungs 2 times daily       gabapentin (NEURONTIN) 100 MG capsule Take 1-2 capsules (100-200 mg) by mouth 3 times daily Take 100 mg (1 capsule) by mouth in the morning, 100 mg at noon, 100 mg in the afternoon, and 200 mg (2 capsules) at bedtimeSee Admin Instructions Take 100 mg (1 capsule) by mouth in the morning, 100 mg at noon, 100 mg in the afternoon, and 200 mg (2 capsules) at bedtime (Patient taking differently: Take 100-200 mg by mouth Take 100 mg (1 capsule) by mouth in the morning, 100 mg at noon, 100 mg in the afternoon, and 200 mg (2 capsules) at bedtimeSee Admin Instructions Take 100 mg (1 capsule) by mouth in the morning, 100 mg at noon, 100 mg in the afternoon, and 200 mg (2 capsules) at bedtime) 90 capsule 0     Glatiramer Acetate (COPAXONE) 40 MG/ML SOSY Inject 1 mL Subcutaneous three times a  week Every Tuesday, Thursday and Saturday       loratadine (CLARITIN) 10 MG tablet Take 10 mg by mouth       multivitamin, therapeutic with minerals (THERA-VIT-M) TABS Take 1 tablet by mouth daily 30 each 0     nystatin-triamcinolone (MYCOLOG II) cream Apply topically 2 times daily as needed       Omega-3 Fatty Acids (OMEGA-3 PO) Take 300-1,000 mg by mouth daily       OMEPRAZOLE PO Take 20 mg by mouth every morning        PARoxetine (PAXIL-CR) 25 MG 24 hr tablet Take 1 tablet (25 mg) by mouth every morning (Patient taking differently: Take 10 mg by mouth every morning ) 30 tablet      polyethylene glycol (MIRALAX/GLYCOLAX) Packet Take 17 g by mouth       traMADol (ULTRAM) 50 MG tablet Take 1 tablet (50 mg) by mouth every 6 hours as needed (Patient taking differently: Take 50 mg by mouth 3 times daily ) 28 tablet        INTERIM SOCIAL HISTORY:  Social situation has not changed since the last visit.  She is unemployed.  She is living at [facility].    PHYSICAL EXAMINATION:  Interrogation of the Baclofen  pump shows that it is currently running at a Flex (complex continuous) dose basal rate of 27.0mcg/hr and 35.0 mcg/hour over 5 hours; 35.4 mcg/hour over 5 hours for total dose of 730.4 mcg/day.  Pump site is intact with no signs of infection, redness, swelling or seroma.      Clonus is not present.      Musculoskeletal contractures are present in the knees and ankles. She has bilateral knee flexion contractures with bilateral plantar flexion contractures, and her right ankle has an inversion flexion contracture.  Has soft right ankle splint which appears to be helping align her lower extremity and add stability since fracture.    Gait examination showed non-ambulatory.      Upper extremity motor control:  Unchanged from previous exam.      Lower extremity motor control/gait:  Unchanged from previous exam.      Oral motor control/speech:  Is optimized      Pain:  Is under adequate  control.      Psychosocial:  Status is unchanged from the previous visit    IMPRESSION/PLAN:  Visit Reason: Pump Refill  NDC #: 2000 ([id number])  Sterile Prep & Drape: Yes  Mililiters Withdrawn: 4.2  Mililiters Expected: 4.3  Previous Dose: Flex dosing (basal 27.0 mcg/hr), with flex dosing to equal 730.4 mcg/day  New Dose: Flex dosing  (basal 27.0 mcg/hr), with flex dosing to equal 730.4 mcg/day  Alarm Date: 12/11/2018  Spasticity: Optimal  Therapeutic Level: Optimal  Symptoms: none noted   Estimated LES: 27months    Next pump refill appointment 12/6/2018    Niru Yun RN  Under the supervision of  Gigi Bassett MD  Department of Physical Medicine and Rehabilitation

## 2018-11-19 ENCOUNTER — AMBULATORY - HEALTHEAST (OUTPATIENT)
Dept: GERIATRICS | Facility: CLINIC | Age: 68
End: 2018-11-19

## 2021-05-30 VITALS — BODY MASS INDEX: 27.22 KG/M2 | HEIGHT: 65 IN | WEIGHT: 163.4 LBS

## 2021-05-30 VITALS — HEIGHT: 65 IN | WEIGHT: 139.4 LBS | BODY MASS INDEX: 23.22 KG/M2

## 2021-05-30 VITALS — HEIGHT: 65 IN | BODY MASS INDEX: 26.66 KG/M2 | WEIGHT: 160 LBS

## 2021-05-31 ENCOUNTER — RECORDS - HEALTHEAST (OUTPATIENT)
Dept: ADMINISTRATIVE | Facility: CLINIC | Age: 71
End: 2021-05-31

## 2021-05-31 VITALS — WEIGHT: 137.4 LBS | BODY MASS INDEX: 24.34 KG/M2 | HEIGHT: 63 IN

## 2021-05-31 VITALS — BODY MASS INDEX: 24.63 KG/M2 | HEIGHT: 63 IN | WEIGHT: 139 LBS

## 2021-05-31 VITALS — WEIGHT: 138 LBS | HEIGHT: 65 IN | BODY MASS INDEX: 22.99 KG/M2

## 2021-05-31 VITALS — WEIGHT: 136 LBS | HEIGHT: 63 IN | BODY MASS INDEX: 24.1 KG/M2

## 2021-06-01 VITALS — WEIGHT: 137.8 LBS | HEIGHT: 63 IN | BODY MASS INDEX: 24.41 KG/M2

## 2021-06-01 VITALS — WEIGHT: 138.8 LBS | BODY MASS INDEX: 24.59 KG/M2 | HEIGHT: 63 IN

## 2021-06-01 VITALS — BODY MASS INDEX: 26.75 KG/M2 | HEIGHT: 63 IN | WEIGHT: 151 LBS

## 2021-06-02 ENCOUNTER — RECORDS - HEALTHEAST (OUTPATIENT)
Dept: ADMINISTRATIVE | Facility: CLINIC | Age: 71
End: 2021-06-02

## 2021-06-02 VITALS — WEIGHT: 135.8 LBS | BODY MASS INDEX: 25.66 KG/M2

## 2021-06-02 VITALS — BODY MASS INDEX: 21.55 KG/M2 | WEIGHT: 129.5 LBS

## 2021-06-08 NOTE — PROGRESS NOTES
[facility]      Facility:    [facility] [[id number]]  Code Status: FULL CODE       Chief Complaint/Reason for Visit:  Chief Complaint   Patient presents with     H & P     Re-admit to LTC after PNT for renal stones. (H & P 1/23/17).       HPI:   Aga is a 66 y.o. female with multiple sclerosis and neurogenic bladder for over 20 years. For the latter, she does her own straight catheterization through a Mitrofanoff umbilical device. She does have recurrent urinary tract infections. Prior to her admission to long-term care, she had been living in a one level condo by herself with care attendants coming daily from 0800 to 1400 and from 2100 to 0300. They generally helped with straight catheterization, ADLs, and errands.     She fell from her powered wheelchair/scooter on 07/26/16 and sustained bilateral tibial closed tibial fractures and also a left maxillary sinus wall fracture and left periorbital laceration that did require sutures. She also sustained a trace subarachnoid hemorrhage (repeat head CT was stable). She apparently struck her left shoulder as well.      She also has T3-5 compression fractures of indeterminate age and hypertrophic cardiomyopathy with no evidence of acute heart failure. She has chronic respiratory failure (related to her MS) and is on 2L of O2 at night.    She had developed a right buttock decubitus ulcer (stage II) that she had prior to hospitalization, and dressing changes are done daily. Unfortunately, I noted another decubitus below the left buttock today that is stage III and fairly deep. Pain is managed well with scheduled TID tramadol and PRN dosing.    She has ongoing issues with muscle spasms. She has a baclofen pump and is receiving 10 mg of baclofen QID, an effective dose, although she told me she was experiencing spasms during my visit. We will recommend that she be seen at the wound clinic.    She has been taking paroxetine for  "depression and denies any current feelings of depression or anxiety.    When last seen she was preparing for revision of her Mitrofanoff and possible unilateral or bilateral nephrolithotomy. A CT scan on 11/16/16 showed significant bilateral staghorn calculi. She underwent a left percutaneous nephrolithotomy on 12/15/16 and was discharged the following day. A CT scan on postoperative day 1 showed a small residual stone fragment, and she was discharged with a follow-up scheduled to remove the remaining stone fragment which I believe occurred on 12/19/16. She has been receiving Augmentin prophylactically via PICC line, but that is going to be discontinued today. She will continue with oral Augmentin for 30 days. She is again scheduled for a right percutaneous nephrolithotomy on 01/12/17.    She does seem to have a persistent headache \"all the time,\" but tramadol has been effective in relieving the discomfort. She denies any chest pains, coughing or congestion, nausea or vomiting, dizziness or dyspnea, dysuria, difficulty chewing or swallowing, or problems with appetite or sleep. Bowels are now moving well. She does have PRN MiraLAX available.       Past Medical History:  Past Medical History   Diagnosis Date     Cardiac murmur      Chronic pain      GI bleed 11-     Hypertrophic cardiomyopathy x 15 yrs     Hypotension 11/18/2014     Multiple sclerosis      Neurogenic bladder      Pressure ulcer      right coccyx     SAH (subarachnoid hemorrhage) 7/27/2016     Tibial fracture left     Urinary calculi      UTI (lower urinary tract infection)      UTI (urinary tract infection) 7/28/2016           Surgical History:  Past Surgical History   Procedure Laterality Date     Baclofen pump implantation       and revision in 2014     Bladder mitrofanoff continent vesicostomy       Programable baclofen pump revision  4/2014     Cholecystectomy       Esophagogastroduodenoscopy N/A 11/18/2014     Procedure: " ESOPHAGOGASTRODUODENOSCOPY with bipolar cautery, epinephrine 1/10 dilution injection and resolution clip x2;  Surgeon: Jhoan Arguello MD;  Location: [facility];  Service:      Hysterectomy       Lithotripsy       Esophagogastroduodenoscopy N/A 1/4/2015     Procedure: ESOPHAGOGASTRODUODENOSCOPY with bicap, cautery, injection of epi/saline;  Surgeon: Ricky King MD;  Location: [facility];  Service:        Family History:   Family History   Problem Relation Age of Onset     Cancer Mother      Lung     Diabetes Mother      Hypertension Mother      No Medical Problems Father      in NH     No Medical Problems Daughter      No Medical Problems Daughter      Cardiomyopathy Sister        Social History:    Social History     Social History     Marital status:      Spouse name: N/A     Number of children: N/A     Years of education: N/A     Social History Main Topics     Smoking status: Former Smoker     Packs/day: 0.50     Years: 2.00     Quit date: 1/1/1974     Smokeless tobacco: Never Used     Alcohol use No     Drug use: No     Sexual activity: Yes     Birth control/ protection: Surgical     Other Topics Concern     Not on file     Social History Narrative        Medications:  Current Outpatient Prescriptions   Medication Sig Note     amoxicillin-clavulanate (AUGMENTIN) 875-125 mg per tablet Take 1 tablet by mouth 2 (two) times a day. Kidney Stones      atenolol (TENORMIN) 25 MG tablet Take 25 mg by mouth 2 (two) times a day.      baclofen (LIORESAL) 20 MG tablet Take 10 mg by mouth 4 (four) times a day as needed (muscle spasms). See scheduled dosing.      calcium-vitamin D (CALCIUM-VITAMIN D) 500 mg(1,250mg) -200 unit per tablet Take 1 tablet by mouth daily.      cholecalciferol, vitamin D3, 1,000 unit tablet Take 1,000 Units by mouth daily.       collagenase ointment Apply Santyl to wound daily; apply thick layer (oscar thickness)      ferrous sulfate 325 (65 FE) MG tablet Take 1 tablet by mouth  daily with breakfast.      fluticasone (FLONASE) 50 mcg/actuation nasal spray 1 spray into each nostril daily as needed.  1/4/2015: Uses infrequently     food supplemt, lactose-reduced (BOOST HIGH PROTEIN) Liqd Take 1 Can by mouth daily.      gabapentin (NEURONTIN) 100 MG capsule Take 100 mg by mouth 3 (three) times a day. Plus different dose at bedtime      gabapentin (NEURONTIN) 100 MG capsule Take 200 mg by mouth bedtime.      glatiramer (COPAXONE) 40 mg/mL Syrg injection Inject 40 mg under the skin 3 (three) times a week. Tu, Th, Sat      multivitamin with minerals (THERA-M) 9 mg iron-400 mcg Tab tablet Take 1 tablet by mouth daily.      nystatin-triamcinolone (MYCOLOG II) cream Apply topically 2 (two) times a day as needed.      OMEGA-3/DHA/EPA/FISH OIL (FISH OIL-OMEGA-3 FATTY ACIDS) 300-1,000 mg capsule Take 1 g by mouth daily.      omeprazole (PRILOSEC) 20 MG capsule Take 20 mg by mouth 2 (two) times a day.      oxyCODONE (ROXICODONE) 5 MG immediate release tablet Take 5 mg by mouth every 4 (four) hours as needed for pain.      PARoxetine (PAXIL-CR) 25 MG 24 hr tablet Take 25 mg by mouth daily.      polyethylene glycol (MIRALAX) 17 gram packet Take 17 g by mouth daily as needed.       traMADol (ULTRAM) 50 mg tablet Take 1 tablet (50 mg total) by mouth every 6 (six) hours as needed for pain. If patient gets drowsy or develops confusion or his mental status worsens  Or patient his having significant or worsening respiratory failure.  , then hold narcotics and call on call hospitalist for further directions        Allergies:  Allergies   Allergen Reactions     Blood-Group Specific Substance Unknown     Other reaction(s): Other (See Comments)  Patient has a history of a clinically significant antibody against RBC antigens.  A delay in compatible RBCs may occur.  Patient has Anti-E   Blood Product orders may be delayed.  Draw one red top and two purple top tubes for ALL Type and Screen/ Type and Crossmatch orders.       Aspartame Nausea And Vomiting     NUTRA SWEET POWDER     Buchu-Cornsilk-Ch Grass-Hydran Other (See Comments)     Should not use diuretics due to risk of hypotension with hypertrophic cardiomyopathy      Cephalexin Other (See Comments)     Nausea and vomiting  Nausea and vomiting     Fructose Nausea Only     Fructose 5 % Other (See Comments)     Furosemide Other (See Comments)     Hypertrophic cardiomyopathy     Hydrochlorothiazide Other (See Comments)     HCTZ, Lasix, Spironolactone  Cannot take any diuretics due to known hypertropic cardiomyopathy     Spironolactone Other (See Comments)     Hypertrophic cardiomyopathy  Cannot take any diuretics due to known hypertrophic cardiomyopathy and risk of hypotension from these.     Sulfamethoxazole-Trimethoprim Other (See Comments)     Rapidly resolved off the medication     Lanolin Rash       Review of Systems:  Pertinent items are noted in HPI.      Physical Exam:   General: Patient is alert,   Vitals: BP , Temp , Pulse , RR , O2 sat .  HEENT: Head is NCAT. Eyes show no injection or icterus. Nares negative. Oropharynx well hydrated.  Neck: Supple. No tenderness or adenopathy. No JVD.  Lungs: Clear bilaterally. No wheezes.  Cardiovascular: Regular rate and rhythm, normal S1. S2.  Back: No spinal or CVA tenderness.  Abdomen: Soft, no tenderness on exam. Bowel sounds present. No guarding rebound or rigidity.  : Deferred.  Extremities: No edema is noted.  Musculoskeletal:   Skin:  Psych: Mood appears good.      Labs:  Lab Results   Component Value Date    WBC 2.9 (L) 10/24/2016    HGB 10.0 (L) 10/24/2016    HCT 32.6 (L) 10/24/2016    MCV 87 10/24/2016     10/24/2016     Results for orders placed or performed in visit on 10/24/16   Basic Metabolic Panel   Result Value Ref Range    Sodium 142 136 - 145 mmol/L    Potassium 3.7 3.5 - 5.0 mmol/L    Chloride 104 98 - 107 mmol/L    CO2 29 22 - 31 mmol/L    Anion Gap, Calculation 9 5 - 18 mmol/L    Glucose 77 70 - 125  mg/dL    Calcium 10.1 8.5 - 10.5 mg/dL    BUN 9 8 - 22 mg/dL    Creatinine 0.52 (L) 0.60 - 1.10 mg/dL    GFR MDRD Af Amer >60 >60 mL/min/1.73m2    GFR MDRD Non Af Amer >60 >60 mL/min/1.73m2       Assessment/Plan:  1. Renal calculi. S/p PNT and subsequent removal. Follow up with urology.  2. Hypertrophic CM. Long standing. No acute issues.  3. MS. Receives Copaxone, baclofen.  4. Neurogenic bladder. Has Mitrofanoff.  5. HTN. Continue Atenolol.  6. Anemia. She is on iron.      Total time greater than 50 minutes, greater than 50% counseling and coordination of care, time spent in interview and examination of patient, review of records, discussion with nursing staff.      Electronically signed by: Astrid Marroquin MD

## 2021-06-08 NOTE — PROGRESS NOTES
[facility]    Name:   Aga Fraga  : 1950  Facility:   [facility] [[id number]]   Room: 328B  Code Status: FULL CODE -   Fac type:   NF (Long Term Care, LTC) -     CHIEF COMPLAINT / REASON FOR VISIT:  Chief Complaint   Patient presents with     Review Of Multiple Medical Conditions     Follow-up to readmission following hospitalization for right percutaneous nephrolithotomy.      Patient was last seen by me on 10/20/16, a visit to perform a pre-operative history and physical prior to revision of Mitrofanoff and possible bilateral renal stone removal. She was subsequently seen by Dr. Marroquin on 16.    HPI: Aga is a 66 y.o. female with multiple sclerosis and neurogenic bladder for over 20 years. For the latter, she does her own straight catheterization through a Mitrofanoff umbilical device. She does have recurrent urinary tract infections. Prior to her admission to long-term care, she had been living in a one level condo by herself with care attendants coming daily from 0800 to 1400 and from 2100 to 0300. They generally helped with straight catheterization, ADLs, and errands.     She fell from her powered wheelchair/scooter on 16 and sustained bilateral tibial closed tibial fractures and also a left maxillary sinus wall fracture and left periorbital laceration that did require sutures. She also sustained a trace subarachnoid hemorrhage (repeat head CT was stable). She apparently struck her left shoulder as well.      She also has T3-5 compression fractures of indeterminate age and hypertrophic cardiomyopathy with no evidence of acute heart failure. She has chronic respiratory failure (related to her MS) and is on 2L of O2 at night.    She had developed a right buttock decubitus ulcer (stage II) that she had prior to hospitalization, and dressing changes are done daily. Unfortunately, I noted another decubitus below the left buttock at my last visit  "that is stage III and fairly deep. She has been seen at the wound clinic, most recently on 01/31/17, and her wounds are doing well enough that she only needs to return to the wound clinic on a PRN basis. Pain is managed well with scheduled TID tramadol and PRN dosing. This is not only for her generalized pain but for headaches she has \"all the time.\"    She has ongoing issues with muscle spasms. She has a baclofen pump and is receiving 10 mg of baclofen QID, an effective dose. The pump was just filled yesterday, and I believe they are planning a replacement.    She has been taking paroxetine for depression and denies any current feelings of depression or anxiety.    She has undergone a revision of her Mitrofanoff and bilateral nephrolithotomies. A CT scan on 11/16/16 showed significant bilateral staghorn calculi. She underwent a left percutaneous nephrolithotomy on 12/15/16 and was discharged the following day. A CT scan on postoperative day 1 showed a small residual stone fragment, and she was discharged with a follow-up scheduled to remove the remaining stone fragment which I believe occurred on 12/19/16. She was subsequently scheduled for a right percutaneous nephrolithotomy on 01/12/17 with a readmission visit by Dr. Marroquin on 01/23/17.    She denies any chest pains, coughing or congestion, nausea or vomiting, dizziness or dyspnea, dysuria, difficulty chewing or swallowing, or problems with appetite or sleep. Bowels are now moving well. She does have PRN MiraLAX available.     Past Medical History:   Diagnosis Date     Cardiac murmur      Chronic pain      GI bleed 11-     Hypertrophic cardiomyopathy x 15 yrs     Hypotension 11/18/2014     Multiple sclerosis      Neurogenic bladder      Pressure ulcer     right coccyx     SAH (subarachnoid hemorrhage) 7/27/2016     Tibial fracture left     Urinary calculi      UTI (lower urinary tract infection)      UTI (urinary tract infection) 7/28/2016            "   Family History   Problem Relation Age of Onset     Cancer Mother      Lung     Diabetes Mother      Hypertension Mother      No Medical Problems Father      in NH     No Medical Problems Daughter      No Medical Problems Daughter      Cardiomyopathy Sister      Social History     Social History     Marital status:      Spouse name: N/A     Number of children: N/A     Years of education: N/A     Social History Main Topics     Smoking status: Former Smoker     Packs/day: 0.50     Years: 2.00     Quit date: 1/1/1974     Smokeless tobacco: Never Used     Alcohol use No     Drug use: No     Sexual activity: Yes     Birth control/ protection: Surgical     Other Topics Concern     Not on file     Social History Narrative     MEDICATIONS: Reviewed from the MAR, physician orders, and earlier progress notes.   Current Outpatient Prescriptions   Medication Sig     atenolol (TENORMIN) 25 MG tablet Take 25 mg by mouth 2 (two) times a day.     baclofen (LIORESAL) 20 MG tablet Take 10 mg by mouth 4 (four) times a day as needed (muscle spasms). See scheduled dosing.     calcium-vitamin D (CALCIUM-VITAMIN D) 500 mg(1,250mg) -200 unit per tablet Take 1 tablet by mouth daily.     cholecalciferol, vitamin D3, 1,000 unit tablet Take 1,000 Units by mouth daily.      collagenase ointment Apply Santyl to wound daily; apply thick layer (oscar thickness)     ferrous sulfate 325 (65 FE) MG tablet Take 1 tablet by mouth daily with breakfast.     fluticasone (FLONASE) 50 mcg/actuation nasal spray 1 spray into each nostril daily as needed.      food supplemt, lactose-reduced (BOOST HIGH PROTEIN) Liqd Take 1 Can by mouth daily.     gabapentin (NEURONTIN) 100 MG capsule Take 100 mg by mouth 3 (three) times a day. Plus different dose at bedtime     gabapentin (NEURONTIN) 100 MG capsule Take 200 mg by mouth bedtime.     glatiramer (COPAXONE) 40 mg/mL Syrg injection Inject 40 mg under the skin 3 (three) times a week. Tu, Th, Sat      multivitamin with minerals (THERA-M) 9 mg iron-400 mcg Tab tablet Take 1 tablet by mouth daily.     nystatin-triamcinolone (MYCOLOG II) cream Apply topically 2 (two) times a day as needed.     OMEGA-3/DHA/EPA/FISH OIL (FISH OIL-OMEGA-3 FATTY ACIDS) 300-1,000 mg capsule Take 1 g by mouth daily.     omeprazole (PRILOSEC) 20 MG capsule Take 20 mg by mouth 2 (two) times a day.     oxyCODONE (ROXICODONE) 5 MG immediate release tablet Take 5 mg by mouth every 4 (four) hours as needed for pain.     oxyCODONE (ROXICODONE) 5 MG immediate release tablet Take 5 mg by mouth.     PARoxetine (PAXIL-CR) 25 MG 24 hr tablet Take 25 mg by mouth daily.     polyethylene glycol (MIRALAX) 17 gram packet Take 17 g by mouth daily as needed.      traMADol (ULTRAM) 50 mg tablet Take 1 tablet (50 mg total) by mouth every 6 (six) hours as needed for pain. If patient gets drowsy or develops confusion or his mental status worsens  Or patient his having significant or worsening respiratory failure.  , then hold narcotics and call on call hospitalist for further directions     ALLERGIES:   Allergies   Allergen Reactions     Blood-Group Specific Substance Unknown     Other reaction(s): Other (See Comments)  Patient has a history of a clinically significant antibody against RBC antigens.  A delay in compatible RBCs may occur.  Patient has Anti-E   Blood Product orders may be delayed.  Draw one red top and two purple top tubes for ALL Type and Screen/ Type and Crossmatch orders.      Aspartame Nausea And Vomiting     NUTRA SWEET POWDER     Buchu-Cornsilk- Grass-Hydran Other (See Comments)     Should not use diuretics due to risk of hypotension with hypertrophic cardiomyopathy      Cephalexin Other (See Comments)     Nausea and vomiting  Nausea and vomiting     Fructose Nausea Only     Fructose 5 % Other (See Comments)     Furosemide Other (See Comments)     Hypertrophic cardiomyopathy     Hydrochlorothiazide Other (See Comments)     HCTZ, Lasix,  "Spironolactone  Cannot take any diuretics due to known hypertropic cardiomyopathy     Spironolactone Other (See Comments)     Hypertrophic cardiomyopathy  Cannot take any diuretics due to known hypertrophic cardiomyopathy and risk of hypotension from these.     Sulfamethoxazole-Trimethoprim Other (See Comments)     Rapidly resolved off the medication     Lanolin Rash     DIET: Regular.    Vitals:    02/19/17 1020   BP: 129/63   Pulse: 75   Resp: 16   Temp: 97.9  F (36.6  C)   Weight: 163 lb 6.4 oz (74.1 kg)   Height: 5' 5\" (1.651 m)     EXAMINATION:   General: Pleasant, alert and oriented middle-aged female, up in her wheelchair, in no apparent distress.   Head: Normocephalic and atraumatic.  Eyes: PERRLA, sclerae clear.   ENT: Moist oral mucosa.   Cardiovascular: Regular rate and rhythm. Coarse 4/6 pansystolic ejection murmur at the left sternal border.  Respiratory: Lungs clear to auscultation bilaterally.   Abdomen: Soft and nontender.   Musculoskeletal/Extremities: Right foot inversion deformity. No current lower extremity edema (previously 3+ pedal edema bilaterally).  Integument: Wounds as noted in the HPI.  Cognitive/Psychiatric: Alert and oriented times 3. Affect is euthymic.    DIAGNOSTICS:   Results for orders placed or performed in visit on 10/24/16   Basic Metabolic Panel   Result Value Ref Range    Sodium 142 136 - 145 mmol/L    Potassium 3.7 3.5 - 5.0 mmol/L    Chloride 104 98 - 107 mmol/L    CO2 29 22 - 31 mmol/L    Anion Gap, Calculation 9 5 - 18 mmol/L    Glucose 77 70 - 125 mg/dL    Calcium 10.1 8.5 - 10.5 mg/dL    BUN 9 8 - 22 mg/dL    Creatinine 0.52 (L) 0.60 - 1.10 mg/dL    GFR MDRD Af Amer >60 >60 mL/min/1.73m2    GFR MDRD Non Af Amer >60 >60 mL/min/1.73m2     Lab Results   Component Value Date    WBC 2.9 (L) 10/24/2016    HGB 10.0 (L) 10/24/2016    HCT 32.6 (L) 10/24/2016    MCV 87 10/24/2016     10/24/2016     CrCl cannot be calculated (Patient has no serum creatinine result on " file.).    ASSESSMENT/Plan:      ICD-10-CM    1. Multiple sclerosis G35    2. Neurogenic bladder N31.9    3. Recurrent nephrolithiasis N20.0    4. Decubitus ulcer of buttock, left, stage III L89.323    5. Chronic pain syndrome G89.4    6. Muscle spasms of both lower extremities M62.838    7. Depression, unspecified depression type F32.9    8. History of recurrent UTIs Z87.440      CHANGES:    None.    CARE PLAN:    The care plan has been reviewed and all orders signed. Changes to care plan, if any, as noted. Otherwise, continue care plan of care.      Electronically signed by:  Pedro Terrell CNP

## 2021-06-09 NOTE — PROGRESS NOTES
[facility]    Facility:   [facility] [[id number]]   Code Status: FULL CODE       Chief Complaint   Patient presents with     Review Of Multiple Medical Conditions       HPI:   Aga is a 66 y.o. female with multiple sclerosis and neurogenic bladder for over 20 years. For the latter, she does her own straight catheterization through a Mitrofanoff umbilical device. She does have recurrent urinary tract infections. Prior to her admission to long-term care, she had been living in a one level condo by herself with care attendants coming daily from 0800 to 1400 and from 2100 to 0300. They generally helped with straight catheterization, ADLs, and errands. She fell from her powered wheelchair/scooter on 07/26/16 and sustained bilateral tibial closed tibial fractures and also a left maxillary sinus wall fracture and left periorbital laceration that did require sutures. She also sustained a trace subarachnoid hemorrhage (repeat head CT was stable). She apparently struck her left shoulder as well.  She also has T3-5 compression fractures of indeterminate age and hypertrophic cardiomyopathy with no evidence of acute heart failure. She has chronic respiratory failure (related to her MS) and is on 2L of O2 at night.    A CT scan on 11/16/16 showed significant bilateral staghorn calculi. She underwent a left percutaneous nephrolithotomy on 12/15/16 and was discharged the following day. A CT scan on postoperative day 1 showed a small residual stone fragment, and she was discharged with a follow-up scheduled to remove the remaining stone fragment which I believe occurred on 12/19/16. She has been receiving Augmentin prophylactically via PICC line, but that is going to be discontinued today. She will continue with oral Augmentin for 30 days. She is again scheduled for a right percutaneous nephrolithotomy on 01/12/17.    She denies any chest pains, coughing or congestion, nausea or vomiting,  dizziness or dyspnea, dysuria, difficulty chewing or swallowing, or problems with appetite or sleep. Bowels are now moving well. She does have PRN MiraLAX available.       Past Medical History:  Past Medical History:   Diagnosis Date     Cardiac murmur      Chronic pain      GI bleed 11-     Hypertrophic cardiomyopathy x 15 yrs     Hypotension 11/18/2014     Multiple sclerosis      Neurogenic bladder      Pressure ulcer     right coccyx     SAH (subarachnoid hemorrhage) 7/27/2016     Tibial fracture left     Urinary calculi      UTI (lower urinary tract infection)      UTI (urinary tract infection) 7/28/2016       Medications:  Most accurate list exists at the [facility] facility.  Current Outpatient Prescriptions   Medication Sig Note     atenolol (TENORMIN) 25 MG tablet Take 25 mg by mouth 2 (two) times a day.      baclofen (LIORESAL) 20 MG tablet Take 10 mg by mouth 4 (four) times a day as needed (muscle spasms). See scheduled dosing.      calcium-vitamin D (CALCIUM-VITAMIN D) 500 mg(1,250mg) -200 unit per tablet Take 1 tablet by mouth daily.      cholecalciferol, vitamin D3, 1,000 unit tablet Take 1,000 Units by mouth daily.       collagenase ointment Apply Santyl to wound daily; apply thick layer (osacr thickness)      ferrous sulfate 325 (65 FE) MG tablet Take 1 tablet by mouth daily with breakfast.      fluticasone (FLONASE) 50 mcg/actuation nasal spray 1 spray into each nostril daily as needed.  1/4/2015: Uses infrequently     food supplemt, lactose-reduced (BOOST HIGH PROTEIN) Liqd Take 1 Can by mouth daily.      gabapentin (NEURONTIN) 100 MG capsule Take 100 mg by mouth 3 (three) times a day. Plus different dose at bedtime      gabapentin (NEURONTIN) 100 MG capsule Take 200 mg by mouth bedtime.      glatiramer (COPAXONE) 40 mg/mL Syrg injection Inject 40 mg under the skin 3 (three) times a week. Tu, Th, Sat      multivitamin with minerals (THERA-M) 9 mg iron-400 mcg Tab tablet Take 1 tablet by mouth  daily.      nystatin-triamcinolone (MYCOLOG II) cream Apply topically 2 (two) times a day as needed.      OMEGA-3/DHA/EPA/FISH OIL (FISH OIL-OMEGA-3 FATTY ACIDS) 300-1,000 mg capsule Take 1 g by mouth daily.      omeprazole (PRILOSEC) 20 MG capsule Take 20 mg by mouth 2 (two) times a day.      oxyCODONE (ROXICODONE) 5 MG immediate release tablet Take 5 mg by mouth every 4 (four) hours as needed for pain.      oxyCODONE (ROXICODONE) 5 MG immediate release tablet Take 5 mg by mouth. 1/31/2017: Received from: [facility]     PARoxetine (PAXIL-CR) 25 MG 24 hr tablet Take 25 mg by mouth daily.      polyethylene glycol (MIRALAX) 17 gram packet Take 17 g by mouth daily as needed.       traMADol (ULTRAM) 50 mg tablet Take 1 tablet (50 mg total) by mouth every 6 (six) hours as needed for pain. If patient gets drowsy or develops confusion or his mental status worsens  Or patient his having significant or worsening respiratory failure.  , then hold narcotics and call on call hospitalist for further directions        Physical Exam:  General: Patient is alert, pleasant female, lying in bed, appears comfortable.  HEENT: Head is NCAT. Eyes show no injection or icterus. Nares negative. Oropharynx well hydrated.  Neck: Supple. No tenderness or adenopathy. No JVD.  Lungs: Clear bilaterally. No wheezes.  Cardiovascular: Regular rate and rhythm, normal S1. S2.  Back: No spinal or CVA tenderness.  Abdomen: Soft, no tenderness on exam. Bowel sounds present. No guarding rebound or rigidity.  : Deferred.  Extremities: No edema is noted.  Musculoskeletal: Joint deformities.  Psych: Mood appears good.      Assessment/Plan:  1. Renal calculi. S/p PNT and subsequent removal. Follow up with urology. Question nephrostomy tube placement per patient.  2. Hypertrophic CM. Long standing. No acute issues.  3. MS. Stable on Copaxone, baclofen.  4. Neurogenic bladder. Has Mitrofanoff.  5. HTN. BPs acceptable. Continue Atenolol.        Electronically  signed by: Astrid Marroquin MD

## 2021-06-10 NOTE — PROGRESS NOTES
[facility]    Name:   Aga Fraga  : 1950  Facility:   [facility] [[id number]]   Room: 328B  Code Status: FULL CODE -   Fac type:   NF (Long Term Care, LTC) -     CHIEF COMPLAINT / REASON FOR VISIT:  Chief Complaint   Patient presents with     Review Of Multiple Medical Conditions      Patient was last seen by me on 17 and subsequently seen by Dr. Marroquin on 17.    HPI: Aga is a 66 y.o. female with multiple sclerosis and neurogenic bladder for over 20 years. For the latter, she does her own straight catheterization through a Mitrofanoff umbilical device. She does have recurrent urinary tract infections. Prior to her admission to long-term care, she had been living in a one level condo by herself with care attendants coming daily from 0800 to 1400 and from 2100 to 0300. They generally helped with straight catheterization, ADLs, and errands.     She fell from her powered wheelchair/scooter on 16 and sustained bilateral tibial closed tibial fractures and also a left maxillary sinus wall fracture and left periorbital laceration that did require sutures. She also sustained a trace subarachnoid hemorrhage (repeat head CT was stable). She apparently struck her left shoulder as well.      She also has T3-5 compression fractures of indeterminate age and hypertrophic cardiomyopathy with no evidence of acute heart failure. She has chronic respiratory failure (related to her MS) and is on 2L of O2 at night.  Recently, however, she has complained of feeling congested.  She says this has been going on for about a month.  She occasionally coughs up yellow sputum.  In auscultating her lungs, I can appreciate some faint rhonchi on the right, while the left lung is clear.  I could appreciate something similar to her the end of December.  Chest x-rays were negative.  Given her current symptoms, we will obtain chest x-rays once again.    She had developed a  "right buttock decubitus ulcer (stage II) that she had prior to hospitalization, and dressing changes are done daily. Unfortunately, I noted another decubitus below the left buttock at my last visit that is stage III and fairly deep. She has been seen at the wound clinic, most recently on 01/31/17, and her wounds are doing well enough that she only needs to return to the wound clinic on a PRN basis. Pain is managed well with scheduled TID tramadol and PRN dosing. This is not only for her generalized pain but for headaches she has \"all the time.\"  The tramadol has proven more effective than oxycodone (which she has not been using).  We are going to discontinue the latter.    She has ongoing issues with muscle spasms. She has a baclofen pump and is receiving 10 mg of baclofen QID, an effective dose. The pump was just filled yesterday, and I believe they are planning a replacement.    She has been taking paroxetine for depression and denies any current feelings of depression or anxiety.    She has undergone a revision of her Mitrofanoff and bilateral nephrolithotomies. A CT scan on 11/16/16 showed significant bilateral staghorn calculi. She underwent a left percutaneous nephrolithotomy on 12/15/16 and was discharged the following day. A CT scan on postoperative day 1 showed a small residual stone fragment, and she was discharged with a follow-up scheduled to remove the remaining stone fragment which I believe occurred on 12/19/16. She was subsequently scheduled for a right percutaneous nephrolithotomy on 01/12/17 with a readmission visit by Dr. Marroquin on 01/23/17.    She denies any chest pains, nausea or vomiting, dizziness or dyspnea, dysuria, difficulty chewing or swallowing, or problems with appetite or sleep. Bowels are now moving well. She does have PRN MiraLAX available.     Past Medical History:   Diagnosis Date     Cardiac murmur      Chronic pain      GI bleed 11-     Hypertrophic cardiomyopathy x 15 " yrs     Hypotension 11/18/2014     Multiple sclerosis      Neurogenic bladder      Pressure ulcer     right coccyx     SAH (subarachnoid hemorrhage) 7/27/2016     Tibial fracture left     Urinary calculi      UTI (lower urinary tract infection)      UTI (urinary tract infection) 7/28/2016              Family History   Problem Relation Age of Onset     Cancer Mother      Lung     Diabetes Mother      Hypertension Mother      No Medical Problems Father      in [place]     No Medical Problems Daughter      No Medical Problems Daughter      Cardiomyopathy Sister      Social History     Social History     Marital status:      Spouse name: N/A     Number of children: N/A     Years of education: N/A     Social History Main Topics     Smoking status: Former Smoker     Packs/day: 0.50     Years: 2.00     Quit date: 1/1/1974     Smokeless tobacco: Never Used     Alcohol use No     Drug use: No     Sexual activity: Yes     Birth control/ protection: Surgical     Other Topics Concern     Not on file     Social History Narrative     MEDICATIONS: Reviewed from the MAR, physician orders, and earlier progress notes.   Current Outpatient Prescriptions   Medication Sig     atenolol (TENORMIN) 25 MG tablet Take 25 mg by mouth 2 (two) times a day.     baclofen (LIORESAL) 20 MG tablet Take 10 mg by mouth 4 (four) times a day as needed (muscle spasms). See scheduled dosing.     calcium-vitamin D (CALCIUM-VITAMIN D) 500 mg(1,250mg) -200 unit per tablet Take 1 tablet by mouth daily.     cholecalciferol, vitamin D3, 1,000 unit tablet Take 1,000 Units by mouth daily.      collagenase ointment Apply Santyl to wound daily; apply thick layer (oscar thickness)     ferrous sulfate 325 (65 FE) MG tablet Take 1 tablet by mouth daily with breakfast.     fluticasone (FLONASE) 50 mcg/actuation nasal spray 1 spray into each nostril daily as needed.      food supplemt, lactose-reduced (BOOST HIGH PROTEIN) Liqd Take 1 Can by mouth daily.      gabapentin (NEURONTIN) 100 MG capsule Take 100 mg by mouth 3 (three) times a day. Plus different dose at bedtime     gabapentin (NEURONTIN) 100 MG capsule Take 200 mg by mouth bedtime.     glatiramer (COPAXONE) 40 mg/mL Syrg injection Inject 40 mg under the skin 3 (three) times a week. Tu, Th, Sat     multivitamin with minerals (THERA-M) 9 mg iron-400 mcg Tab tablet Take 1 tablet by mouth daily.     nystatin-triamcinolone (MYCOLOG II) cream Apply topically 2 (two) times a day as needed.     OMEGA-3/DHA/EPA/FISH OIL (FISH OIL-OMEGA-3 FATTY ACIDS) 300-1,000 mg capsule Take 1 g by mouth daily.     omeprazole (PRILOSEC) 20 MG capsule Take 20 mg by mouth 2 (two) times a day.     oxyCODONE (ROXICODONE) 5 MG immediate release tablet Take 5 mg by mouth every 4 (four) hours as needed for pain.     oxyCODONE (ROXICODONE) 5 MG immediate release tablet Take 5 mg by mouth.     PARoxetine (PAXIL-CR) 25 MG 24 hr tablet Take 25 mg by mouth daily.     polyethylene glycol (MIRALAX) 17 gram packet Take 17 g by mouth daily as needed.      traMADol (ULTRAM) 50 mg tablet Take 1 tablet (50 mg total) by mouth every 6 (six) hours as needed for pain. If patient gets drowsy or develops confusion or his mental status worsens  Or patient his having significant or worsening respiratory failure.  , then hold narcotics and call on call hospitalist for further directions     ALLERGIES:   Allergies   Allergen Reactions     Blood-Group Specific Substance Unknown     Other reaction(s): Other (See Comments)  Patient has a history of a clinically significant antibody against RBC antigens.  A delay in compatible RBCs may occur.  Patient has Anti-E   Blood Product orders may be delayed.  Draw one red top and two purple top tubes for ALL Type and Screen/ Type and Crossmatch orders.      Aspartame Nausea And Vomiting     NUTRA SWEET POWDER     Buchu-Cornsilk-Ch Grass-Hydran Other (See Comments)     Should not use diuretics due to risk of hypotension with  "hypertrophic cardiomyopathy      Cephalexin Other (See Comments)     Nausea and vomiting  Nausea and vomiting     Fructose Nausea Only     Fructose 5 % Other (See Comments)     Furosemide Other (See Comments)     Hypertrophic cardiomyopathy     Hydrochlorothiazide Other (See Comments)     HCTZ, Lasix, Spironolactone  Cannot take any diuretics due to known hypertropic cardiomyopathy     Spironolactone Other (See Comments)     Hypertrophic cardiomyopathy  Cannot take any diuretics due to known hypertrophic cardiomyopathy and risk of hypotension from these.     Sulfamethoxazole-Trimethoprim Other (See Comments)     Rapidly resolved off the medication     Lanolin Rash     DIET: Regular.    Vitals:    04/13/17 1341   BP: 118/64   Pulse: 80   Resp: 18   Temp: 97.7  F (36.5  C)   Weight: 139 lb 6.4 oz (63.2 kg)   Height: 5' 5\" (1.651 m)     EXAMINATION:   General: Pleasant, alert and oriented middle-aged female, up in her wheelchair, in no apparent distress.   Head: Normocephalic and atraumatic.  Eyes: PERRLA, sclerae clear.   ENT: Moist oral mucosa.   Cardiovascular: Regular rate and rhythm. Coarse 4/6 pansystolic ejection murmur at the left sternal border.  Respiratory: Lungs clear to auscultation bilaterally.   Abdomen: Soft and nontender.   Musculoskeletal/Extremities: Right foot inversion deformity. No current lower extremity edema (previously 3+ pedal edema bilaterally).  Integument: Wounds as noted in the HPI.  Cognitive/Psychiatric: Alert and oriented times 3. Affect is euthymic.    DIAGNOSTICS:   Results for orders placed or performed in visit on 10/24/16   Basic Metabolic Panel   Result Value Ref Range    Sodium 142 136 - 145 mmol/L    Potassium 3.7 3.5 - 5.0 mmol/L    Chloride 104 98 - 107 mmol/L    CO2 29 22 - 31 mmol/L    Anion Gap, Calculation 9 5 - 18 mmol/L    Glucose 77 70 - 125 mg/dL    Calcium 10.1 8.5 - 10.5 mg/dL    BUN 9 8 - 22 mg/dL    Creatinine 0.52 (L) 0.60 - 1.10 mg/dL    GFR MDRD Af Amer >60 >60 " mL/min/1.73m2    GFR MDRD Non Af Amer >60 >60 mL/min/1.73m2     Lab Results   Component Value Date    WBC 2.9 (L) 10/24/2016    HGB 10.0 (L) 10/24/2016    HCT 32.6 (L) 10/24/2016    MCV 87 10/24/2016     10/24/2016     CrCl cannot be calculated (Patient has no serum creatinine result on file.).    ASSESSMENT/Plan:      ICD-10-CM    1. Multiple sclerosis G35    2. Neurogenic bladder N31.9    3. Pulmonary congestion R09.89    4. Chronic pain syndrome G89.4    5. Muscle spasms of both lower extremities M62.838    6. Depression, unspecified depression type F32.9    7. History of recurrent UTIs Z87.440      CHANGES:    None.    CARE PLAN:    The care plan has been reviewed and all orders signed. Changes to care plan, if any, as noted. Otherwise, continue care plan of care.      Electronically signed by:  Pedro Terrell, DARRION

## 2021-06-10 NOTE — PROGRESS NOTES
"[facility]    Name:   Aga Fraga  : 1950  Facility:   [facility] [[id number]]   Room: 328B  Code Status: FULL CODE -   Fac type:   NF (Long Term Care, LTC) -     CHIEF COMPLAINT / REASON FOR VISIT:  Chief Complaint   Patient presents with     Review Of Multiple Medical Conditions     Particular attention paid to acute urinary tract infection.      Patient was last seen by me on 17.    HPI: Aga is a 66 y.o. female with multiple sclerosis and neurogenic bladder (with urinary retention) for over 20 years. For the latter, she does her own straight catheterization through a Mitrofanoff umbilical device.  She does this 4 times daily using sterile technique.  Due to this and her immunosuppressive medications for MS, she is at high risk for recurrent urinary tract infections.  We have just received results of her latest urine culture that shows MRSA.  As a result of her MS, patient is immunosuppressed.    She also has chronic respiratory failure (related to her MS) and is on 2L of O2 at night.  When last seen, she complained of feeling congested.  She stated that it had been going on for about a month, and she occasionally coughed up yellow sputum.  In auscultating her lungs, I could appreciate some faint rhonchi on the right, while the left lung was clear.  I could appreciate similar symptoms the end of November (with negative chest x-ray results); however, we did obtain repeats.  Once again, there were no acute findings.  A swab for influenza was also negative.    Her cough has persisted, however, and as a result, she has developed an ache in her right upper chest \"going on for 2-1/2 months.\"  I suspect it is from coughing.  Auscultating her lungs, I can appreciate bronchial noises, although lungs are otherwise clear.  I had planned on initiating a Z-Christian for bronchitis (considering the length of time she has been suffering with this), but given that she is " "also MRSA positive in her urine we are going to treat her with levofloxacin 500 mg once a day for 7 days.    She had been complaining about \"allergies,\" and we did start her on loratadine 10 mg daily on 04/27/17.  We will readdress this once her bronchitis clears up.    Pain is managed well with scheduled TID tramadol and PRN dosing. This is not only for her generalized pain but for headaches she has \"all the time.\"  The tramadol has proven more effective than oxycodone (which she was not been using), and we discontinued it.    She has ongoing issues with muscle spasms. She has a baclofen pump and is receiving 10 mg of baclofen QID, an effective dose. The pump was just filled yesterday, and I believe they are planning a replacement.    She has been taking paroxetine for depression and denies any current feelings of depression or anxiety.    She had developed a right buttock decubitus ulcer (stage II) that she had prior to hospitalization. Unfortunately, I noted another decubitus below the left buttock during the visit in December or January. It was stage III and fairly deep.  She was seen at the wound clinic, and her wounds improved enough that she only needs to return to the wound clinic on a PRN basis.     INJURY HISTORY:  She fell from her powered wheelchair/scooter on 07/26/16 and sustained bilateral tibial closed tibial fractures and also a left maxillary sinus wall fracture and left periorbital laceration that did require sutures. She also sustained a trace subarachnoid hemorrhage (repeat head CT was stable). She apparently struck her left shoulder as well.      She also has T3-5 compression fractures of indeterminate age and hypertrophic cardiomyopathy with no evidence of acute heart failure.     BLADDER AND KIDNEY STONE HISTORY:  She has undergone a revision of her Mitrofanoff and bilateral nephrolithotomies. A CT scan on 11/16/16 showed significant bilateral staghorn calculi. She underwent a left " "percutaneous nephrolithotomy on 12/15/16 and was discharged the following day. A CT scan on postoperative day 1 showed a small residual stone fragment, and she was discharged with a follow-up scheduled to remove the remaining stone fragment which I believe occurred on 12/19/16. She was subsequently scheduled for a right percutaneous nephrolithotomy on 01/12/17.    REVIEW OF SYSTEMS:  When I initially asked her how she is doing, she tells me, \"not good.\"  She tells me she is sure she has a urinary tract infection (urine culture followed later).  She denies any nausea or vomiting, dizziness or dyspnea, difficulty chewing or swallowing, or problems with appetite or sleep. Bowels are now moving well. She does have PRN MiraLAX available.     MEDICATION CHANGES: Since my last visit, we discontinued oxycodone (as noted above) and have also decreased her omeprazole from 20 mg twice daily down to 20 mg daily per pharmacy consult recommendation.    Past Medical History:   Diagnosis Date     Cardiac murmur      Chronic pain      GI bleed 11-     Hypertrophic cardiomyopathy x 15 yrs     Hypotension 11/18/2014     Multiple sclerosis      Neurogenic bladder      Pressure ulcer     right coccyx     SAH (subarachnoid hemorrhage) 7/27/2016     Tibial fracture left     Urinary calculi      UTI (lower urinary tract infection)      UTI (urinary tract infection) 7/28/2016              Family History   Problem Relation Age of Onset     Cancer Mother      Lung     Diabetes Mother      Hypertension Mother      No Medical Problems Father      in [place]     No Medical Problems Daughter      No Medical Problems Daughter      Cardiomyopathy Sister      Social History     Social History     Marital status:      Spouse name: N/A     Number of children: N/A     Years of education: N/A     Social History Main Topics     Smoking status: Former Smoker     Packs/day: 0.50     Years: 2.00     Quit date: 1/1/1974     Smokeless tobacco: Never " Used     Alcohol use No     Drug use: No     Sexual activity: Yes     Birth control/ protection: Surgical     Other Topics Concern     Not on file     Social History Narrative     MEDICATIONS: Reviewed from the MAR, physician orders, and earlier progress notes.  Updated by me today (05/04/17) with discontinuation of oxycodone, reduction of omeprazole from twice daily dosing to daily dosing, and the introduction of loratadine for rhinitis reflected below.  Current Outpatient Prescriptions   Medication Sig     loratadine (CLARITIN) 10 mg tablet Take 10 mg by mouth daily.     atenolol (TENORMIN) 25 MG tablet Take 25 mg by mouth 2 (two) times a day.     baclofen (LIORESAL) 20 MG tablet Take 10 mg by mouth 4 (four) times a day as needed (muscle spasms). See scheduled dosing.     calcium-vitamin D (CALCIUM-VITAMIN D) 500 mg(1,250mg) -200 unit per tablet Take 1 tablet by mouth daily.     cholecalciferol, vitamin D3, 1,000 unit tablet Take 1,000 Units by mouth daily.      collagenase ointment Apply Santyl to wound daily; apply thick layer (oscar thickness)     ferrous sulfate 325 (65 FE) MG tablet Take 1 tablet by mouth daily with breakfast.     fluticasone (FLONASE) 50 mcg/actuation nasal spray 1 spray into each nostril daily as needed.      food supplemt, lactose-reduced (BOOST HIGH PROTEIN) Liqd Take 1 Can by mouth daily.     gabapentin (NEURONTIN) 100 MG capsule Take 100 mg by mouth 3 (three) times a day. Plus different dose at bedtime     gabapentin (NEURONTIN) 100 MG capsule Take 200 mg by mouth bedtime.     glatiramer (COPAXONE) 40 mg/mL Syrg injection Inject 40 mg under the skin 3 (three) times a week. Tu, Th, Sat     multivitamin with minerals (THERA-M) 9 mg iron-400 mcg Tab tablet Take 1 tablet by mouth daily.     nystatin-triamcinolone (MYCOLOG II) cream Apply topically 2 (two) times a day as needed.     OMEGA-3/DHA/EPA/FISH OIL (FISH OIL-OMEGA-3 FATTY ACIDS) 300-1,000 mg capsule Take 1 g by mouth daily.      [START ON 9/13/2017] omeprazole (PRILOSEC) 20 MG capsule Take 20 mg by mouth daily.      PARoxetine (PAXIL) 20 MG tablet Take 20 mg by mouth every morning. (start when supply of Paxil CR runs out)     polyethylene glycol (MIRALAX) 17 gram packet Take 17 g by mouth daily as needed.      traMADol (ULTRAM) 50 mg tablet Take 1 tablet (50 mg total) by mouth every 6 (six) hours as needed for pain. If patient gets drowsy or develops confusion or his mental status worsens  Or patient his having significant or worsening respiratory failure.  , then hold narcotics and call on call hospitalist for further directions     ALLERGIES:   Allergies   Allergen Reactions     Blood-Group Specific Substance Unknown     Other reaction(s): Other (See Comments)  Patient has a history of a clinically significant antibody against RBC antigens.  A delay in compatible RBCs may occur.  Patient has Anti-E   Blood Product orders may be delayed.  Draw one red top and two purple top tubes for ALL Type and Screen/ Type and Crossmatch orders.      Aspartame Nausea And Vomiting     NUTRA SWEET POWDER     Buchu-Cornsilk-Ch Grass-Hydran Other (See Comments)     Should not use diuretics due to risk of hypotension with hypertrophic cardiomyopathy      Cephalexin Other (See Comments)     Nausea and vomiting  Nausea and vomiting     Fructose Nausea Only     Fructose 5 % Other (See Comments)     Furosemide Other (See Comments)     Hypertrophic cardiomyopathy     Hydrochlorothiazide Other (See Comments)     HCTZ, Lasix, Spironolactone  Cannot take any diuretics due to known hypertropic cardiomyopathy     Spironolactone Other (See Comments)     Hypertrophic cardiomyopathy  Cannot take any diuretics due to known hypertrophic cardiomyopathy and risk of hypotension from these.     Sulfamethoxazole-Trimethoprim Other (See Comments)     Rapidly resolved off the medication     Lanolin Rash     DIET: Regular.    Vitals:    05/04/17 1156   BP: 112/61   Pulse: 61  "  Resp: 20   Temp: 98.2  F (36.8  C)   Weight: 138 lb (62.6 kg)   Height: 5' 5\" (1.651 m)     EXAMINATION:   General: Pleasant, alert and oriented middle-aged female, lying in bed, in no apparent distress.   Head: Normocephalic and atraumatic.  Eyes: PERRLA, sclerae clear.   ENT: Moist oral mucosa.   Cardiovascular: Regular rate and rhythm. Coarse 4/6 pansystolic ejection murmur at the LSB.  Respiratory: Lungs clear to auscultation bilaterally.   Abdomen: Soft and nontender.   Musculoskeletal/Extremities: Right foot inversion deformity. No current lower extremity edema (previously 3+ pedal edema bilaterally).  Integument: Wounds as noted in the HPI.  Cognitive/Psychiatric: Alert and oriented times 3. Affect is euthymic.    DIAGNOSTICS:   Results for orders placed or performed in visit on 10/24/16   Basic Metabolic Panel   Result Value Ref Range    Sodium 142 136 - 145 mmol/L    Potassium 3.7 3.5 - 5.0 mmol/L    Chloride 104 98 - 107 mmol/L    CO2 29 22 - 31 mmol/L    Anion Gap, Calculation 9 5 - 18 mmol/L    Glucose 77 70 - 125 mg/dL    Calcium 10.1 8.5 - 10.5 mg/dL    BUN 9 8 - 22 mg/dL    Creatinine 0.52 (L) 0.60 - 1.10 mg/dL    GFR MDRD Af Amer >60 >60 mL/min/1.73m2    GFR MDRD Non Af Amer >60 >60 mL/min/1.73m2     Lab Results   Component Value Date    WBC 2.9 (L) 10/24/2016    HGB 10.0 (L) 10/24/2016    HCT 32.6 (L) 10/24/2016    MCV 87 10/24/2016     10/24/2016     CrCl cannot be calculated (Patient has no serum creatinine result on file.).    ASSESSMENT/Plan:      ICD-10-CM    1. Multiple sclerosis G35    2. Neurogenic bladder N31.9    3. Urinary tract infection associated with cystostomy catheter, initial encounter T83.510A     N39.0    4. Bronchitis J40    5. Chronic pain syndrome G89.4    6. Muscle spasms of both lower extremities M62.838    7. Depression, unspecified depression type F32.9      CHANGES:    Start levofloxacin 500 mg 1 tab daily ×7 days for UTI (MRSA) and bronchitis.    CARE PLAN:  "   The care plan has been reviewed and all orders signed. Changes to care plan, if any, as noted. Otherwise, continue care plan of care.  Time spent with this patient was approximately 35 minutes, with greater than 50% spent in counseling and coordination of care that included addressing multiple issues related to this patient's comfort.  Orders were eventually written for antibiotics.      Electronically signed by:  Pedro Terrell CNP

## 2021-06-11 NOTE — PROGRESS NOTES
[facility]      Facility:    [facility] [[id number]]  Code Status: FULL CODE       Chief Complaint/Reason for Visit:  Chief Complaint   Patient presents with     H & P     Re-admit to LTC after hospitalization for cardomyopathy with pulmonary congestion, chronic respiratory failure, pneumonia. Hx MS, neurogenic bladder. (H & P 7/12/17).       HPI:   Aga is a 66 y.o. female resident of NH with past medical history of hypertrophic CM, multiple sclerosis with neurogenic bladder and recurrent urinary tract infections, history of upper GI bleed from duodenal ulcer, hypertension, respiratory failure, anemia who was sent to [facility] on 6/21/17 due to fever, productive cough and shortness of breath.  Her temp was measured and noted to be 102.1 prior to transfer to hospital.  The patient reported a history of cough which is at times productive with yellow sputum for the past 2-3 weeks.  She felt short of breath more than her baseline, she is on 2 LPM of oxygen at night.  Does not recall any sick contacts and had been screened for influenza at the nursing home recently she was negative.  The patient was treated with Levaquin and previously Z-Christian for presumed bronchitis but symptoms persisted.  Patient denied any abdominal pain, nausea or emesis.  She denied any urinary symptoms but has Mitrofanoff umbilical device and self catheterizes herself with the help of staff at the nursing facility.  She has history of recurrent urinary tract infections.  The patient does not report any recent evidence of melena, hematochezia or bright red blood per rectum.  History of upper GI bleed in 2014 and 2015 attributed to duodenal ulcer, which was embolized but had recurrence of GI bleed a month later and underwent endoscopic hemostasis.  Patient is not on any antiplatelets or anticoagulation. She denies the use of any NSAIDs. On admission febrile with T-max 101.5, desaturated so was placed on  "oxygen 5 L/min. with workup remarkable for hemoglobin of 5.3, WBC 12.4, pro calcitonin 0.58, lactic acid 1, serum creatinine 0.51.  CT of the chest shows multifocal pneumonia.  Transfusion of 2 units pRBC's was ordered. She was started on broad-spectrum antibiotics in the emergency department for sepsis.     Patient was treated with IV gentle diuretics and IV antibiotics.  Patient started feeling better.  Her hypoxia improved.  Her symptoms also improved.  Her cough improved.  No more fevers.  Patient finished a course of levofloxacin.  Patient's WBC count was trending down.  Echocardiogram was also performed on June 26 which showed ejection fraction of 83% with no significant valvular disease but severe outflow tract obstruction.  Patient has been on atenolol for similar reason in the past.  Patient refused to have ICD.  Cardiology evaluated and recommended to discharge patient on atenolol and avoid diuretics to prevent worsening outlet obstruction. Diuretics given in the hospital due to concern for fluid overload. Oxygen needs improved and she got back to 2 L of nasal cannula oxygen which she has been chronically on.  Patient also has a history of recurrent UTI, this time UA was not suggestive of UTI.  She has baclofen pump due to muscle spasticity due to MS.  She was discharged back to nursing home on June 28, 2017.  No significant changes were made in medication.    FYI: Note on 6/22/17 per hospitalist:  \"Repeat Hgb x 2 at 10.5 and 10.4 without the patient receiving blood products which likely translates to earlier Hgb at 5.3 was likely erroneous.  Fecal occult blood test negative the patient denies any history of melena, hematochezia or bright red blood per rectum.  Given this information earlier hemoglobin was lab error and we held 2 units of blood transfusion was ordered earlier.\"       FYI: Per Dr. Estrella cardiology consult dated 6/28/17:   Assessment:   1.  Hypertrophic obstructive cardiomyopathy with family " history of SCD.  This is not a new diagnosis for the patient.  She is currently on a atenolol and is currently asymptomatic.  Has not wanted to pursue consideration of a defibrillator in the past and this was expressed again today during visit.  2.  Nonsustained ventricular tachycardia.  Likely secondary to problem #1.  Had discussion with patient regarding ICD placement, and patient is not interested in this at this time.  Continue atenolol.  3.  Diastolic dysfunction.  Again likely secondary to problem #1.  Patient has been diuresing with small doses of oral furosemide.  She appears euvolemic at this time.  Diuretics are relatively contraindicated in patients with obstructive cardiomyopathy.  Heart failure appears to have resolved.  Would discontinue diuretic at this point to avoid hypovolemia and worsening outflow tract obstruction.  4.  Acute on chronic respiratory failure.  Patient requires 2 L of oxygen at home.  Patient likely has a component of restrictive lung disease given her MS.  This is acutely exacerbated given her pneumonia and possibly mild CHF exacerbation  5.  Multilobar pneumonia.  Improving with course of antibiotics.  Currently on Levaquin  6.  Chronic normocytic anemia.  Patient does have a history of upper GI bleed.  However no evidence of this during this admission.  Hemoglobin has been stable.      Plan:   1.  Continue atenolol at home dose.  Symptoms appear to be well managed at this dose  2.  Discontinue furosemide, as patient is euvolemic and diuretics could worsen obstruction if she becomes dehydrated.     Okay to discharge from a cardiac standpoint. Nothing more to add, cardiology will sign off. Please call with questions.      Today she is feeling better. Almost back to baseline. Minimal cough. No shortness of breath over baseline. No chest pain. Appetite is fair. No fever. In bed or in her motorized wheelchair which she can operate but cannot get in to by herself. Relies on staff for  much of her cares. Non ambulatory. Has Mitrofanoff for neurogenic bladder, cath QID and prn. No abdominal pain. Hx of renal calculi, follows at [facility]. No specific concerns per nursing. No diarrhea or constipation. Denies dizziness. No new vision or hearing problems.       Past Medical History:  Past Medical History:   Diagnosis Date     Cardiac murmur      Chronic pain      GI bleed 11-     Hypertrophic cardiomyopathy x 15 yrs     Hypotension 11/18/2014     Multiple sclerosis      Neurogenic bladder      Pressure ulcer     right coccyx     SAH (subarachnoid hemorrhage) 7/27/2016     Tibial fracture left     Urinary calculi      UTI (lower urinary tract infection)      UTI (urinary tract infection) 7/28/2016           Surgical History:  Past Surgical History:   Procedure Laterality Date     BACLOFEN PUMP IMPLANTATION      and revision in 2014     BLADDER MITROFANOFF CONTINENT VESICOSTOMY       CHOLECYSTECTOMY       ESOPHAGOGASTRODUODENOSCOPY N/A 11/18/2014    Procedure: ESOPHAGOGASTRODUODENOSCOPY with bipolar cautery, epinephrine 1/10 dilution injection and resolution clip x2;  Surgeon: Jhoan Arguello MD;  Location: [facility];  Service:      ESOPHAGOGASTRODUODENOSCOPY N/A 1/4/2015    Procedure: ESOPHAGOGASTRODUODENOSCOPY with bicap, cautery, injection of epi/saline;  Surgeon: Ricky King MD;  Location: [facility];  Service:      HYSTERECTOMY       LITHOTRIPSY       PROGRAMABLE BACLOFEN PUMP REVISION  4/2014       Family History:   Family History   Problem Relation Age of Onset     Cancer Mother      Lung     Diabetes Mother      Hypertension Mother      No Medical Problems Father      in NH     No Medical Problems Daughter      No Medical Problems Daughter      Cardiomyopathy Sister        Social History:    Social History     Social History     Marital status:      Spouse name: N/A     Number of children: N/A     Years of education: N/A     Social History Main Topics     Smoking status:  Former Smoker     Packs/day: 0.50     Years: 2.00     Quit date: 1/1/1974     Smokeless tobacco: Never Used     Alcohol use No     Drug use: No     Sexual activity: Yes     Birth control/ protection: Surgical     Other Topics Concern     Not on file     Social History Narrative        Medications:  Current Outpatient Prescriptions   Medication Sig Note     acetaminophen (TYLENOL) 325 MG tablet Take 2 tablets (650 mg total) by mouth every 4 (four) hours as needed for pain or fever.      atenolol (TENORMIN) 25 MG tablet Take 1 tablet (25 mg total) by mouth 2 (two) times a day.      baclofen (LIORESAL) 10 MG tablet Take 1 tablet (10 mg total) by mouth 2 (two) times a day.      baclofen (LIORESAL) 10 MG tablet Take 1 tablet (10 mg total) by mouth 4 (four) times a day as needed (muscle spasm).      benzonatate (TESSALON) 100 MG capsule Take 1 capsule (100 mg total) by mouth every 6 (six) hours as needed for cough.      cholecalciferol, vitamin D3, 1,000 unit tablet Take 1 tablet (1,000 Units total) by mouth daily.      ferrous sulfate 325 (65 FE) MG tablet Take 1 tablet (325 mg total) by mouth daily with breakfast.      fluticasone (FLONASE) 50 mcg/actuation nasal spray 1 spray into each nostril daily as needed.       gabapentin (NEURONTIN) 100 MG capsule Take 100 mg by mouth 3 (three) times a day. Plus different dose at bedtime      gabapentin (NEURONTIN) 100 MG capsule Take 200 mg by mouth at bedtime.      glatiramer (COPAXONE) 40 mg/mL Syrg injection Inject 1 mL (40 mg total) under the skin 3 (three) times a week. Tu, Th, Sat      INSULIN PUMP CARTRIDGE (BACLOFEN PUMP CARTRIDGE) Crtg by Intrathecal route. Baclofen pump 6/21/2017: 6/21/17: Pt has baclofen pump - per EMS it was refilled [facility] at the [facility]     loratadine (CLARITIN) 10 mg tablet Take 1 tablet (10 mg total) by mouth daily.      multivitamin with minerals (THERA-M) 9 mg iron-400 mcg Tab tablet Take 1 tablet by mouth daily.      nystatin  (MYCOSTATIN) powder Apply 1 application topically 2 (two) times a day. (apply to right armpit)      nystatin-triamcinolone (MYCOLOG II) cream Apply topically 2 (two) times a day as needed.      OMEGA-3/DHA/EPA/FISH OIL (FISH OIL-OMEGA-3 FATTY ACIDS) 300-1,000 mg capsule Take 1 g by mouth daily.      [START ON 9/13/2017] omeprazole (PRILOSEC) 20 MG capsule Take 1 capsule (20 mg total) by mouth daily.      PARoxetine (PAXIL) 20 MG tablet Take 1 tablet (20 mg total) by mouth every morning.      polyethylene glycol (MIRALAX) 17 gram packet Take 17 g by mouth daily.       traMADol (ULTRAM) 50 mg tablet Take 1 tablet (50 mg total) by mouth 3 (three) times a day.        Allergies:  Allergies   Allergen Reactions     Blood-Group Specific Substance Unknown     Other reaction(s): Other (See Comments)  Patient has a history of a clinically significant antibody against RBC antigens.  A delay in compatible RBCs may occur.  Patient has Anti-E and Anti-JKb.  Blood Product orders may be delayed.  Draw one red top and two purple top tubes for ALL Type and Screen/ Type and Crossmatch orders.      Aspartame Nausea And Vomiting     NUTRA SWEET POWDER     Buchu-Cornsilk-Ch Grass-Hydran Other (See Comments)     Should not use diuretics due to risk of hypotension with hypertrophic cardiomyopathy      Cephalexin Other (See Comments)     Nausea and vomiting  Nausea and vomiting     Fructose Nausea Only     Fructose 5 % Other (See Comments)     Furosemide Other (See Comments)     Hypertrophic cardiomyopathy     Hydrochlorothiazide Other (See Comments)     HCTZ, Lasix, Spironolactone  Cannot take any diuretics due to known hypertropic cardiomyopathy     Spironolactone Other (See Comments)     Hypertrophic cardiomyopathy  Cannot take any diuretics due to known hypertrophic cardiomyopathy and risk of hypotension from these.     Sulfamethoxazole-Trimethoprim Other (See Comments)     Rapidly resolved off the medication     Lanolin Rash        Review of Systems:  Pertinent items are noted in HPI.      Physical Exam:   General: Patient is alert, pleasant female, lying in bed, O2 per NC.  Vitals: /60, Temp 97.4, Pulse 71, RR 20, O2 sat 94% on O2.  HEENT: Head is NCAT. Eyes show no injection or icterus. Nares negative. Oropharynx well hydrated.  Neck: Torticollis. No tenderness or adenopathy. No JVD.  Lungs: Clear bilaterally. No wheezes.  Cardiovascular: Regular rate and rhythm, loud systolic murmur.  Back: No spinal or CVA tenderness.  Abdomen: Soft, no tenderness on exam. Bowel sounds present. No guarding rebound or rigidity. Mitrofanoff.  : Deferred.  Extremities: No edema is noted.  Musculoskeletal: Muscle wasting and spasticity. Right foot inverted.   Skin: Warm and dry.  Psych: Mood appears good.      Labs:  Component      Latest Ref Rng & Units 6/22/2017 6/22/2017 6/23/2017 6/26/2017          12:33 AM  5:55 AM     WBC      4.0 - 11.0 thou/uL 5.3 4.8 6.0 7.8   RBC      3.80 - 5.40 mill/uL 3.66 (L) 3.52 (L) 4.33 3.95   Hemoglobin      12.0 - 16.0 g/dL 10.4 (L) 10.1 (L) 12.2 11.2 (L)   Hematocrit      35.0 - 47.0 % 31.8 (L) 31.3 (L) 37.9 34.7 (L)   MCV      80 - 100 fL 87 89 88 88   MCH      27.0 - 34.0 pg 28.4 28.7 28.2 28.4   MCHC      32.0 - 36.0 g/dL 32.7 32.3 32.2 32.3   RDW      11.0 - 14.5 % 14.5 14.6 (H) 14.4 14.6 (H)   Platelets      140 - 440 thou/uL 132 (L) 131 (L) 141 201   MPV      8.5 - 12.5 fL 10.4 9.9 11.2 9.5     Component      Latest Ref Rng & Units 6/22/2017 6/22/2017 6/23/2017 6/24/2017           5:55 AM  5:55 AM   6:38 AM   Sodium      136 - 145 mmol/L 138  139 138   Potassium      3.5 - 5.0 mmol/L 3.8  3.6 3.3 (L)   Chloride      98 - 107 mmol/L 107  102 96 (L)   CO2      22 - 31 mmol/L 25  30 34 (H)   Anion Gap, Calculation      5 - 18 mmol/L 6  7 8   Glucose      70 - 125 mg/dL 92  91 98   BUN      8 - 22 mg/dL 11  8 9   Creatinine      0.60 - 1.10 mg/dL 0.44 (L)  0.49 (L) 0.51 (L)   GFR MDRD Af Amer      >60  mL/min/1.73m2 >60  >60 >60   GFR MDRD Non Af Amer      >60 mL/min/1.73m2 >60  >60 >60   Bilirubin, Total      0.0 - 1.0 mg/dL 0.4 0.4 0.4 0.4   Calcium      8.5 - 10.5 mg/dL 8.9  10.1 10.0   Protein, Total      6.0 - 8.0 g/dL 5.8 (L) 5.8 (L) 6.6 6.6   ALBUMIN      3.5 - 5.0 g/dL 2.1 (L) 2.1 (L) 2.3 (L) 2.2 (L)   Alkaline Phosphatase      45 - 120 U/L 83 83 103 96   AST      0 - 40 U/L 81 (H) 81 (H) 86 (H) 65 (H)   ALT      0 - 45 U/L 82 (H) 82 (H) 112 (H) 101 (H)     Component      Latest Ref Rng & Units 6/24/2017 6/25/2017 6/26/2017           9:20 PM     Sodium      136 - 145 mmol/L      Potassium      3.5 - 5.0 mmol/L 4.0 4.3 4.0   Chloride      98 - 107 mmol/L      CO2      22 - 31 mmol/L      Anion Gap, Calculation      5 - 18 mmol/L      Glucose      70 - 125 mg/dL      BUN      8 - 22 mg/dL      Creatinine      0.60 - 1.10 mg/dL  0.51 (L)    GFR MDRD Af Amer      >60 mL/min/1.73m2  >60    GFR MDRD Non Af Amer      >60 mL/min/1.73m2  >60    Bilirubin, Total      0.0 - 1.0 mg/dL   0.3   Calcium      8.5 - 10.5 mg/dL      Protein, Total      6.0 - 8.0 g/dL   6.7   ALBUMIN      3.5 - 5.0 g/dL   2.3 (L)   Alkaline Phosphatase      45 - 120 U/L   81   AST      0 - 40 U/L   44 (H)   ALT      0 - 45 U/L   85 (H)       Assessment/Plan:  1. Cardiomyopathy. With pulm congestion this admission, treated with diuretics in the hospital but generally diuretics to be avoided per recommendation of cardiology (see above).  2. Chronic respiratory failure. On oxygen.  3. Pneumonia. Noted per CT. Done with antibiotics. No active infection, no fever.   4. MS. Long standing, bed bound, non ambulatory. Muscle spasms with baclofen pump.  5. Neurogenic bladder. Has Mitrofanoff.  6. HTN. Cont atenolol.  7. Anemia. Stable, follow periodically. No transfusion rec'd. Presenting hgb of 5.3 was error. She does have hx GIB and is on Protonix.  8. Renal calculi. Has been following with the [facility].  9. Code status is full code.      Total time  greater than 50 minutes, greater than 50% counseling and coordination of care, time spent in interview and examination of patient, review of records, discussion with nursing staff.      Electronically signed by: Astrid Marroquin MD
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
## 2021-06-13 NOTE — PROGRESS NOTES
[facility]    Facility:   [facility] [[id number]]   Code Status: FULL CODE       Chief Complaint   Patient presents with     Review Of Multiple Medical Conditions     [facility] 9/30/17.       HPI:   Aga is a 67 y.o. female seen for routine physician follow up in [facility]. She is a resident of [facility] with past medical history of hypertrophic CM, multiple sclerosis with neurogenic bladder and recurrent urinary tract infections, history of upper GI bleed from duodenal ulcer, hypertension, respiratory failure, anemia who was sent to the hospital on 6/21/17 due to fever, productive cough and shortness of breath.  Her temp was measured and noted to be 102.1 prior to transfer to hospital.  The patient reported a history of cough which is at times productive with yellow sputum for the past 2-3 weeks.  She felt short of breath more than her baseline, she is on 2 LPM of oxygen at night.  Does not recall any sick contacts and had been screened for influenza at the nursing home recently she was negative.  The patient was treated with Levaquin and previously Z-Christian for presumed bronchitis but symptoms persisted.  Patient denied any abdominal pain, nausea or emesis.  She denied any urinary symptoms but has Mitrofanoff umbilical device and self catheterizes herself with the help of staff at the nursing facility.  She has history of recurrent urinary tract infections.  The patient does not report any recent evidence of melena, hematochezia or bright red blood per rectum.  History of upper GI bleed in 2014 and 2015 attributed to duodenal ulcer, which was embolized but had recurrence of GI bleed a month later and underwent endoscopic hemostasis.  Patient is not on any antiplatelets or anticoagulation. She denies the use of any NSAIDs. On admission febrile with T-max 101.5, desaturated so was placed on oxygen 5 L/min. with workup remarkable for hemoglobin of 5.3, WBC 12.4, pro  "calcitonin 0.58, lactic acid 1, serum creatinine 0.51.  CT of the chest shows multifocal pneumonia.  Transfusion of 2 units pRBC's was ordered. She was started on broad-spectrum antibiotics in the emergency department for sepsis.     Patient was treated with IV gentle diuretics and IV antibiotics.  Patient started feeling better.  Her hypoxia improved.  Her symptoms also improved.  Her cough improved.  No more fevers.  Patient finished a course of levofloxacin.  Patient's WBC count was trending down.  Echocardiogram was also performed on June 26 which showed ejection fraction of 83% with no significant valvular disease but severe outflow tract obstruction.  Patient has been on atenolol for similar reason in the past.  Patient refused to have ICD.  Cardiology evaluated and recommended to discharge patient on atenolol and avoid diuretics to prevent worsening outlet obstruction. Diuretics given in the hospital due to concern for fluid overload. Oxygen needs improved and she got back to 2 L of nasal cannula oxygen which she has been chronically on.  Patient also has a history of recurrent UTI, this time UA was not suggestive of UTI.  She has baclofen pump due to muscle spasticity due to MS.  She was discharged back to nursing home on June 28, 2017.  No significant changes were made in medication.    FYI: Note on 6/22/17 per hospitalist:  \"Repeat Hgb x 2 at 10.5 and 10.4 without the patient receiving blood products which likely translates to earlier Hgb at 5.3 was likely erroneous.  Fecal occult blood test negative the patient denies any history of melena, hematochezia or bright red blood per rectum.  Given this information earlier hemoglobin was lab error and we held 2 units of blood transfusion was ordered earlier.\"       Today:  No complaints today. She has occ cough which is baseline. No fever. No shortness of breat at rest. No chest pain. Appetite is fair. Typically she is in bed or in her motorized wheelchair which " she can operate but cannot get in to by herself. Relies on staff for much of her cares. Non ambulatory. Has Mitrofanoff for neurogenic bladder, cath QID and prn. No abdominal pain. Hx of renal calculi, follows at [facility]. No specific concerns per nursing. No diarrhea or constipation. Denies dizziness.       Past Medical History:  Past Medical History:   Diagnosis Date     Cardiac murmur      Chronic pain      GI bleed 11-     Hypertrophic cardiomyopathy x 15 yrs     Hypotension 11/18/2014     Multiple sclerosis      Neurogenic bladder      Pressure ulcer     right coccyx     SAH (subarachnoid hemorrhage) 7/27/2016     Tibial fracture left     Urinary calculi      UTI (lower urinary tract infection)      UTI (urinary tract infection) 7/28/2016        Medications:  Current Outpatient Prescriptions   Medication Sig Note     acetaminophen (TYLENOL) 325 MG tablet Take 2 tablets (650 mg total) by mouth every 4 (four) hours as needed for pain or fever.      atenolol (TENORMIN) 25 MG tablet Take 1 tablet (25 mg total) by mouth 2 (two) times a day.      baclofen (LIORESAL) 10 MG tablet Take 1 tablet (10 mg total) by mouth 2 (two) times a day.      baclofen (LIORESAL) 10 MG tablet Take 1 tablet (10 mg total) by mouth 4 (four) times a day as needed (muscle spasm).      benzonatate (TESSALON) 100 MG capsule Take 1 capsule (100 mg total) by mouth every 6 (six) hours as needed for cough.      cholecalciferol, vitamin D3, 1,000 unit tablet Take 1 tablet (1,000 Units total) by mouth daily.      codeine-guaiFENesin  mg/5 mL Liqd Take 5 mL by mouth 3 (three) times a day as needed.      ferrous sulfate 325 (65 FE) MG tablet Take 1 tablet (325 mg total) by mouth daily with breakfast.      fluticasone (FLONASE) 50 mcg/actuation nasal spray 1 spray into each nostril daily as needed.       gabapentin (NEURONTIN) 100 MG capsule Take 100 mg by mouth 3 (three) times a day. Plus different dose at bedtime      gabapentin  (NEURONTIN) 100 MG capsule Take 200 mg by mouth at bedtime.      glatiramer (COPAXONE) 40 mg/mL Syrg injection Inject 1 mL (40 mg total) under the skin 3 (three) times a week. Tu, Th, Sat      INSULIN PUMP CARTRIDGE (BACLOFEN PUMP CARTRIDGE) Crtg by Intrathecal route. Baclofen pump 6/21/2017: 6/21/17: Pt has baclofen pump - per EMS it was refilled today at the [facility]     loratadine (CLARITIN) 10 mg tablet Take 1 tablet (10 mg total) by mouth daily.      multivitamin with minerals (THERA-M) 9 mg iron-400 mcg Tab tablet Take 1 tablet by mouth daily.      nystatin (MYCOSTATIN) powder Apply 1 application topically 2 (two) times a day. (apply to right armpit)      nystatin-triamcinolone (MYCOLOG II) cream Apply topically 2 (two) times a day as needed.      OMEGA-3/DHA/EPA/FISH OIL (FISH OIL-OMEGA-3 FATTY ACIDS) 300-1,000 mg capsule Take 1 g by mouth daily.      omeprazole (PRILOSEC) 20 MG capsule Take 1 capsule (20 mg total) by mouth daily.      PARoxetine (PAXIL) 20 MG tablet Take 1 tablet (20 mg total) by mouth every morning.      polyethylene glycol (MIRALAX) 17 gram packet Take 17 g by mouth daily.       traMADol (ULTRAM) 50 mg tablet Take 1 tablet (50 mg total) by mouth 3 (three) times a day.        Physical Exam:   General: Patient is alert, pleasant female, up in scooter, O2 per NC.  Vitals: /58, Temp 97.6, Pulse 63, RR 20, O2 sat 94% on O2.  HEENT: Head is NCAT. Eyes show no injection or icterus. Nares negative. Oropharynx well hydrated.  Neck: Torticollis. No tenderness or adenopathy. No JVD.  Lungs: Clear bilaterally. No wheezes.  Cardiovascular: Regular rate and rhythm, loud systolic murmur.  Abdomen: Soft, no tenderness on exam. Bowel sounds present. No guarding rebound or rigidity. Mitrofanoff.  Extremities: No edema is noted.  Musculoskeletal: Muscle wasting and spasticity. Right foot inverted.   Skin: Warm and dry.  Psych: Mood appears good.      Labs:  Component      Latest Ref Rng & Units  6/22/2017 6/22/2017 6/23/2017 6/26/2017          12:33 AM  5:55 AM     WBC      4.0 - 11.0 thou/uL 5.3 4.8 6.0 7.8   RBC      3.80 - 5.40 mill/uL 3.66 (L) 3.52 (L) 4.33 3.95   Hemoglobin      12.0 - 16.0 g/dL 10.4 (L) 10.1 (L) 12.2 11.2 (L)   Hematocrit      35.0 - 47.0 % 31.8 (L) 31.3 (L) 37.9 34.7 (L)   MCV      80 - 100 fL 87 89 88 88   MCH      27.0 - 34.0 pg 28.4 28.7 28.2 28.4   MCHC      32.0 - 36.0 g/dL 32.7 32.3 32.2 32.3   RDW      11.0 - 14.5 % 14.5 14.6 (H) 14.4 14.6 (H)   Platelets      140 - 440 thou/uL 132 (L) 131 (L) 141 201   MPV      8.5 - 12.5 fL 10.4 9.9 11.2 9.5     Component      Latest Ref Rng & Units 6/22/2017 6/22/2017 6/23/2017 6/24/2017           5:55 AM  5:55 AM   6:38 AM   Sodium      136 - 145 mmol/L 138  139 138   Potassium      3.5 - 5.0 mmol/L 3.8  3.6 3.3 (L)   Chloride      98 - 107 mmol/L 107  102 96 (L)   CO2      22 - 31 mmol/L 25  30 34 (H)   Anion Gap, Calculation      5 - 18 mmol/L 6  7 8   Glucose      70 - 125 mg/dL 92  91 98   BUN      8 - 22 mg/dL 11  8 9   Creatinine      0.60 - 1.10 mg/dL 0.44 (L)  0.49 (L) 0.51 (L)   GFR MDRD Af Amer      >60 mL/min/1.73m2 >60  >60 >60   GFR MDRD Non Af Amer      >60 mL/min/1.73m2 >60  >60 >60   Bilirubin, Total      0.0 - 1.0 mg/dL 0.4 0.4 0.4 0.4   Calcium      8.5 - 10.5 mg/dL 8.9  10.1 10.0   Protein, Total      6.0 - 8.0 g/dL 5.8 (L) 5.8 (L) 6.6 6.6   ALBUMIN      3.5 - 5.0 g/dL 2.1 (L) 2.1 (L) 2.3 (L) 2.2 (L)   Alkaline Phosphatase      45 - 120 U/L 83 83 103 96   AST      0 - 40 U/L 81 (H) 81 (H) 86 (H) 65 (H)   ALT      0 - 45 U/L 82 (H) 82 (H) 112 (H) 101 (H)     Component      Latest Ref Rng & Units 6/24/2017 6/25/2017 6/26/2017           9:20 PM     Sodium      136 - 145 mmol/L      Potassium      3.5 - 5.0 mmol/L 4.0 4.3 4.0   Chloride      98 - 107 mmol/L      CO2      22 - 31 mmol/L      Anion Gap, Calculation      5 - 18 mmol/L      Glucose      70 - 125 mg/dL      BUN      8 - 22 mg/dL      Creatinine      0.60 - 1.10  mg/dL  0.51 (L)    GFR MDRD Af Amer      >60 mL/min/1.73m2  >60    GFR MDRD Non Af Amer      >60 mL/min/1.73m2  >60    Bilirubin, Total      0.0 - 1.0 mg/dL   0.3   Calcium      8.5 - 10.5 mg/dL      Protein, Total      6.0 - 8.0 g/dL   6.7   ALBUMIN      3.5 - 5.0 g/dL   2.3 (L)   Alkaline Phosphatase      45 - 120 U/L   81   AST      0 - 40 U/L   44 (H)   ALT      0 - 45 U/L   85 (H)       Assessment/Plan:  1. Cardiomyopathy. Continue medical management, diuretics, follows with cardiology.   2. Chronic respiratory failure. On oxygen.  3. MS. Long standing, bed bound, non ambulatory. Muscle spasms with baclofen pump, recently filled.  4. Neurogenic bladder. Has Mitrofanoff.  5. HTN. Cont atenolol.  6. Renal calculi. Has been following with the [facility].      Overall she is stable, no new orders today.       Electronically signed by: Astrid Marroquin MD
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
## 2021-06-15 NOTE — PROGRESS NOTES
[facility]      Facility:    [facility] [[id number]]  Code Status: FULL CODE       Chief Complaint/Reason for Visit:  Chief Complaint   Patient presents with     H & P     Re-admit to LTC. (H & P 1/31/18).       HPI:   Aga is a 67 y.o. female with past medical history of multiple sclerosis with spasticity and neurogenic bladder, status post Alli channel creation, recurrent UTIs, depression, hypertrophic cardiomyopathy who presented from her long-term care facility with fever, nausea and emesis.  Patient was found to have Proteus UTI and treated with antibiotics.  On hospital day 1 she also had an episode of coughing and hypoxia requiring up to 15 L O2 after eating rice which was likely an aspiration event.  Patient was transferred to the ICU and treated with NIPPV.  She transiently required vasopressors, however quickly improved.  She had video swallow evaluation which demonstrated mild aspiration risk with thin/nectar thick liquids.  Per speech therapist recommendations she was eventually advanced to regular diet and thin liquids.  Patient is not in any respiratory distress and requires only 1 L oxygen via nasal cannula.  Patient was also evaluated by neurology for possible seizure, however EEG showed only nonspecific slowing and no sharp activity.         Past Medical History:  Past Medical History:   Diagnosis Date     Cardiac murmur      Chronic pain      GI bleed 11-     Hypertrophic cardiomyopathy x 15 yrs     Hypotension 11/18/2014     Multiple sclerosis      Neurogenic bladder      Pressure ulcer     right coccyx     SAH (subarachnoid hemorrhage) 7/27/2016     Tibial fracture left     Urinary calculi      UTI (lower urinary tract infection)      UTI (urinary tract infection) 7/28/2016           Surgical History:  Past Surgical History:   Procedure Laterality Date     BACLOFEN PUMP IMPLANTATION      and revision in 2014     BLADDER MITROFANOFF CONTINENT  VESICOSTOMY       CHOLECYSTECTOMY       ESOPHAGOGASTRODUODENOSCOPY N/A 11/18/2014    Procedure: ESOPHAGOGASTRODUODENOSCOPY with bipolar cautery, epinephrine 1/10 dilution injection and resolution clip x2;  Surgeon: Jhoan Arguello MD;  Location: [facility];  Service:      ESOPHAGOGASTRODUODENOSCOPY N/A 1/4/2015    Procedure: ESOPHAGOGASTRODUODENOSCOPY with bicap, cautery, injection of epi/saline;  Surgeon: Ricky King MD;  Location: [facility];  Service:      HYSTERECTOMY       LITHOTRIPSY       PICC  1/5/2018          PROGRAMABLE BACLOFEN PUMP REVISION  4/2014       Family History:   Family History   Problem Relation Age of Onset     Cancer Mother      Lung     Diabetes Mother      Hypertension Mother      No Medical Problems Father      in [place]     No Medical Problems Daughter      No Medical Problems Daughter      Cardiomyopathy Sister        Social History:    Social History     Social History     Marital status:      Spouse name: N/A     Number of children: N/A     Years of education: N/A     Social History Main Topics     Smoking status: Former Smoker     Packs/day: 0.50     Years: 2.00     Quit date: 1/1/1974     Smokeless tobacco: Never Used     Alcohol use No     Drug use: No     Sexual activity: Yes     Birth control/ protection: Surgical     Other Topics Concern     Not on file     Social History Narrative        Medications:  Current Outpatient Prescriptions   Medication Sig Note     acetaminophen (TYLENOL) 325 MG tablet Take 2 tablets (650 mg total) by mouth every 4 (four) hours as needed for pain or fever.      albuterol (PROVENTIL) 2.5 mg /3 mL (0.083 %) nebulizer solution Take 2.5 mg by nebulization every 4 (four) hours as needed for shortness of breath.      atenolol (TENORMIN) 25 MG tablet Take 1 tablet (25 mg total) by mouth 2 (two) times a day.      baclofen (LIORESAL) 10 MG tablet Take 1 tablet (10 mg total) by mouth 2 (two) times a day.      baclofen (LIORESAL) 10 MG tablet  Take 1 tablet (10 mg total) by mouth 4 (four) times a day as needed (muscle spasm).      benzonatate (TESSALON) 100 MG capsule Take 1 capsule (100 mg total) by mouth every 6 (six) hours as needed for cough.      cholecalciferol, vitamin D3, 1,000 unit tablet Take 1 tablet (1,000 Units total) by mouth daily.      cran-vitC-mannose-FOS-bromeln 3,875 mg/30 mL Liqd Take 1 tablet by mouth daily.      ferrous sulfate 325 (65 FE) MG tablet Take 1 tablet (325 mg total) by mouth daily with breakfast.      fluticasone (FLONASE) 50 mcg/actuation nasal spray 1 spray into each nostril daily as needed.       fluticasone-salmeterol (ADVAIR HFA) 45-21 mcg/actuation inhaler Inhale 2 puffs 2 (two) times a day.      gabapentin (NEURONTIN) 100 MG capsule Take 100 mg by mouth 3 (three) times a day. Plus different dose at bedtime 1/4/2018: 0700, 1300, 1700     gabapentin (NEURONTIN) 100 MG capsule Take 200 mg by mouth at bedtime.      glatiramer (COPAXONE) 40 mg/mL Syrg injection Inject 1 mL (40 mg total) under the skin 3 (three) times a week. Tu, Th, Sat      INSULIN PUMP CARTRIDGE (BACLOFEN PUMP CARTRIDGE) Crtg by Intrathecal route. Baclofen pump      levoFLOXacin (LEVAQUIN) 750 MG tablet Take 750 mg by mouth daily. (for 10 days for respiratory infection)      loratadine (CLARITIN) 10 mg tablet Take 1 tablet (10 mg total) by mouth daily.      multivitamin with minerals (THERA-M) 9 mg iron-400 mcg Tab tablet Take 1 tablet by mouth daily.      nystatin-triamcinolone (MYCOLOG II) cream Apply topically 2 (two) times a day as needed.      OMEGA-3/DHA/EPA/FISH OIL (FISH OIL-OMEGA-3 FATTY ACIDS) 300-1,000 mg capsule Take 1 g by mouth daily.      omeprazole (PRILOSEC) 20 MG capsule Take 1 capsule (20 mg total) by mouth daily.      PARoxetine (PAXIL) 20 MG tablet Take 1 tablet (20 mg total) by mouth every morning.      polyethylene glycol (MIRALAX) 17 gram packet Take 17 g by mouth daily.       traMADol (ULTRAM) 50 mg tablet Take 1 tablet (50  mg total) by mouth 3 (three) times a day.        Allergies:  Allergies   Allergen Reactions     Blood-Group Specific Substance Unknown     Other reaction(s): Other (See Comments)  Patient has a history of a clinically significant antibody against RBC antigens.  A delay in compatible RBCs may occur.  Patient has Anti-E and Anti-JKb.  Blood Product orders may be delayed.  Draw one red top and two purple top tubes for ALL Type and Screen/ Type and Crossmatch orders.      Aspartame Nausea And Vomiting     NUTRA SWEET POWDER     Buchu-Cornsilk-Ch Grass-Hydran Other (See Comments)     Should not use diuretics due to risk of hypotension with hypertrophic cardiomyopathy      Cephalexin Other (See Comments)     Nausea and vomiting  Pt unable to verify on 6/16/15  Nausea and vomiting  Nausea and vomiting     Fructose Nausea Only     Fructose 5 % Other (See Comments)     Furosemide Other (See Comments)     Hypertrophic cardiomyopathy     Hydrochlorothiazide Other (See Comments)     HCTZ, Lasix, Spironolactone  Cannot take any diuretics due to known hypertropic cardiomyopathy     Spironolactone Other (See Comments)     Hypertrophic cardiomyopathy  Cannot take any diuretics due to known hypertrophic cardiomyopathy and risk of hypotension from these.     Sulfamethoxazole-Trimethoprim Other (See Comments)     Rapidly resolved off the medication     Lanolin Rash       Review of Systems:  Pertinent items are noted in HPI.      Physical Exam:   General: Patient is alert, pleasant female, lying in bed, appears comfortable.  Vitals: Reviewed.  HEENT: Head is NCAT. Eyes show no injection or icterus. Nares negative. Oropharynx well hydrated.  Neck: Supple. No tenderness or adenopathy. No JVD.  Lungs: Clear bilaterally. No wheezes.  Cardiovascular: Regular rate and rhythm, normal S1. S2.  Back: No spinal or CVA tenderness.  Abdomen: Soft, no tenderness on exam. Bowel sounds present. No guarding rebound or rigidity.  :  Deferred.  Extremities: No edema is noted.  Musculoskeletal: Joint deformities.  Psych: Mood appears good.      Labs:  Results for orders placed or performed during the hospital encounter of 01/04/18   Basic Metabolic Panel   Result Value Ref Range    Sodium 141 136 - 145 mmol/L    Potassium 3.6 3.5 - 5.0 mmol/L    Chloride 108 (H) 98 - 107 mmol/L    CO2 26 22 - 31 mmol/L    Anion Gap, Calculation 7 5 - 18 mmol/L    Glucose 105 70 - 125 mg/dL    Calcium 9.2 8.5 - 10.5 mg/dL    BUN 15 8 - 22 mg/dL    Creatinine 0.51 (L) 0.60 - 1.10 mg/dL    GFR MDRD Af Amer >60 >60 mL/min/1.73m2    GFR MDRD Non Af Amer >60 >60 mL/min/1.73m2       Lab Results   Component Value Date    WBC 7.8 02/02/2018    HGB 11.1 (L) 02/02/2018    HCT 34.7 (L) 02/02/2018    MCV 92 02/02/2018     02/02/2018         Assessment/Plan:  1. UTI. Done with abx.  2. Cardiomyopathy. Follow up with cardiology.  3. Chronic respiratory failure. On oxygen.  4. MS. Long standing, bed bound, non ambulatory. Muscle spasms with baclofen pump.  5. Neurogenic bladder. Has Mitrofanoff.  6. HTN. Cont atenolol. Monitor BPs.   7. Anemia. Stable, follow periodically.   8. Renal calculi. Has been following with the [facility].  9. Code status is full code.    Total time greater than 40 minutes, greater than 50% counseling and coordination of care, time spent in interview and examination of patient, review of records, discussion with nursing staff.        Electronically signed by: Astrid Marroquin MD
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
## 2021-06-17 NOTE — PROGRESS NOTES
[facility]    Facility:   [facility] [[id number]]   Code Status: FULL CODE       Chief Complaint   Patient presents with     Review Of Multiple Medical Conditions     LT 3/31/18.       HPI:   Aga is a 67 y.o. female with past medical history of multiple sclerosis with spasticity and neurogenic bladder, status post Alli channel creation, recurrent UTIs, depression, hypertrophic cardiomyopathy who presented from her long-term care facility with fever, nausea and emesis.  Patient was found to have Proteus UTI and treated with antibiotics.  On hospital day 1 she also had an episode of coughing and hypoxia requiring up to 15 L O2 after eating rice which was likely an aspiration event.  Patient was transferred to the ICU and treated with NIPPV.  She transiently required vasopressors, however quickly improved.  She had video swallow evaluation which demonstrated mild aspiration risk with thin/nectar thick liquids.  Per speech therapist recommendations she was eventually advanced to regular diet and thin liquids.  Patient is not in any respiratory distress and requires only 1 L oxygen via nasal cannula.  Patient was also evaluated by neurology for possible seizure, however EEG showed only nonspecific slowing and no sharp activity.       Past Medical History:  Past Medical History:   Diagnosis Date     Cardiac murmur      Chronic pain      GI bleed 11-     Hypertrophic cardiomyopathy x 15 yrs     Hypotension 11/18/2014     Multiple sclerosis      Neurogenic bladder      Pressure ulcer     right coccyx     SAH (subarachnoid hemorrhage) 7/27/2016     Tibial fracture left     Urinary calculi      UTI (lower urinary tract infection)      UTI (urinary tract infection) 7/28/2016        Medications:  Current Outpatient Prescriptions   Medication Sig     acetaminophen (TYLENOL) 325 MG tablet Take 2 tablets (650 mg total) by mouth every 4 (four) hours as needed for pain or fever.      albuterol (PROVENTIL) 2.5 mg /3 mL (0.083 %) nebulizer solution Take 2.5 mg by nebulization every 4 (four) hours as needed for shortness of breath.     atenolol (TENORMIN) 25 MG tablet Take 1 tablet (25 mg total) by mouth 2 (two) times a day.     baclofen (LIORESAL) 10 MG tablet Take 1 tablet (10 mg total) by mouth 2 (two) times a day.     baclofen (LIORESAL) 10 MG tablet Take 1 tablet (10 mg total) by mouth 4 (four) times a day as needed (muscle spasm).     benzonatate (TESSALON) 100 MG capsule Take 1 capsule (100 mg total) by mouth every 6 (six) hours as needed for cough.     cholecalciferol, vitamin D3, 1,000 unit tablet Take 1 tablet (1,000 Units total) by mouth daily.     cran-vitC-mannose-FOS-bromeln 3,875 mg/30 mL Liqd Take 1 tablet by mouth daily.     ferrous sulfate 325 (65 FE) MG tablet Take 1 tablet (325 mg total) by mouth daily with breakfast.     fluticasone (FLONASE) 50 mcg/actuation nasal spray 1 spray into each nostril daily as needed.      fluticasone-salmeterol (ADVAIR HFA) 45-21 mcg/actuation inhaler Inhale 2 puffs 2 (two) times a day.     gabapentin (NEURONTIN) 100 MG capsule Take 100 mg by mouth 3 (three) times a day. Plus different dose at bedtime (Patient taking differently: Take 100 mg by mouth 3 (three) times a day. [id number])     gabapentin (NEURONTIN) 100 MG capsule Take 200 mg by mouth at bedtime.     glatiramer (COPAXONE) 40 mg/mL Syrg injection Inject 1 mL (40 mg total) under the skin 3 (three) times a week. Tu, Th, Sat     INSULIN PUMP CARTRIDGE (BACLOFEN PUMP CARTRIDGE) Crtg by Intrathecal route. Baclofen pump     loratadine (CLARITIN) 10 mg tablet Take 1 tablet (10 mg total) by mouth daily.     multivitamin with minerals (THERA-M) 9 mg iron-400 mcg Tab tablet Take 1 tablet by mouth daily.     nystatin-triamcinolone (MYCOLOG II) cream Apply topically 2 (two) times a day as needed.     OMEGA-3/DHA/EPA/FISH OIL (FISH OIL-OMEGA-3 FATTY ACIDS) 300-1,000 mg capsule Take 1 g by  mouth daily.     omeprazole (PRILOSEC) 20 MG capsule Take 1 capsule (20 mg total) by mouth daily.     PARoxetine (PAXIL) 10 MG tablet Take 10 mg by mouth every morning.     polyethylene glycol (MIRALAX) 17 gram packet Take 17 g by mouth daily.      traMADol (ULTRAM) 50 mg tablet Take 1 tablet (50 mg total) by mouth 3 (three) times a day.       Physical Exam:   General: Patient is alert, pleasant female, lying in bed, appears comfortable.  Vitals: Reviewed.  HEENT: Head is NCAT. Eyes show no injection or icterus. Nares negative. Oropharynx well hydrated.  Neck: Supple. No tenderness or adenopathy. No JVD.  Lungs: Clear bilaterally. No wheezes.  Cardiovascular: Regular rate and rhythm, normal S1. S2.  Back: No spinal or CVA tenderness.  Abdomen: Soft, no tenderness on exam. Bowel sounds present. No guarding rebound or rigidity.  : Deferred.  Extremities: No edema is noted.  Musculoskeletal: Joint deformities.  Psych: Mood appears good.      Labs:  Results for orders placed or performed during the hospital encounter of 01/04/18   Basic Metabolic Panel   Result Value Ref Range    Sodium 141 136 - 145 mmol/L    Potassium 3.6 3.5 - 5.0 mmol/L    Chloride 108 (H) 98 - 107 mmol/L    CO2 26 22 - 31 mmol/L    Anion Gap, Calculation 7 5 - 18 mmol/L    Glucose 105 70 - 125 mg/dL    Calcium 9.2 8.5 - 10.5 mg/dL    BUN 15 8 - 22 mg/dL    Creatinine 0.51 (L) 0.60 - 1.10 mg/dL    GFR MDRD Af Amer >60 >60 mL/min/1.73m2    GFR MDRD Non Af Amer >60 >60 mL/min/1.73m2       Lab Results   Component Value Date    WBC 7.8 02/02/2018    HGB 11.1 (L) 02/02/2018    HCT 34.7 (L) 02/02/2018    MCV 92 02/02/2018     02/02/2018         Assessment/Plan:  1. Cardiomyopathy. Follow up with cardiology.  2. Chronic respiratory failure. On oxygen.  3. MS. Long standing, bed bound, non ambulatory. Muscle spasms with baclofen pump.  4. Neurogenic bladder. Has Mitrofanoff.    Overall she is stable..      Electronically signed by: Astrid RUTH  MD Cara
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
## 2021-06-17 NOTE — PROGRESS NOTES
"[facility]    Name:   Aga Fraga  : 1950  Facility:   [facility] [[id number]]   Room: 328B  Code Status: FULL CODE -   Fac type:   NF (Long Term Care, LTC) -     CHIEF COMPLAINT / REASON FOR VISIT:  Chief Complaint   Patient presents with     Pre-op Exam     Cystoscopy and deflux procedure into Mitrofanoff/Alli channel due to leakage to be performed by Dr. Neumann at the [facility], on 18.      Patient was last seen by me on 03/15/18 and subsequently seen by ERNESTO Marroquin on 18.    HPI: Aga is a 67 y.o. female with multiple sclerosis and neurogenic bladder for over 20 years. For the latter, she does her own straight catheterization through a previous Mitrofanoff umbilical device (created in ) that was later converted to a Best channel the same year for stenosis. She does have recurrent urinary tract infections, and her latest urine culture that showed MRSA.  As a result of her MS, she is immunosuppressed with the typical organism being Proteus mirabilis.    She has a history of hypertrophic obstructive cardiomyopathy (with diastolic dysfunction).     She also has T3-5 compression fractures of indeterminate age.  Pain is managed well with scheduled TID tramadol and PRN dosing. This is not only for her generalized pain but for headaches she has \"all the time.\"  The tramadol has proven more effective than oxycodone (which she was not been using), and we discontinued it.  She also has pain in the right buttock from a decubitus ulcer (see below).    She has ongoing issues with muscle spasms. She has a baclofen pump and is receiving 10 mg of baclofen QID, an effective dose.       CURRENT ISSUES    Previously, she was complaining that her Mitrofanoff was leaking a good deal.  She was seen by Lynn Lewis PA-C in urology at the [facility], with a note forwarded to Dr. Ruelas about scheduling repeat " Deflux injection into the Best channel, as a problem has worsened over the last 6 months or so.  Previously, leakage was minimal, using 1-2 menstrual pads per day.  Now, she is using 3-5.  Leakage seems to be somewhat continuous but worsens with stress maneuvers, and she denies any leakage per urethra.  She has not been treated for urinary tract infection in the last few months and currently has no symptoms of such.    She has been taking paroxetine for depression, and denying any current feelings of depression or anxiety (and per a pharmacy consult recommendation), we initiated a trial dose reduction of paroxetine from 20 mg to 10 mg daily.  There have been no negative repercussions.    She also has chronic respiratory failure (related to her MS) and is on 2L of O2 at night.  At an earlier visit, she complained of feeling congested with occasional production of yellow sputum.  In auscultating her lungs, I could appreciate some faint rhonchi on the right, while the left lung was clear.  I could appreciate similar symptoms during an earlier visit (with negative chest x-ray results); however, we did obtain repeats.  Once again, there were no acute findings.  A swab for influenza was also negative.  She has been hospitalized for UTI and aspiration pneumonia (see Recurrent Illnesses mentioned below.    ROS: She denies any headaches or chest pains, nausea or vomiting, dizziness or dyspnea (she is on 2 L of oxygen), difficulty chewing or swallowing, or problems with appetite or sleep. Bowels are now moving well. She does have PRN MiraLAX available.       ILLNESSES/HOSPITALIZATIONS    She was sent to the hospital on 06/21/17 due to fever, productive cough, and dyspnea.  Temp was noted to be 102.1 F prior to transfer to the hospital.  She denied any urinary symptoms, abdominal pain or nausea.  On admission, she desaturated and so was placed on 5 L of oxygen per minute with a workup remarkable for a hemoglobin of 5.3  (received 2 units of PRBCs), white count 12.4, pro-calcitonin 0.8, lactic acid 1.  A chest CT showed multifocal pneumonia, and she was started on broad-spectrum antibiotics in the emergency department for sepsis.    She was treated with gentle IV diuretics and IV antibiotics and started to feel better as her hypoxia improved.  She did finish her course of levofloxacin, and her white count was trending down.  She was discharged on atenolol with notes to avoid diuretics to prevent worsening outlet obstruction.  Diuretics given in the hospital were due to concern for fluid overload.  A UA/UC was also obtained but was not suggestive of a UTI.  Note that she also had nonsustained ventricular tachycardia likely secondary to her hypertrophic obstructive cardiomyopathy, and an ICD placement was discussed; however, the patient was not interested.    She was again hospitalized on 01/04/18 when she presented with fever, nausea, and emeses.  She was found (again) to have a UTI with Proteus and treated with antibiotics.  On hospital day 1 she also had an episode of coughing and hypoxia that required up to 15 L of oxygen after eating rice.  This was felt likely an aspiration event.  She was subsequently transferred to the ICU and treated with NIPPV.  She transiently required vasopressors but did improve quickly.  A video swallow study demonstrated mild aspiration risk with thin/nectar thick liquids; however, she was eventually advanced to a regular diet with thin liquids.  She was discharged back here 01/09/18.    On 02/02/18, chest x-rays were ordered, and due to fever and congestion, but there was no definite acute cardiopulmonary process seen.  The CBC with differential also showed a normal white count and only a slight left shift (neutrophils 78%, lymphocytes 10%).  She was also negative for influenza A/B.  She was started on 750 mg of levofloxacin daily for 10 days, and her symptoms resolved without further  intervention.    INJURY HISTORY  She fell from her powered wheelchair/scooter on 07/26/16 and sustained bilateral tibial closed tibial fractures and also a left maxillary sinus wall fracture and left periorbital laceration that did require sutures. She also sustained a trace subarachnoid hemorrhage (repeat head CT was stable). She apparently struck her left shoulder as well.      BLADDER AND KIDNEY STONE HISTORY  She has undergone a revision of her Mitrofanoff and bilateral nephrolithotomies. A CT scan on 11/16/16 showed significant bilateral staghorn calculi. She underwent a left percutaneous nephrolithotomy on 12/15/16 and was discharged the following day. A CT scan on postoperative day 1 showed a small residual stone fragment, and she was discharged with a follow-up scheduled to remove the remaining stone fragment which I believe occurred on 12/19/16. She was subsequently scheduled for a right percutaneous nephrolithotomy on 01/12/17.    Past Medical History:   Diagnosis Date     Cardiac murmur      Chronic pain      GI bleed 11-     Hypertrophic cardiomyopathy x 15 yrs     Hypotension 11/18/2014     Multiple sclerosis      Neurogenic bladder      Pressure ulcer     right coccyx     SAH (subarachnoid hemorrhage) 7/27/2016     Tibial fracture left     Urinary calculi      UTI (lower urinary tract infection)      UTI (urinary tract infection) 7/28/2016              Family History   Problem Relation Age of Onset     Cancer Mother      Lung     Diabetes Mother      Hypertension Mother      No Medical Problems Father      in NH     No Medical Problems Daughter      No Medical Problems Daughter      Cardiomyopathy Sister      Social History     Social History     Marital status:      Spouse name: N/A     Number of children: N/A     Years of education: N/A     Social History Main Topics     Smoking status: Former Smoker     Packs/day: 0.50     Years: 2.00     Quit date: 1/1/1974     Smokeless tobacco:  Never Used     Alcohol use No     Drug use: No     Sexual activity: Yes     Birth control/ protection: Surgical     Other Topics Concern     Not on file     Social History Narrative     MEDICATIONS: Reviewed from the MAR, physician orders, and earlier progress notes.    Current Outpatient Prescriptions   Medication Sig     acetaminophen (TYLENOL) 325 MG tablet Take 2 tablets (650 mg total) by mouth every 4 (four) hours as needed for pain or fever.     albuterol (PROVENTIL) 2.5 mg /3 mL (0.083 %) nebulizer solution Take 2.5 mg by nebulization every 4 (four) hours as needed for shortness of breath.     atenolol (TENORMIN) 25 MG tablet Take 1 tablet (25 mg total) by mouth 2 (two) times a day.     baclofen (LIORESAL) 10 MG tablet Take 1 tablet (10 mg total) by mouth 2 (two) times a day.     baclofen (LIORESAL) 10 MG tablet Take 1 tablet (10 mg total) by mouth 4 (four) times a day as needed (muscle spasm).     benzonatate (TESSALON) 100 MG capsule Take 1 capsule (100 mg total) by mouth every 6 (six) hours as needed for cough.     cholecalciferol, vitamin D3, 1,000 unit tablet Take 1 tablet (1,000 Units total) by mouth daily.     cran-vitC-mannose-FOS-bromeln 3,875 mg/30 mL Liqd Take 1 tablet by mouth daily.     ferrous sulfate 325 (65 FE) MG tablet Take 1 tablet (325 mg total) by mouth daily with breakfast.     fluticasone (FLONASE) 50 mcg/actuation nasal spray 1 spray into each nostril daily as needed.      fluticasone-salmeterol (ADVAIR HFA) 45-21 mcg/actuation inhaler Inhale 2 puffs 2 (two) times a day.     gabapentin (NEURONTIN) 100 MG capsule Take 100 mg by mouth 3 (three) times a day. Plus different dose at bedtime (Patient taking differently: Take 100 mg by mouth 3 (three) times a day. [id number])     gabapentin (NEURONTIN) 100 MG capsule Take 200 mg by mouth at bedtime.     glatiramer (COPAXONE) 40 mg/mL Syrg injection Inject 1 mL (40 mg total) under the skin 3 (three) times a week. Tu, Th, Sat     INSULIN  PUMP CARTRIDGE (BACLOFEN PUMP CARTRIDGE) Crtg by Intrathecal route. Baclofen pump     loratadine (CLARITIN) 10 mg tablet Take 1 tablet (10 mg total) by mouth daily.     multivitamin with minerals (THERA-M) 9 mg iron-400 mcg Tab tablet Take 1 tablet by mouth daily.     nystatin-triamcinolone (MYCOLOG II) cream Apply topically 2 (two) times a day as needed.     OMEGA-3/DHA/EPA/FISH OIL (FISH OIL-OMEGA-3 FATTY ACIDS) 300-1,000 mg capsule Take 1 g by mouth daily.     omeprazole (PRILOSEC) 20 MG capsule Take 1 capsule (20 mg total) by mouth daily.     PARoxetine (PAXIL) 10 MG tablet Take 10 mg by mouth every morning.     polyethylene glycol (MIRALAX) 17 gram packet Take 17 g by mouth daily.      traMADol (ULTRAM) 50 mg tablet Take 1 tablet (50 mg total) by mouth 3 (three) times a day.     ALLERGIES:   Allergies   Allergen Reactions     Blood-Group Specific Substance Unknown     Other reaction(s): Other (See Comments)  Patient has a history of a clinically significant antibody against RBC antigens.  A delay in compatible RBCs may occur.  Patient has Anti-E and Anti-JKb.  Blood Product orders may be delayed.  Draw one red top and two purple top tubes for ALL Type and Screen/ Type and Crossmatch orders.      Aspartame Nausea And Vomiting     NUTRA SWEET POWDER     Buchu-Cornsilk-Ch Grass-Hydran Other (See Comments)     Should not use diuretics due to risk of hypotension with hypertrophic cardiomyopathy      Cephalexin Other (See Comments)     Nausea and vomiting  Pt unable to verify on 6/16/15  Nausea and vomiting  Nausea and vomiting     Fructose Nausea Only     Fructose 5 % Other (See Comments)     Furosemide Other (See Comments)     Hypertrophic cardiomyopathy     Hydrochlorothiazide Other (See Comments)     HCTZ, Lasix, Spironolactone  Cannot take any diuretics due to known hypertropic cardiomyopathy     Spironolactone Other (See Comments)     Hypertrophic cardiomyopathy  Cannot take any diuretics due to known  "hypertrophic cardiomyopathy and risk of hypotension from these.     Sulfamethoxazole-Trimethoprim Other (See Comments)     Rapidly resolved off the medication     Lanolin Rash     DIET: Regular.    Vitals:    04/19/18 1242   BP: 104/62   Pulse: 86   Resp: 20   Temp: 98.2  F (36.8  C)   Weight: 138 lb 12.8 oz (63 kg)   Height: 5' 3\" (1.6 m)     EXAMINATION:   General: Pleasant, alert and oriented middle-aged female, lying in bed, in no apparent distress.   Head: Normocephalic and atraumatic.  Eyes: PERRLA, sclerae clear.   ENT: Moist oral mucosa.   Cardiovascular: Regular rate and rhythm. Coarse 4/6 pansystolic ejection murmur at the LSB.  Respiratory: Lungs are clear to auscultation bilaterally.  Abdomen: Soft and nontender.   Musculoskeletal/Extremities: Right foot inversion deformity.  Collapsed (Charcot foot?) arch on the left. No current lower extremity edema.  Integument: Right buttock wound appears to be doing well.  Apparently, it erupts, goes away, and then returns.  Cognitive/Psychiatric: Alert and oriented times 3. Affect is euthymic.    DIAGNOSTICS:   Results for orders placed or performed during the hospital encounter of 01/04/18   Basic Metabolic Panel   Result Value Ref Range    Sodium 141 136 - 145 mmol/L    Potassium 3.6 3.5 - 5.0 mmol/L    Chloride 108 (H) 98 - 107 mmol/L    CO2 26 22 - 31 mmol/L    Anion Gap, Calculation 7 5 - 18 mmol/L    Glucose 105 70 - 125 mg/dL    Calcium 9.2 8.5 - 10.5 mg/dL    BUN 15 8 - 22 mg/dL    Creatinine 0.51 (L) 0.60 - 1.10 mg/dL    GFR MDRD Af Amer >60 >60 mL/min/1.73m2    GFR MDRD Non Af Amer >60 >60 mL/min/1.73m2     Lab Results   Component Value Date    WBC 6.9 03/16/2018    HGB 11.6 (L) 03/16/2018    HCT 37.0 03/16/2018    MCV 91 03/16/2018     03/16/2018     CrCl cannot be calculated (Patient's most recent sCr result is older than the maximum 5 days allowed.).    ASSESSMENT/Plan:      ICD-10-CM    1. MS (multiple sclerosis) G35    2. Neurogenic bladder " N31.9    3. History of recurrent UTIs Z87.440    4. Cardiomyopathy, unspecified type I42.9    5. Muscle spasticity M62.838    6. Depression, unspecified depression type F32.9    7. Mild cognitive impairment G31.84      CHANGES:    Obtain presurgical urine culture.      Patient is a good risk for planned procedure.    CARE PLAN:    The care plan has been reviewed and all orders signed. Changes to care plan, if any, as noted. Otherwise, continue care plan of care.  Time spent with this patient was approximately 40 minutes, with greater than 50% spent in counseling and coordination of care that included a review of her history as well as recent notes from urology.      Electronically signed by:  Pedro Terrell CNP

## 2021-06-19 NOTE — PROGRESS NOTES
"[facility]    Facility:   [facility] [[id number]]   Code Status: DNR    Chief concern: Patient is seen by me today for admission to the long-term care facility.    Reason for admission: Patient is seen by me for admission to the long-term care facility and H&P, after recently being discharged from [facility] where she was treated for septic shock.  It was felt to be related to a urinary tract infection.  She is currently still being treated with levofloxacin for treatment of this infection.  Since returning back to the nursing home, the nursing staff reports that she seems much more alert than when I saw her earlier this month for an admission to nursing home.  When I spoke with patient, patient had no new concerns.  The nursing staff though did request that I consider consulting wound care for evaluation and management of some pressure sores on her buttocks.  These wounds are currently being treated with a Mipilex dressing.  At time of exam, patient denied any recent fevers or chills.  She is eating and drinking well.  No problems with bowel movements.  She feels like her mood is pretty good.  She denied any problems with breathing.  She feels like her spasms are well controlled using baclofen.  She told me how she really enjoys going to the [facility] to have her baclofen pump refilled.  She enjoys the [facility] area, such as [employer].    The medication list from the nursing home orders, and the medications listed in the Epic EMR are compared, and any new medications to include in Epic are listed as a \"historical medication\".    Current Outpatient Prescriptions   Medication Sig Dispense Refill     acetaminophen (TYLENOL) 325 MG tablet Take 2 tablets (650 mg total) by mouth every 6 (six) hours as needed.  0     albuterol (PROVENTIL) 2.5 mg /3 mL (0.083 %) nebulizer solution Take 3 mL (2.5 mg total) by nebulization every 4 (four) " hours as needed for shortness of breath.  0     atenolol (TENORMIN) 25 MG tablet Take 1 tablet (25 mg total) by mouth 2 (two) times a day.  0     baclofen (LIORESAL) 10 MG tablet Take 1 tablet (10 mg total) by mouth 2 (two) times a day.  0     baclofen (LIORESAL) 10 MG tablet Take 1 tablet (10 mg total) by mouth 4 (four) times a day as needed (muscle spasm).  0     baclofen (LIORESAL) 2,000 mcg/mL injection Baclofen Intrathecal Pump  Last known refill date : 5/7/18  Next refill date : 6/20/18  Clinic managing pump : [facility]  0     benzonatate (TESSALON) 100 MG capsule Take 1 capsule (100 mg total) by mouth every 6 (six) hours as needed for cough.  0     bisacodyl (DULCOLAX) 10 mg suppository 1 GA q day prn. (Patient taking differently: Insert 10 mg into the rectum daily as needed. )  0     cholecalciferol, vitamin D3, 1,000 unit tablet Take 1 tablet (1,000 Units total) by mouth daily.  0     doxycycline (MONODOX) 100 MG capsule Take 1 capsule (100 mg total) by mouth 2 (two) times a day for 7 days. 14 capsule 0     ferrous sulfate 325 (65 FE) MG tablet Take 1 tablet (325 mg total) by mouth daily with breakfast.  0     fluticasone-salmeterol (ADVAIR HFA) 45-21 mcg/actuation inhaler Inhale 2 puffs 2 (two) times a day. 1 Inhaler 12     furosemide (LASIX) 20 MG tablet Take 0.5 tablets (10 mg total) by mouth every other day.  0     gabapentin (NEURONTIN) 100 MG capsule Take 200 mg by mouth at bedtime.  0     gabapentin (NEURONTIN) 100 MG capsule Take 100 mg by mouth 3 (three) times a day.  0     glatiramer (COPAXONE) 40 mg/mL Syrg injection Inject 1 mL (40 mg total) under the skin 3 (three) times a week. (Patient taking differently: Inject 40 mg under the skin 3 (three) times a week. Mon/Wed/Fri)  0     ipratropium-albuterol (DUO-NEB) 0.5-2.5 mg/3 mL nebulizer Take 3 mL by nebulization 3 (three) times a day.  0     levoFLOXacin (LEVAQUIN) 500 MG tablet Take 1 tablet (500 mg total) by mouth Daily at 6:00 am for 7 days. 7  tablet 0     loratadine (CLARITIN) 10 mg tablet Take 1 tablet (10 mg total) by mouth daily.  0     miconazole (MICOTIN) 2 % powder Apply to folds, inner thigh region, bilateral axilla's and right breast fold. (Patient taking differently: Apply 1 application topically 2 (two) times a day. Apply to folds, inner thigh region, bilateral axilla's and right breast fold.)  0     multivitamin with minerals (THERA-M) 9 mg iron-400 mcg Tab tablet Take 1 tablet by mouth daily.  0     omega 3-dha-epa-fish oil (FISH OIL) 60- mg cap capsule Take 2 capsules by mouth daily.  0     PARoxetine (PAXIL) 10 MG tablet Take 1 tablet (10 mg total) by mouth every morning.  0     polyethylene glycol (MIRALAX) 17 gram packet Take 1 packet (17 g total) by mouth daily.  0     senna-docusate (PERICOLACE) 8.6-50 mg tablet Take 1 tablet by mouth 2 (two) times a day as needed for constipation.  0     traMADol (ULTRAM) 50 mg tablet Take 0.5 tablets (25 mg total) by mouth 3 (three) times a day. 45 tablet 0       Past Medical History:   Diagnosis Date     Cardiac murmur      Chronic pain      GI bleed 11-     Hypertrophic cardiomyopathy (H) x 15 yrs     Hypotension 11/18/2014     Multiple sclerosis (H)      Neurogenic bladder      Pressure ulcer     right coccyx     SAH (subarachnoid hemorrhage) (H) 7/27/2016     Tibial fracture left     Urinary calculi      UTI (lower urinary tract infection)      UTI (urinary tract infection) 7/28/2016      Past Surgical History:   Procedure Laterality Date     BACLOFEN PUMP IMPLANTATION      and revision in 2014     BLADDER MITROFANOFF CONTINENT VESICOSTOMY       CHOLECYSTECTOMY       ESOPHAGOGASTRODUODENOSCOPY N/A 11/18/2014    Procedure: ESOPHAGOGASTRODUODENOSCOPY with bipolar cautery, epinephrine 1/10 dilution injection and resolution clip x2;  Surgeon: Jhoan Arguello MD;  Location: [facility];  Service:      ESOPHAGOGASTRODUODENOSCOPY N/A 1/4/2015    Procedure: ESOPHAGOGASTRODUODENOSCOPY with  bicap, cautery, injection of epi/saline;  Surgeon: Ricky King MD;  Location: [facility];  Service:      HYSTERECTOMY       LITHOTRIPSY       PICC  1/5/2018          PROGRAMABLE BACLOFEN PUMP REVISION  4/2014      Social History     Social History     Marital status:      Spouse name: N/A     Number of children: N/A     Years of education: N/A     Social History Main Topics     Smoking status: Former Smoker     Packs/day: 0.50     Years: 2.00     Quit date: 1/1/1974     Smokeless tobacco: Never Used     Alcohol use No     Drug use: No     Sexual activity: Yes     Birth control/ protection: Surgical     Other Topics Concern     Not on file     Social History Narrative       History   Smoking Status     Former Smoker     Packs/day: 0.50     Years: 2.00     Quit date: 1/1/1974   Smokeless Tobacco     Never Used      Family Medical Hx: Mother has history of cancer, diabetes, and hypertension.  Sister had history of cardiomyopathy.        EXAM: Vital signs: Blood pressure from 07/24/18 is 159/72, heart rate is 76, respirations are 18, temperature is 99.1, SPO2 is 96%.  Weight from 07/23/18 is 135 pounds. Her blood pressure is widely variable on chart review, being in the normal range at times.  General: Vital signs reviewed.  Patient is in no acute appearing distress.  Breathing appears nonlabored.  Patient is alert and oriented to her surroundings.  I asked her what year it was, and she thought it was 2020, then laughed when I corrected her to 2018.  She still seems much more oriented than my last exam.  ENT: No abnormal ear drainage, no rhinorrhea.  No intraoral lesions, or plaques suggestive of thrush.  Eyes: No scleral, lid, or periorbital injection or edema noted.  No eye mattering noted.  Corneas are clear.  Neck: Patient tends to keep her head and neck in a right side bent position.  She was able to move it a little bit more for me today, that in the past.  Heart: Heart rate is regular without  murmur.  Lungs: Lungs are clear to auscultation with good airflow bilaterally.  Skin: Skin is warm and dry without any rash noted.  She has slight bilateral ankle edema.  Neuro: No new focal deficits  Psych: Patient has a very pleasant affect.  She stays on conversation topics well.  I examined patients buttock region with the assistance of female nursing home staff.  I can see on the bilateral superior buttocks a stage I-II pressure ulcer, one in each buttock measuring about 3 cm diameter.    I reviewed her most recent lab work from towards the end of her hospital admission.  Her hemoglobin is slightly low but stable.  Her renal function is good.  Her white blood cell count is in the normal range.      Approximately 35 minutes was spent on patient management, with greater than 50% of this time being spent on medication reconciliation between electronic medical records, review of past medical history and current medical management, and discussion of management with patient and care staff.  Assessment/Plan   1. Hypertension     2. Sepsis (H)  atenolol (TENORMIN) 25 MG tablet    Patient seems to be doing much better on exam.   3. Multiple sclerosis (H)  atenolol (TENORMIN) 25 MG tablet   4. Pressure sore on buttocks     5. Muscle spasticity         Patient Instructions   Order written for wound care consult.  Otherwise, no change for now in current management.  She seems to be doing much better from my last exam.     Hood Persaud, DO

## 2021-06-19 NOTE — PROGRESS NOTES
[facility]    Facility:   [facility] [[id number]]   Code Status: DNR    Recent medical history/chief concerns: Patient is seen by to review multiple medical issues to include needing a history and physical exam for her admission to the long-term care unit.  She was discharged from [facility] on 07/11/2018 after treatment and rehab related to a bilateral pneumonia infection.  At time of exam, patient has no acute concern.  I tried to see her earlier in the day when she was very tired from an activity.  I spoke to nursing staff and working with her, relate that she does get periodically very tired, and when she is very tired, her orientation can be temporarily impaired.  I spoke with her today after she had a long afternoon nap.  During my interview, she denied having any new problems with eating or drinking, or with bowel movements.  Her urinary issues are being addressed with suprapubic catheter.  This arrangement is going well she believes.  She thinks her current treatment using both oral baclofen, and baclofen through a pump which is refilled through the [facility], is going well.  This medication is used to treat spasticity related to her multiple sclerosis.  She is in a good mood when I saw her.  She denied having any recent fevers or chills, or new problems with breathing.  She has had a chronic cough which she states is no worse.  She takes benzonatate for this.    Review of systems: See history of present illness, otherwise negative.     No current facility-administered medications for this visit.      Current Outpatient Prescriptions   Medication Sig Dispense Refill     acetaminophen (TYLENOL) 325 MG tablet Take 2 tablets (650 mg total) by mouth every 6 (six) hours as needed.  0     albuterol (PROVENTIL) 2.5 mg /3 mL (0.083 %) nebulizer solution Take 3 mL (2.5 mg total) by nebulization every 4 (four) hours as needed for shortness of breath.   0     atenolol (TENORMIN) 25 MG tablet Take 1 tablet (25 mg total) by mouth 2 (two) times a day.  0     baclofen (LIORESAL) 10 MG tablet Take 1 tablet (10 mg total) by mouth 2 (two) times a day.  0     baclofen (LIORESAL) 10 MG tablet Take 1 tablet (10 mg total) by mouth 4 (four) times a day as needed (muscle spasm).  0     baclofen (LIORESAL) 2,000 mcg/mL injection Baclofen Intrathecal Pump  Last known refill date : 5/7/18  Next refill date : 6/20/18  Clinic managing pump : [facility]  0     benzonatate (TESSALON) 100 MG capsule Take 1 capsule (100 mg total) by mouth every 6 (six) hours as needed for cough.  0     bisacodyl (DULCOLAX) 10 mg suppository 1 [place] q day prn. (Patient taking differently: Insert 10 mg into the rectum daily as needed. )  0     cholecalciferol, vitamin D3, 1,000 unit tablet Take 1 tablet (1,000 Units total) by mouth daily.  0     ferrous sulfate 325 (65 FE) MG tablet Take 1 tablet (325 mg total) by mouth daily with breakfast.  0     fluticasone-salmeterol (ADVAIR HFA) 45-21 mcg/actuation inhaler Inhale 2 puffs 2 (two) times a day. 1 Inhaler 12     furosemide (LASIX) 20 MG tablet Take 0.5 tablets (10 mg total) by mouth every other day.  0     gabapentin (NEURONTIN) 100 MG capsule Take 200 mg by mouth at bedtime.  0     gabapentin (NEURONTIN) 100 MG capsule Take 100 mg by mouth 3 (three) times a day.  0     glatiramer (COPAXONE) 40 mg/mL Syrg injection Inject 1 mL (40 mg total) under the skin 3 (three) times a week. (Patient taking differently: Inject 40 mg under the skin 3 (three) times a week. Mon/Wed/Fri)  0     ipratropium-albuterol (DUO-NEB) 0.5-2.5 mg/3 mL nebulizer Take 3 mL by nebulization 3 (three) times a day.  0     loratadine (CLARITIN) 10 mg tablet Take 1 tablet (10 mg total) by mouth daily.  0     miconazole (MICOTIN) 2 % powder Apply to folds, inner thigh region, bilateral axilla's and right breast fold. (Patient taking differently: Apply 1 application topically 2 (two) times a day.  Apply to folds, inner thigh region, bilateral axilla's and right breast fold.)  0     multivitamin with minerals (THERA-M) 9 mg iron-400 mcg Tab tablet Take 1 tablet by mouth daily.  0     omega 3-dha-epa-fish oil (FISH OIL) 60- mg cap capsule Take 2 capsules by mouth daily.  0     PARoxetine (PAXIL) 10 MG tablet Take 1 tablet (10 mg total) by mouth every morning.  0     polyethylene glycol (MIRALAX) 17 gram packet Take 1 packet (17 g total) by mouth daily.  0     senna-docusate (PERICOLACE) 8.6-50 mg tablet Take 1 tablet by mouth 2 (two) times a day as needed for constipation.  0     traMADol (ULTRAM) 50 mg tablet Take 0.5 tablets (25 mg total) by mouth 3 (three) times a day. 30 tablet 0     Facility-Administered Medications Ordered in Other Visits   Medication Dose Route Frequency Provider Last Rate Last Dose     lidocaine (PF) 10 mg/mL (1 %) injection 1-5 mL (XYLOCAINE-MPF)  1-5 mL Intradermal Once PRN Anjana Guallpa MD         norepinephrine 4 mg/250 ml in D5W (0.016 mg/ml)  2-30 mcg/min Intravenous Continuous Anjana Guallpa MD 11.3 mL/hr at 07/18/18 1153 3 mcg/min at 07/18/18 1153     potassium chloride in water 10 mEq in 100 mL  10 mEq Intravenous Once Anjana Guallpa MD         sodium chloride flush 3 mL (NS)  3 mL Intravenous Line Care Anjana Guallpa MD         vancomycin 1.25 g in sodium chloride 0.9% 500 mL (VANCOCIN)  1.25 g Intravenous Once Anjana Guallpa .5 mL/hr at 07/18/18 1019 1.25 g at 07/18/18 1019       Past Medical History:   Diagnosis Date     Cardiac murmur      Chronic pain      GI bleed 11-     Hypertrophic cardiomyopathy (H) x 15 yrs     Hypotension 11/18/2014     Multiple sclerosis (H)      Neurogenic bladder      Pressure ulcer     right coccyx     SAH (subarachnoid hemorrhage) (H) 7/27/2016     Tibial fracture left     Urinary calculi      UTI (lower urinary tract infection)      UTI (urinary tract infection) 7/28/2016      Past Surgical History:   Procedure  Laterality Date     BACLOFEN PUMP IMPLANTATION      and revision in 2014     BLADDER MITROFANOFF CONTINENT VESICOSTOMY       CHOLECYSTECTOMY       ESOPHAGOGASTRODUODENOSCOPY N/A 11/18/2014    Procedure: ESOPHAGOGASTRODUODENOSCOPY with bipolar cautery, epinephrine 1/10 dilution injection and resolution clip x2;  Surgeon: Jhoan Arguello MD;  Location: [facility];  Service:      ESOPHAGOGASTRODUODENOSCOPY N/A 1/4/2015    Procedure: ESOPHAGOGASTRODUODENOSCOPY with bicap, cautery, injection of epi/saline;  Surgeon: Ricky King MD;  Location: [facility];  Service:      HYSTERECTOMY       LITHOTRIPSY       PICC  1/5/2018          PROGRAMABLE BACLOFEN PUMP REVISION  4/2014      Social History     Social History     Marital status:      Spouse name: N/A     Number of children: N/A     Years of education: N/A     Social History Main Topics     Smoking status: Former Smoker     Packs/day: 0.50     Years: 2.00     Quit date: 1/1/1974     Smokeless tobacco: Never Used     Alcohol use No     Drug use: No     Sexual activity: Yes     Birth control/ protection: Surgical     Other Topics Concern     Not on file     Social History Narrative       History   Smoking Status     Former Smoker     Packs/day: 0.50     Years: 2.00     Quit date: 1/1/1974   Smokeless Tobacco     Never Used        Exam:   Blood pressure 90/60, pulse 60, temperature 97  F (36.1  C), resp. rate 18, SpO2 93 %, not currently breastfeeding.    EXAM:   General: Vital signs reviewed. Patient is in no acute appearing distress with a very pleasant affect, smiling often.  Speech is clear and normally fluent.  Breathing is non labored appearing.  No coughing during exam.  Patient is alert and oriented to her immediate surroundings.  I asked her with the name of the facility was where she was living, and she could not recall the name.  I asked her what year it was, and she initially said 2002, then 2010.  I clarified for her where she was living, in  the year.  HEENT: No rhinorrhea noted.  No abnormal ear drainage.  No intraoral lesions or plaque suggestive of Candida infection.  Eyes: Pupils are equal round and reactive to light with normal pupil size.  Patient has normal consensual gaze in all 6 cardinal directions.  Neck: Patient tends to hold head and neck in a right side bend position, which I suspect is her chronic position related to her spasticity.  Heart rate is regular with normal rhythm.  Lungs: Patient has bilateral crackles in the bases, with good airflow in all lung fields.  The crackles I suspect is related to recent bilateral pneumonia.  Skin/extremities: Skin is warm and dry, without peripheral lower extremity edema.  Neuro: Significant for spasticity, most notable in the neck.  For operation of her electronic wheelchair, she requested that I assist her with the controls while going out to a late supper.  Psych: Patient has a very normal pleasant affect.  She makes good eye contact, and smiles often.  She stays on conversation topics well.  Studies: I reviewed her most recent BMP, and CBC earlier this month from last hospitalization.  She had a mildly low hemoglobin which was trending towards normal.  Her BMP showed good renal function and normal electrolytes.  Approximately 45 minutes spent in patient management, with greater than 50% of this time spent on review of past medical history, and discussion of medical management with patient and care staff.  Assessment/Plan   1. Multiple sclerosis (H)     2. Impairment of cognitive function     3. Spasticity      Patient being treated with baclofen pump, plus oral baclofen.   4. History of pneumonia     5. Depression      Currently well managed on current medication.   6. Suprapubic catheter (H)     7. Chronic pain disorder      Controlled on current medication regimen.   8. Status post insertion of intrathecal baclofen pump         Patient Instructions   I spoke with nursing staff when I brought  patient out for dinner about her cognitive status, specifically her not knowing what year it was, and her inability to state where she was currently living.  I requested that they monitor her cognitive status through the evening for any worsening.  On completion of this dictated note, I noted that she was transferred to the hospital for evaluation due to change in mental status.  Her change in mental status could be multifactorial, to include any acute event such as a CVA, a change in her multiple sclerosis condition, or cognitive changes related to infection.  I will review her medical management while in the hospital upon discharge back to the long-term care facility, when she will need readmission to the facility.     Hood Persaud, DO

## 2021-06-20 NOTE — PROGRESS NOTES
[facility]    Facility:   [facility] [[id number]]   Code Status: DNR    Recent medical history/chief concerns: Chief concern of patient at time of exam, is that patient has uncontrolled pain in her right mastoid and occipital area, and right ankle. She was recently diagnosed with mastoiditis, and and right tibia fracture.  I reviewed the documentation from her time in the emergency department the day before my exam.  Evaluation there noted right otitis media and right mastoiditis, and a subacute fracture of her distal right tibia.  She was placed on oral treatment using Augmentin for 10 days.  Patient is not getting adequate relief of her discomfort with tramadol, and would like additional pain relief.  I suggested taking a lower dosage of oxycodone as needed for as short a period of time as possible, which she was agreeable with.  Other than her pain control issues, patient is doing well overall, and feels her current medication management is adequate.  She has felt feverish, but review of recent temperatures in the long-term care facility does not reflect this.  She has no new problems with eating or drinking, breathing, or bowel or bladder function.    Review of systems: See history of present illness, otherwise negative.     Current Outpatient Prescriptions   Medication Sig Dispense Refill     acetaminophen (TYLENOL) 325 MG tablet Take 2 tablets (650 mg total) by mouth every 6 (six) hours as needed.  0     albuterol (PROVENTIL) 2.5 mg /3 mL (0.083 %) nebulizer solution Take 3 mL (2.5 mg total) by nebulization every 4 (four) hours as needed for shortness of breath.  0     amoxicillin-clavulanate (AUGMENTIN) 875-125 mg per tablet Take 1 tablet by mouth every 12 (twelve) hours for 10 days. 20 tablet 0     atenolol (TENORMIN) 25 MG tablet Take 1 tablet (25 mg total) by mouth 2 (two) times a day.  0     baclofen (LIORESAL) 10 MG tablet Take 1 tablet (10 mg total) by mouth  2 (two) times a day.  0     baclofen (LIORESAL) 10 MG tablet Take 1 tablet (10 mg total) by mouth 4 (four) times a day as needed (muscle spasm).  0     baclofen (LIORESAL) 2,000 mcg/mL injection Baclofen Intrathecal Pump  Last known refill date : 5/7/18  Next refill date : 6/20/18  Clinic managing pump : [facility]  0     bisacodyl (DULCOLAX) 10 mg suppository 1 [place] q day prn. (Patient taking differently: Insert 10 mg into the rectum daily as needed. )  0     cholecalciferol, vitamin D3, 1,000 unit tablet Take 1 tablet (1,000 Units total) by mouth daily.  0     ferrous sulfate 325 (65 FE) MG tablet Take 1 tablet (325 mg total) by mouth daily with breakfast.  0     fluticasone-salmeterol (ADVAIR HFA) 45-21 mcg/actuation inhaler Inhale 2 puffs 2 (two) times a day. 1 Inhaler 12     furosemide (LASIX) 20 MG tablet Take 0.5 tablets (10 mg total) by mouth every other day.  0     gabapentin (NEURONTIN) 100 MG capsule Take 200 mg by mouth at bedtime.  0     gabapentin (NEURONTIN) 100 MG capsule Take 100 mg by mouth 3 (three) times a day.  0     glatiramer (COPAXONE) 40 mg/mL Syrg injection Inject 1 mL (40 mg total) under the skin 3 (three) times a week. (Patient taking differently: Inject 40 mg under the skin 3 (three) times a week. Mon/Wed/Fri)  0     ipratropium-albuterol (DUO-NEB) 0.5-2.5 mg/3 mL nebulizer Take 3 mL by nebulization 3 (three) times a day.  0     loratadine (CLARITIN) 10 mg tablet Take 1 tablet (10 mg total) by mouth daily.  0     miconazole (MICOTIN) 2 % powder Apply to folds, inner thigh region, bilateral axilla's and right breast fold. (Patient taking differently: Apply 1 application topically 2 (two) times a day. Apply to folds, inner thigh region, bilateral axilla's and right breast fold.)  0     multivitamin with minerals (THERA-M) 9 mg iron-400 mcg Tab tablet Take 1 tablet by mouth daily.  0     omega 3-dha-epa-fish oil (FISH OIL) 60- mg cap capsule Take 2 capsules by mouth daily.  0     oxyCODONE  (ROXICODONE) 5 MG immediate release tablet Take 1 tablet (5 mg total) by mouth every 4 (four) hours as needed for pain (For relief of severe pain.). 45 tablet 0     PARoxetine (PAXIL) 10 MG tablet Take 1 tablet (10 mg total) by mouth every morning.  0     polyethylene glycol (MIRALAX) 17 gram packet Take 1 packet (17 g total) by mouth daily.  0     senna-docusate (PERICOLACE) 8.6-50 mg tablet Take 1 tablet by mouth 2 (two) times a day as needed for constipation.  0     traMADol (ULTRAM) 50 mg tablet Take 0.5 tablets (25 mg total) by mouth 3 (three) times a day. Give medication at 8 AM, 2 PM, and 2 PM. 45 tablet 1     No current facility-administered medications for this visit.        Past Medical History:   Diagnosis Date     Cardiac murmur      Chronic pain      GI bleed 11-     Hypertrophic cardiomyopathy (H) x 15 yrs     Hypotension 11/18/2014     Multiple sclerosis (H)      Neurogenic bladder      Pressure ulcer     right coccyx     SAH (subarachnoid hemorrhage) (H) 7/27/2016     Tibial fracture left     Urinary calculi      UTI (lower urinary tract infection)      UTI (urinary tract infection) 7/28/2016      Past Surgical History:   Procedure Laterality Date     BACLOFEN PUMP IMPLANTATION      and revision in 2014     BLADDER MITROFANOFF CONTINENT VESICOSTOMY       CHOLECYSTECTOMY       ESOPHAGOGASTRODUODENOSCOPY N/A 11/18/2014    Procedure: ESOPHAGOGASTRODUODENOSCOPY with bipolar cautery, epinephrine 1/10 dilution injection and resolution clip x2;  Surgeon: Jhoan Arguello MD;  Location: [facility];  Service:      ESOPHAGOGASTRODUODENOSCOPY N/A 1/4/2015    Procedure: ESOPHAGOGASTRODUODENOSCOPY with bicap, cautery, injection of epi/saline;  Surgeon: Ricky King MD;  Location: [facility];  Service:      HYSTERECTOMY       LITHOTRIPSY       PICC  1/5/2018          PROGRAMABLE BACLOFEN PUMP REVISION  4/2014      Social History     Social History     Marital status:      Spouse name: N/A      Number of children: N/A     Years of education: N/A     Social History Main Topics     Smoking status: Former Smoker     Packs/day: 0.50     Years: 2.00     Quit date: 1/1/1974     Smokeless tobacco: Never Used     Alcohol use No     Drug use: No     Sexual activity: Yes     Birth control/ protection: Surgical     Other Topics Concern     Not on file     Social History Narrative       History   Smoking Status     Former Smoker     Packs/day: 0.50     Years: 2.00     Quit date: 1/1/1974   Smokeless Tobacco     Never Used        Exam:   Vitals:    09/10/18 0914 09/12/18 1227   BP: 122/56    Pulse: 68    Resp: 18    Temp: 98.5  F (36.9  C)    SpO2: 96%    Weight:  135 lb 12.8 oz (61.6 kg)       EXAM:   General: Vital signs reviewed.  Patient is in no acute appearing distress while she is seen while lying in bed.  Breathing appears nonlabored.  Patient is alert and oriented ×3.  She is very pleasant, and speech is clear and fluent.  ENT: Patient has some tenderness to her right occipital area of skull.  No visible edema or discoloration noted tear, or in her outer right ear.  No abnormal ear drainage noted.  No rhinorrhea noted.  No intraoral lesions or plaques noted.  Neck: No JVD.  Patient chronically tends to have her head turned towards the left due to her spasticity.  Heart: Heart rate is regular without murmur.  Lungs: Lungs are clear to auscultation with good airflow bilaterally.  Abdomen:  Abdomen is soft, nontender.  No palpable abnormal masses or organomegaly.  Bowel sounds are normal.  Skin/extremities: Warm and dry.  No distal lower extremity edema noted.  No discoloration noted to distal lower extremities.  She is tender over her right ankle region which is her area of recent fracture.  Recent studies: X-ray of right lower extremity, and CT scan of head and neck reports reviewed from earlier in the week.  Assessment/Plan   1. Right tibial fracture  oxyCODONE (ROXICODONE) 5 MG immediate release tablet    2. Right otitis media     3. Mastoiditis of right side     4. Multiple sclerosis (H)     5. Muscle spasticity     6. History of depression         Patient Instructions   Changes made on day of exam and management included starting use of oxycodone 5 mg tablet every 4 hours as needed for pain relief not controlled her prior medications.  Hopefully she will only need this new medication for the next 3-4 weeks for pain control.  Otherwise, continue current management.  No splinting or casting is recommended through her ER visit for her right tibial fracture, which I feel is appropriate.     Hood Persaud, DO

## 2021-06-20 NOTE — PROGRESS NOTES
[facility]    Facility:   [facility] [[id number]]   Code Status: DNR      CHIEF COMPLAINT/REASON FOR VISIT:  Chief Complaint   Patient presents with     Review Of Multiple Medical Conditions       HISTORY:      HPI: Aga is a 68 y.o. female who I had a chance to visit with secondary to review of chronic medical conditions.  She was hospitalized June 19 - July 11, 2018 secondary to acute hypoxic respiratory failure with a history of multiple sclerosis chronic diastolic congestive failure COPD chronic pain protein malnutrition.  She also does have a history of anxiety depression physical deconditioning.  Had a chance to talk to her about her chronic medical conditions.  No current respiratory issues.  She does use oxygen primarily at night to help her sleep.  She has been normotensive and afebrile.  Does have a suprapubic Bentley catheter in.  She is on ferrous sulfate secondary to anemia I will go ahead and recheck the hemoglobin perhaps discontinue the ferrous sulfate in the near future she is also due for a BMP.  For her mood she is on paroxetine 10 mg along with tramadol 25 mg 3 times a day for MS and chronic pain.  Does do duo nebs 3 times a day.  For her moods and depression she is on fluoxetine 10 mg and also is on gabapentin 100 mg 3 times a day along with 200 mg at bedtime and does take baclofen for the muscular spasticity 10 mg twice a day has not received any as needed medication.    Past Medical History:   Diagnosis Date     Cardiac murmur      Chronic pain      GI bleed 11-     Hypertrophic cardiomyopathy (H) x 15 yrs     Hypotension 11/18/2014     Multiple sclerosis (H)      Neurogenic bladder      Pressure ulcer     right coccyx     SAH (subarachnoid hemorrhage) (H) 7/27/2016     Tibial fracture left     Urinary calculi      UTI (lower urinary tract infection)      UTI (urinary tract infection) 7/28/2016             Family History   Problem Relation  Age of Onset     Cancer Mother      Lung     Diabetes Mother      Hypertension Mother      No Medical Problems Father      in NH     No Medical Problems Daughter      No Medical Problems Daughter      Cardiomyopathy Sister      Social History     Social History     Marital status:      Spouse name: N/A     Number of children: N/A     Years of education: N/A     Social History Main Topics     Smoking status: Former Smoker     Packs/day: 0.50     Years: 2.00     Quit date: 1/1/1974     Smokeless tobacco: Never Used     Alcohol use No     Drug use: No     Sexual activity: Yes     Birth control/ protection: Surgical     Other Topics Concern     Not on file     Social History Narrative         Review of Systems  Currently denies any chills and fever coughing wheezing chest pain dizziness vertigo nausea vomiting headache stiff neck or swollen glands.  Does have a history of multiple sclerosis hypertension COPD generalized weakness deconditioning spasticity chronic pain.    Current Outpatient Prescriptions:      acetaminophen (TYLENOL) 325 MG tablet, Take 2 tablets (650 mg total) by mouth every 6 (six) hours as needed., Disp: , Rfl: 0     albuterol (PROVENTIL) 2.5 mg /3 mL (0.083 %) nebulizer solution, Take 3 mL (2.5 mg total) by nebulization every 4 (four) hours as needed for shortness of breath., Disp: , Rfl: 0     atenolol (TENORMIN) 25 MG tablet, Take 1 tablet (25 mg total) by mouth 2 (two) times a day., Disp: , Rfl: 0     baclofen (LIORESAL) 10 MG tablet, Take 1 tablet (10 mg total) by mouth 2 (two) times a day., Disp: , Rfl: 0     baclofen (LIORESAL) 10 MG tablet, Take 1 tablet (10 mg total) by mouth 4 (four) times a day as needed (muscle spasm)., Disp: , Rfl: 0     baclofen (LIORESAL) 2,000 mcg/mL injection, Baclofen Intrathecal Pump Last known refill date : 5/7/18 Next refill date : 6/20/18 Clinic managing pump : [facility]la[facility], Disp: , Rfl: 0     bisacodyl (DULCOLAX) 10 mg suppository, 1 AR q day prn. (Patient  taking differently: Insert 10 mg into the rectum daily as needed. ), Disp: , Rfl: 0     cholecalciferol, vitamin D3, 1,000 unit tablet, Take 1 tablet (1,000 Units total) by mouth daily., Disp: , Rfl: 0     ferrous sulfate 325 (65 FE) MG tablet, Take 1 tablet (325 mg total) by mouth daily with breakfast., Disp: , Rfl: 0     fluticasone-salmeterol (ADVAIR HFA) 45-21 mcg/actuation inhaler, Inhale 2 puffs 2 (two) times a day., Disp: 1 Inhaler, Rfl: 12     furosemide (LASIX) 20 MG tablet, Take 0.5 tablets (10 mg total) by mouth every other day., Disp: , Rfl: 0     gabapentin (NEURONTIN) 100 MG capsule, Take 200 mg by mouth at bedtime., Disp: , Rfl: 0     gabapentin (NEURONTIN) 100 MG capsule, Take 100 mg by mouth 3 (three) times a day., Disp: , Rfl: 0     glatiramer (COPAXONE) 40 mg/mL Syrg injection, Inject 1 mL (40 mg total) under the skin 3 (three) times a week. (Patient taking differently: Inject 40 mg under the skin 3 (three) times a week. Mon/Wed/Fri), Disp: , Rfl: 0     ipratropium-albuterol (DUO-NEB) 0.5-2.5 mg/3 mL nebulizer, Take 3 mL by nebulization 3 (three) times a day., Disp: , Rfl: 0     loratadine (CLARITIN) 10 mg tablet, Take 1 tablet (10 mg total) by mouth daily., Disp: , Rfl: 0     miconazole (MICOTIN) 2 % powder, Apply to folds, inner thigh region, bilateral axilla's and right breast fold. (Patient taking differently: Apply 1 application topically 2 (two) times a day. Apply to folds, inner thigh region, bilateral axilla's and right breast fold.), Disp: , Rfl: 0     multivitamin with minerals (THERA-M) 9 mg iron-400 mcg Tab tablet, Take 1 tablet by mouth daily., Disp: , Rfl: 0     omega 3-dha-epa-fish oil (FISH OIL) 60- mg cap capsule, Take 2 capsules by mouth daily., Disp: , Rfl: 0     PARoxetine (PAXIL) 10 MG tablet, Take 1 tablet (10 mg total) by mouth every morning., Disp: , Rfl: 0     polyethylene glycol (MIRALAX) 17 gram packet, Take 1 packet (17 g total) by mouth daily., Disp: , Rfl: 0      senna-docusate (PERICOLACE) 8.6-50 mg tablet, Take 1 tablet by mouth 2 (two) times a day as needed for constipation., Disp: , Rfl: 0     traMADol (ULTRAM) 50 mg tablet, Take 0.5 tablets (25 mg total) by mouth 3 (three) times a day., Disp: 45 tablet, Rfl: 0    .There were no vitals filed for this visit.  Blood pressure 120/57 pulse 72 respirations 16 weight 134 pounds  Physical Exam   Constitutional: No distress.   HENT:   Head: Normocephalic.   Eyes: Pupils are equal, round, and reactive to light.   Neck: Neck supple. No thyromegaly present.   Cardiovascular: Normal rate, regular rhythm and normal heart sounds.    3/6 OMAR   Pulmonary/Chest: Breath sounds normal.   Abdominal: Bowel sounds are normal. There is no tenderness. There is no guarding.   Genitourinary:   Genitourinary Comments: Suprapubic Bentley catheter   Musculoskeletal:   History of multiple sclerosis.  Muscular spasticity deconditioning.  Chronic pain.   Lymphadenopathy:     She has no cervical adenopathy.   Neurological: She is alert.   Skin: Skin is warm and dry. No rash noted.   Psychiatric: Her behavior is normal.   History of depression         LABS:   Lab Results   Component Value Date    WBC 4.3 07/23/2018    HGB 10.5 (L) 07/23/2018    HCT 35.5 07/23/2018    MCV 95 07/23/2018     07/23/2018     Results for orders placed or performed during the hospital encounter of 07/18/18   Basic Metabolic Panel   Result Value Ref Range    Sodium 142 136 - 145 mmol/L    Potassium 4.1 3.5 - 5.0 mmol/L    Chloride 111 (H) 98 - 107 mmol/L    CO2 24 22 - 31 mmol/L    Anion Gap, Calculation 7 5 - 18 mmol/L    Glucose 95 70 - 125 mg/dL    Calcium 9.2 8.5 - 10.5 mg/dL    BUN 10 8 - 22 mg/dL    Creatinine 0.54 (L) 0.60 - 1.10 mg/dL    GFR MDRD Af Amer >60 >60 mL/min/1.73m2    GFR MDRD Non Af Amer >60 >60 mL/min/1.73m2       Lab Results   Component Value Date    ALT 26 07/18/2018    AST 25 07/18/2018    ALKPHOS 69 07/18/2018    BILITOT 0.4 07/18/2018          ASSESSMENT:      ICD-10-CM    1. Multiple sclerosis, relapsing-remitting (H) G35    2. Muscle spasticity M62.838    3. Essential hypertension I10    4. Iron deficiency anemia due to chronic blood loss D50.0        PLAN:    No further changes at this time she has been stable.  She is due for a BMP and hemoglobin which we will order for the 23rd perhaps if her hemoglobin continues to look good we will discontinue the ferrous sulfate.  Continue with current medications and treatments along with pain medication.  She did not have any other further questions.      Electronically signed by: Michael Duane Johnson, CNP

## 2021-06-21 NOTE — PROGRESS NOTES
[facility]    [facility] [[id number]]   Code Status: DNR    Chief concern: Patient is seen by me today for admission to the long-term care facility.    Reason for admission: Patient is seen by me in long-term care facility, for readmission after being managed at [facility] for urinary tract infection with sepsis syndrome, and kidney stones, being admitted there on 10/11/2018, and being discharged on 10/15/2018.  Her condition improved with management in the hospital, and was discharged to be continued on a course of oral levofloxacin.  Her evaluation in the hospital included a CT scan of chest to rule out possible pulmonary embolus, with this study revealing bilateral kidney stones.  She was evaluated by urology, with no procedures being done in the hospital.  Placement of a left nephrostomy tube could be done if not improving clinically.  Removal of the stones would involve a more invasive procedure which urology was not sure patient wanted during evaluation in the hospital.  There was minimal obstruction noted of kidneys during hospital evaluation.  I spoke to patient about what she knew about her kidney problems.  I asked her what the urologist discussed with her, and she could not recall the exact nature of the problem.  When I suggested to her that there were some kidney stones which may need to be managed, she seemed to recall this with my cueing.  When I asked her if she would be willing to stay in the hospital to have this condition managed, she initially told me she did not want to go to the hospital again, then told me she may stay 1 or 2 days.  My review of systems was limited due to some patient confusion I believe.  She did not know the year, or her location.  She was able to tell me her areas of discomfort, which included her right ankle region where she is recovering from a fracture of her right tibia.  She also has some discomfort  "in her neck base regions, being more significant on the left side.  This discomfort is chronic she states.  During my medication reconciliation, I noted that her oral baclofen was increased from twice daily, to 3 times daily on a scheduled basis.  She still has the 4 times daily oral baclofen she can be given on a as needed basis.  She also still has oxycodone she can use for pain relief.  I spoke to nursing staff about this, and she had not requested any on the day I visited with her.  Patient denied having any problems with breathing, eating, or drinking.  She denied having any problems with bowel movements.  Her urostomy catheter was changed while in the hospital.    Review of systems: See history of present illness, otherwise negative.     The medication list from the nursing home orders in the [facility] E[facility], and the medications listed in the Epic EMR were reconciled, and any new medications to include in Epic are listed as a \"historical medication\".    Current Outpatient Prescriptions   Medication Sig Dispense Refill     acetaminophen (TYLENOL) 325 MG tablet Take 2 tablets (650 mg total) by mouth every 6 (six) hours as needed.  0     albuterol (PROVENTIL) 2.5 mg /3 mL (0.083 %) nebulizer solution Take 3 mL (2.5 mg total) by nebulization every 4 (four) hours as needed for shortness of breath.  0     atenolol (TENORMIN) 25 MG tablet Take 1 tablet (25 mg total) by mouth 2 (two) times a day.  0     baclofen (LIORESAL) 10 MG tablet Take 1 tablet (10 mg total) by mouth 4 (four) times a day as needed (muscle spasm).  0     baclofen (LIORESAL) 10 MG tablet Take 1 tablet (10 mg total) by mouth 3 (three) times a day.  0     baclofen (LIORESAL) 2,000 mcg/mL injection Baclofen Intrathecal Pump  Last known refill date : 5/7/18  Next refill date : 6/20/18  Clinic managing pump : [facility]  0     bisacodyl (DULCOLAX) 10 mg suppository 1 W[place] q day prn. (Patient taking differently: Insert 10 mg into the rectum daily as needed. )  0 "     cholecalciferol, vitamin D3, 1,000 unit tablet Take 1 tablet (1,000 Units total) by mouth daily.  0     fluticasone-salmeterol (ADVAIR HFA) 45-21 mcg/actuation inhaler Inhale 2 puffs 2 (two) times a day. 1 Inhaler 12     furosemide (LASIX) 20 MG tablet Take 0.5 tablets (10 mg total) by mouth every other day.  0     gabapentin (NEURONTIN) 100 MG capsule Take 200 mg by mouth at bedtime.  0     gabapentin (NEURONTIN) 100 MG capsule Take 100 mg by mouth 3 (three) times a day.  0     glatiramer (COPAXONE) 40 mg/mL Syrg injection Inject 1 mL (40 mg total) under the skin 3 (three) times a week. (Patient taking differently: Inject 40 mg under the skin 3 (three) times a week. Mon/Wed/Fri)  0     ipratropium-albuterol (DUO-NEB) 0.5-2.5 mg/3 mL nebulizer Take 3 mL by nebulization 3 (three) times a day.  0     levoFLOXacin (LEVAQUIN) 750 MG tablet Take 1 tablet (750 mg total) by mouth daily for 10 days. 10 tablet 0     loratadine (CLARITIN) 10 mg tablet Take 1 tablet (10 mg total) by mouth daily.  0     miconazole (MICOTIN) 2 % powder Apply to folds, inner thigh region, bilateral axilla's and right breast fold. (Patient taking differently: Apply 1 application topically 2 (two) times a day. Apply to folds, inner thigh region, bilateral axilla's and right breast fold.)  0     multivitamin with minerals (THERA-M) 9 mg iron-400 mcg Tab tablet Take 1 tablet by mouth daily.  0     omega 3-dha-epa-fish oil (FISH OIL) 60- mg cap capsule Take 2 capsules by mouth daily.  0     oxyCODONE (ROXICODONE) 5 MG immediate release tablet Take 1 tablet (5 mg total) by mouth every 4 (four) hours as needed for pain (For relief of severe pain.). 45 tablet 0     PARoxetine (PAXIL) 10 MG tablet Take 1 tablet (10 mg total) by mouth every morning.  0     polyethylene glycol (MIRALAX) 17 gram packet Take 1 packet (17 g total) by mouth daily.  0     senna-docusate (PERICOLACE) 8.6-50 mg tablet Take 1 tablet by mouth 2 (two) times a day as needed  for constipation.  0     traMADol (ULTRAM) 50 mg tablet Take 0.5 tablets (25 mg total) by mouth 3 (three) times a day. Give medication at 8 AM, 2 PM, and 2 PM. 45 tablet 1     No current facility-administered medications for this visit.      Past Medical History:   Diagnosis Date     Cardiac murmur      Chronic pain      GI bleed 11-     Hypertrophic cardiomyopathy (H) x 15 yrs     Hypotension 11/18/2014     Multiple sclerosis (H)      Neurogenic bladder      Pressure ulcer     right coccyx     SAH (subarachnoid hemorrhage) (H) 7/27/2016     Tibial fracture left     Urinary calculi      UTI (lower urinary tract infection)      UTI (urinary tract infection) 7/28/2016      Past Surgical History:   Procedure Laterality Date     BACLOFEN PUMP IMPLANTATION      and revision in 2014     BLADDER MITROFANOFF CONTINENT VESICOSTOMY       CHOLECYSTECTOMY       ESOPHAGOGASTRODUODENOSCOPY N/A 11/18/2014    Procedure: ESOPHAGOGASTRODUODENOSCOPY with bipolar cautery, epinephrine 1/10 dilution injection and resolution clip x2;  Surgeon: Jhoan Arguello MD;  Location: [facility];  Service:      ESOPHAGOGASTRODUODENOSCOPY N/A 1/4/2015    Procedure: ESOPHAGOGASTRODUODENOSCOPY with bicap, cautery, injection of epi/saline;  Surgeon: Ricky King MD;  Location: [facility];  Service:      HYSTERECTOMY       LITHOTRIPSY       PICC  1/5/2018          PROGRAMABLE BACLOFEN PUMP REVISION  4/2014      Social History   Substance Use Topics     Smoking status: Former Smoker     Packs/day: 0.50     Years: 2.00     Quit date: 1/1/1974     Smokeless tobacco: Never Used     Alcohol use No        Family History   Problem Relation Age of Onset     Cancer Mother      Lung     Diabetes Mother      Hypertension Mother      No Medical Problems Father      in NH     No Medical Problems Daughter      No Medical Problems Daughter      Cardiomyopathy Sister        Vitals:    10/03/18 1119 10/16/18 0558   BP:  144/66   Pulse:  82   Resp:  18    Temp:  98.5  F (36.9  C)   SpO2:  98%   Weight: 129 lb 8 oz (58.7 kg)        EXAM:   General: Vital signs reviewed.  Patient is in no acute appearing distress.  Breathing appears nonlabored.  Patient is alert and oriented to her room surroundings, and with the caregiver staff.  She did not know the year, stating it as 2085.  She stated her location as [place].  She was very pleasant, smiling, and complementing me on how my glasses looked at me.  ENT: No intraoral plaques, ulcers, or vesicles.  No abnormal ear or nasal drainage.  Eyes: No scleral icterus, no eye or eyelid injection or mattering.  Corneas are clear.  Neck: Supple  Heart: Heart rate is regular, and normal rate.  She has a 2/6 systolic murmur, with patient telling me she had mitral valve prolapse.  Lungs: Patient has mild bilateral rhonchi with good airflow bilaterally.  No accessory breathing muscle use.  No retractions.  Abdomen: Patient has slight tenderness around her urostomy site, otherwise abdomen is nontender and soft, no palpable abnormal masses or organomegaly.  Extremities/skin: warm and dry.  No ankle edema bilaterally.  With light palpation, patient told me she is tender over her right distal lower leg.  No palpable abnormality noted here.  I examined the buttock region during a dressing change done by nursing staff.  Patient has a stage I pressure ulcer over her coccygeal and bilateral upper buttock region with the skin blanching with palpation in this area.  She has a couple of areas of stage II ulcer within the same region involving about 25% of the total pressure ulcer area.  The entire involved area is about 10 cm diameter.  Treatment was done at the end of my exam by nursing staff.  Neuro: No new focal abnormalities.  Studies: No new labs since discharge from hospital.  Her BNP was noted to be elevated, and for treatment of this she is being given a low dosage of furosemide every other day.    Approximately 60 minutes was spent on  patient management, with greater than 50% of this time being spent on medication reconciliation between electronic medical records, review of past medical history and current medical management, and discussion of management with patient and care staff.    Assessment/Plan   1. Sepsis secondary to UTI (H)     2. Sepsis (H)  atenolol (TENORMIN) 25 MG tablet    Patient seems to be doing much better on exam.   3. Multiple sclerosis (H)  atenolol (TENORMIN) 25 MG tablet   4. Bilateral kidney stones     5. Acute and chronic respiratory failure with hypoxia (H)     6. Fracture of right tibia     7. Anxiety and depression     8. Stage I pressure ulcer of buttock      Bilateral   9. Stage II pressure ulcer of buttock      Bilateral   10. Elevated brain natriuretic peptide (BNP) level         Patient Instructions   Oxygen was ordered to be given at 2 L via nasal cannula as needed to keep SPO2 at 90% or greater.  Order was written for an outpatient follow-up visit with [facility].  Order written for a follow-up x-ray of the right lower leg.  Her basic metabolic panel, and BNP study should be repeated at the next provider exam.  If her BNP is improved, we should consider discontinuing her furosemide.  We will have to carefully follow her cognitive status.  She may not be able to safely make her own decision about urology procedures.  If her overall condition deteriorates, she will need urgent consultation with urology, likely with hospitalization.     Hood Persaud, DO

## 2021-06-25 NOTE — PROGRESS NOTES
"Progress Notes by Nazia Erazo NP at 1/31/2017  1:00 PM     Author: Nazia Erazo NP Service: -- Author Type: Nurse Practitioner    Filed: 1/31/2017  1:12 PM Encounter Date: 1/31/2017 Status: Signed    : Nazia Erazo NP (Nurse Practitioner)       Follow up Vascular Visit       Date of Service:1/31/2017    Date Last Seen: 11/15/2016; 11/15/2016    Chief Complaint: left posterior thigh wound    History:   Past Medical History   Diagnosis Date   ? Cardiac murmur    ? Chronic pain    ? GI bleed 11-   ? Hypertrophic cardiomyopathy x 15 yrs   ? Hypotension 11/18/2014   ? Multiple sclerosis    ? Neurogenic bladder    ? Pressure ulcer      right coccyx   ? SAH (subarachnoid hemorrhage) 7/27/2016   ? Tibial fracture left   ? Urinary calculi    ? UTI (lower urinary tract infection)    ? UTI (urinary tract infection) 7/28/2016       Pt returns to the Vascular clinic with regards to their left posterior thigh wound. Arrives alone today. Current POC consists of santyl daily to the ulcer covering with tegaderm foam; we previously tried mepilex border this did not stay in place. She had her w/c seating evaluated at [facility]; they made some modifications. Overall she is feeling better; cough has gone away; no pain, she is no longer chocking on her saliva; she reports having a recent xray and this was negative for pna. She is taking protein supplements. She is still up in her w/c for 1 hours intervals; she does not like this would like 2.5 hour intervals.    Allergies: Blood-group specific substance; Aspartame; Buchu-cornsilk-ch grass-hydran; Cephalexin; Fructose; Fructose 5 %; Furosemide; Hydrochlorothiazide; Spironolactone; Sulfamethoxazole-trimethoprim; and Lanolin    Physical Exam:    Visit Vitals   ? Pulse 76   ? Resp 14   ? Ht 5' 5\" (1.651 m)   ? Wt 160 lb (72.6 kg)  Comment: per pt report   ? BMI 26.63 kg/m2       General:  Patient presents to clinic in no apparent distress.  Head: " normocephalic atraumatic  Psychiatric:  Alert and oriented x3.   Respiratory: unlabored breathing; no cough  Integumentary:  Skin is uniformly warm, dry and pink.    Wound #1 Location: left posterior thigh  Size: [id number]. Pinpoint opening; healed in a divot; due to the small nature of the opening cannot visualize the base.  this is much improved. Periwound: no denudement, erythema, induration, maceration or warmth.       Circumferential volume measures:    No flowsheet data found.    Ulceration(s)/Wound(s):     Urostomy  Umbilicus (Active)       Suprapubic Catheter (Active)       Wound 11/15/16 Left posterior thigh (Active)   Pre Size Length 0.2 1/31/2017 12:00 PM   Pre Size Width 0.2 1/31/2017 12:00 PM   Pre Size Depth 0.2 1/31/2017 12:00 PM   Pre Total Sq cm 0.04 1/31/2017 12:00 PM       Pressure Ulcer 01/04/15 Buttocks Right Stage II 2 small open areas (Active)       Wound Abrasion(s) Foot Right;Medial (Active)       Left posterior thigh 1/31/17              Lab Values    No results found for: SEDRATE  Lab Results   Component Value Date    CREATININE 0.52 (L) 10/24/2016     No results found for: HGBA1C  Lab Results   Component Value Date    BUN 9 10/24/2016     Lab Results   Component Value Date    ALBUMIN 2.9 (L) 07/28/2016     No results found for: [id number]          Impression:   Unstageable pressure ulcer to left posterior thigh  Multiple Sclerosis  Wheelchair bound  Hypoalbuminemia    Assessment/Plan:          1. Excisional debridement of the ulcer(s) was recommended today, after consent was obtained and 2% Xylocaine was applied using a sterile curet the epidermal, dermal, down into the subcutaneous tissue and down into muscle tissue or ligamentous structures was sharply debrided for a total square cm of 0.04 The devitalized and necrotic tissue was removed.           2. Edema: na           3.  Wound treatment:stop the santyl; the opening is too small to get the medication in the opening; no  packing the wound; cover with tegaderm foam; change daily and prn           4. Nutrition: continue 1-2 boost supplements per day           5. Offloading: continue to reduce her time being up in the chair; 2 hour intervals for meals only     Patient will follow up with me PRN for reevaluation if the wound does not fully heal or worsens. It is very difficult for the patient to get to appts. They were instructed to call the clinic sooner with any signs or symptoms of infection or any further questions/concerns. Answered all questions.    Nazia Erazo DNP, RN, CNP, CWOCN  [facility]  [phone]

## 2022-07-17 NOTE — OR NURSING
Called nursing home and spoke with RYAN Limon re: cancellation of procedure, gave report and set up medical transportation. KATHRYN.   17-Jul-2022 20:55

## (undated) DEVICE — SOL WATER IRRIG 1000ML BOTTLE 2F7114

## (undated) DEVICE — LINEN TOWEL PACK X5 5464

## (undated) DEVICE — PAD CHUX UNDERPAD 30X30"

## (undated) DEVICE — GLOVE PROTEXIS W/NEU-THERA 7.5  2D73TE75

## (undated) DEVICE — DRSG GAUZE 4X4" TRAY 6939

## (undated) DEVICE — CONNECTOR WATER VALVE PERFUSION PACK STR 020272801

## (undated) DEVICE — ENDO SEAL BX PORT BPS-A

## (undated) DEVICE — LINEN GOWN XLG 5407

## (undated) DEVICE — SOL NACL 0.9% IRRIG 3000ML BAG 2B7477

## (undated) DEVICE — SUCTION MANIFOLD NEPTUNE 2 SYS 4 PORT 0702-020-000

## (undated) DEVICE — DRAPE GYN/UROLOGY FLUID POUCH TUR 29455

## (undated) DEVICE — GUIDEWIRE SENSOR DUAL FLEX STR 0.035"X150CM M0066703080

## (undated) DEVICE — SOL NACL 0.9% IRRIG 1000ML BOTTLE 2F7124

## (undated) DEVICE — PACK CYSTO CUSTOM ASC

## (undated) DEVICE — PREP POVIDONE IODINE SOLUTION 10% 120ML

## (undated) RX ORDER — BACLOFEN 2000 UG/ML
INJECTION, SOLUTION INTRATHECAL
Status: DISPENSED
Start: 2017-06-21

## (undated) RX ORDER — BACLOFEN 2000 UG/ML
INJECTION, SOLUTION INTRATHECAL
Status: DISPENSED
Start: 2018-01-01

## (undated) RX ORDER — PROPOFOL 10 MG/ML
INJECTION, EMULSION INTRAVENOUS
Status: DISPENSED
Start: 2018-01-01

## (undated) RX ORDER — BACLOFEN 2000 UG/ML
INJECTION, SOLUTION INTRATHECAL
Status: DISPENSED
Start: 2017-02-15

## (undated) RX ORDER — GENTAMICIN 40 MG/ML
INJECTION, SOLUTION INTRAMUSCULAR; INTRAVENOUS
Status: DISPENSED
Start: 2018-01-01

## (undated) RX ORDER — BACLOFEN 2000 UG/ML
INJECTION, SOLUTION INTRATHECAL
Status: DISPENSED
Start: 2017-03-29

## (undated) RX ORDER — BACLOFEN 2000 UG/ML
INJECTION, SOLUTION INTRATHECAL
Status: DISPENSED
Start: 2017-01-02

## (undated) RX ORDER — BACLOFEN 2000 UG/ML
INJECTION, SOLUTION INTRATHECAL
Status: DISPENSED
Start: 2017-05-10

## (undated) RX ORDER — BACLOFEN 2000 UG/ML
INJECTION, SOLUTION INTRATHECAL
Status: DISPENSED
Start: 2017-01-01

## (undated) RX ORDER — ONDANSETRON 2 MG/ML
INJECTION INTRAMUSCULAR; INTRAVENOUS
Status: DISPENSED
Start: 2018-01-01

## (undated) RX ORDER — GLYCOPYRROLATE 0.2 MG/ML
INJECTION INTRAMUSCULAR; INTRAVENOUS
Status: DISPENSED
Start: 2018-01-01

## (undated) RX ORDER — BACLOFEN 2000 UG/ML
INJECTION, SOLUTION INTRATHECAL
Status: DISPENSED
Start: 2017-09-22

## (undated) RX ORDER — DEXAMETHASONE SODIUM PHOSPHATE 4 MG/ML
INJECTION, SOLUTION INTRA-ARTICULAR; INTRALESIONAL; INTRAMUSCULAR; INTRAVENOUS; SOFT TISSUE
Status: DISPENSED
Start: 2018-01-01

## (undated) RX ORDER — KETOROLAC TROMETHAMINE 30 MG/ML
INJECTION, SOLUTION INTRAMUSCULAR; INTRAVENOUS
Status: DISPENSED
Start: 2018-01-01

## (undated) RX ORDER — BACLOFEN 2000 UG/ML
INJECTION, SOLUTION INTRATHECAL
Status: DISPENSED
Start: 2017-11-08

## (undated) RX ORDER — BACLOFEN 2000 UG/ML
INJECTION, SOLUTION INTRATHECAL
Status: DISPENSED
Start: 2017-08-08

## (undated) RX ORDER — LIDOCAINE HYDROCHLORIDE 20 MG/ML
INJECTION, SOLUTION EPIDURAL; INFILTRATION; INTRACAUDAL; PERINEURAL
Status: DISPENSED
Start: 2018-01-01

## (undated) RX ORDER — FENTANYL CITRATE 50 UG/ML
INJECTION, SOLUTION INTRAMUSCULAR; INTRAVENOUS
Status: DISPENSED
Start: 2018-01-01